# Patient Record
Sex: FEMALE | Race: OTHER | HISPANIC OR LATINO
[De-identification: names, ages, dates, MRNs, and addresses within clinical notes are randomized per-mention and may not be internally consistent; named-entity substitution may affect disease eponyms.]

---

## 2017-01-13 ENCOUNTER — RX RENEWAL (OUTPATIENT)
Age: 77
End: 2017-01-13

## 2017-02-02 ENCOUNTER — APPOINTMENT (OUTPATIENT)
Dept: INTERNAL MEDICINE | Facility: CLINIC | Age: 77
End: 2017-02-02

## 2017-02-02 VITALS
HEART RATE: 77 BPM | SYSTOLIC BLOOD PRESSURE: 132 MMHG | HEIGHT: 59 IN | BODY MASS INDEX: 28.83 KG/M2 | WEIGHT: 143 LBS | DIASTOLIC BLOOD PRESSURE: 72 MMHG

## 2017-02-07 ENCOUNTER — FORM ENCOUNTER (OUTPATIENT)
Age: 77
End: 2017-02-07

## 2017-02-08 ENCOUNTER — RESULT REVIEW (OUTPATIENT)
Age: 77
End: 2017-02-08

## 2017-02-08 ENCOUNTER — RX RENEWAL (OUTPATIENT)
Age: 77
End: 2017-02-08

## 2017-02-10 LAB
ALBUMIN SERPL-MCNC: 3.8 G/DL
ALBUMIN/GLOB SERPL: 1.19
ALP SERPL-CCNC: 56 IU/L
ALT SERPL-CCNC: 14 IU/L
ANION GAP SERPL CALC-SCNC: 10 MEQ/L
APPEARANCE UR: CLEAR
AST SERPL-CCNC: 15 IU/L
BACTERIA URNS QL MICRO: ABNORMAL
BASOPHILS # BLD: 0.07 TH/MM3
BASOPHILS NFR BLD: 0.7 %
BILIRUB SERPL-MCNC: 0.4 MG/DL
BILIRUB UR QL STRIP: NEGATIVE
BUN SERPL-MCNC: 14 MG/DL
BUN/CREAT SERPL: 19.7 %
CALCIUM SERPL-MCNC: 9.7 MG/DL
CHLORIDE SERPL-SCNC: 107 MEQ/L
CHOLEST SERPL-MCNC: 221 MG/DL
CO2 SERPL-SCNC: 25 MEQ/L
COLOR UR: YELLOW
CREAT SERPL-MCNC: 0.71 MG/DL
CREAT UR-MCNC: 137 MG/DL
EOSINOPHIL # BLD: 0.41 TH/MM3
EOSINOPHIL NFR BLD: 4.2 %
ERYTHROCYTE [DISTWIDTH] IN BLOOD BY AUTOMATED COUNT: 14.8 %
ESTIMATED AVERGAGE GLUCOSE (NORTH): 229 MG/DL
GFR SERPL CREATININE-BSD FRML MDRD: 80
GLUCOSE SERPL-MCNC: 166 MG/DL
GLUCOSE UR STRIP-MCNC: NEGATIVE MG/DL
GRANULOCYTES # BLD: 6.19 TH/MM3
GRANULOCYTES NFR BLD: 63.3 %
HBA1C MFR BLD: 9.6 %
HCT VFR BLD AUTO: 39 %
HDLC SERPL-MCNC: 69 MG/DL
HDLC SERPL: 3.2
HGB BLD-MCNC: 12.1 G/DL
HGB UR QL STRIP: NEGATIVE
IMM GRANULOCYTES # BLD: 0.03 TH/MM3
IMM GRANULOCYTES NFR BLD: 0.3 %
KETONES UR STRIP-MCNC: NEGATIVE MG/DL
LDLC SERPL DIRECT ASSAY-MCNC: 109 MG/DL
LYMPHOCYTES # BLD: 2.5 TH/MM3
LYMPHOCYTES NFR BLD: 25.5 %
MCH RBC QN AUTO: 27.8 PG
MCHC RBC AUTO-ENTMCNC: 31 G/DL
MCV RBC AUTO: 89.4 FL
MICROALBUMIN UR-MCNC: 1.1 MG/DL
MICROALBUMIN/CREAT UR-RTO: 8 UG/MG
MONOCYTES # BLD: 0.59 TH/MM3
MONOCYTES NFR BLD: 6 %
NITRITE UR QL STRIP: NEGATIVE
PH UR STRIP: 5.5
PLATELET # BLD: 469 TH/MM3
PMV BLD AUTO: 10.4 FL
POTASSIUM SERPL-SCNC: 5.2 MMOL/L
PROT SERPL-MCNC: 7 G/DL
PROT UR STRIP-MCNC: ABNORMAL MG/DL
RBC # BLD AUTO: 4.36 MIL/MM3
SODIUM SERPL-SCNC: 142 MEQ/L
SP GR UR STRIP: 1.02
TRIGL SERPL-MCNC: 296 MG/DL
URINE COMP/EPITH (NORTH): ABNORMAL
UROBILINOGEN UR STRIP-MCNC: 0.2 MG/DL
VLDLC SERPL-MCNC: 59 MG/DL
WBC # BLD: 9.79 TH/MM3
WBC URNS QL MICRO: ABNORMAL P/HPF
WBC URNS QL MICRO: NEGATIVE

## 2017-05-04 ENCOUNTER — APPOINTMENT (OUTPATIENT)
Dept: INTERNAL MEDICINE | Facility: CLINIC | Age: 77
End: 2017-05-04

## 2017-05-04 VITALS
HEART RATE: 81 BPM | SYSTOLIC BLOOD PRESSURE: 127 MMHG | DIASTOLIC BLOOD PRESSURE: 78 MMHG | BODY MASS INDEX: 28.67 KG/M2 | HEIGHT: 59 IN | WEIGHT: 142.19 LBS

## 2017-05-08 ENCOUNTER — FORM ENCOUNTER (OUTPATIENT)
Age: 77
End: 2017-05-08

## 2017-05-10 ENCOUNTER — APPOINTMENT (OUTPATIENT)
Dept: ORTHOPEDIC SURGERY | Facility: CLINIC | Age: 77
End: 2017-05-10

## 2017-05-19 ENCOUNTER — EMERGENCY (EMERGENCY)
Facility: HOSPITAL | Age: 77
LOS: 0 days | Discharge: HOME | End: 2017-05-19
Admitting: INTERNAL MEDICINE

## 2017-06-15 ENCOUNTER — APPOINTMENT (OUTPATIENT)
Dept: PODIATRY | Facility: CLINIC | Age: 77
End: 2017-06-15

## 2017-06-15 ENCOUNTER — OUTPATIENT (OUTPATIENT)
Dept: OUTPATIENT SERVICES | Facility: HOSPITAL | Age: 77
LOS: 1 days | Discharge: HOME | End: 2017-06-15

## 2017-06-15 DIAGNOSIS — L85.3 XEROSIS CUTIS: ICD-10-CM

## 2017-06-15 DIAGNOSIS — M79.671 PAIN IN RIGHT FOOT: ICD-10-CM

## 2017-06-15 DIAGNOSIS — E11.9 TYPE 2 DIABETES MELLITUS WITHOUT COMPLICATIONS: ICD-10-CM

## 2017-06-15 DIAGNOSIS — I10 ESSENTIAL (PRIMARY) HYPERTENSION: ICD-10-CM

## 2017-06-15 DIAGNOSIS — E78.5 HYPERLIPIDEMIA, UNSPECIFIED: ICD-10-CM

## 2017-06-15 DIAGNOSIS — M79.672 PAIN IN LEFT FOOT: ICD-10-CM

## 2017-06-15 DIAGNOSIS — B35.1 TINEA UNGUIUM: ICD-10-CM

## 2017-06-21 ENCOUNTER — APPOINTMENT (OUTPATIENT)
Dept: ENDOCRINOLOGY | Facility: CLINIC | Age: 77
End: 2017-06-21

## 2017-06-21 VITALS
DIASTOLIC BLOOD PRESSURE: 82 MMHG | HEIGHT: 59 IN | BODY MASS INDEX: 29.23 KG/M2 | SYSTOLIC BLOOD PRESSURE: 128 MMHG | WEIGHT: 145 LBS | HEART RATE: 69 BPM

## 2017-06-22 ENCOUNTER — APPOINTMENT (OUTPATIENT)
Dept: INTERNAL MEDICINE | Facility: CLINIC | Age: 77
End: 2017-06-22

## 2017-06-22 ENCOUNTER — OUTPATIENT (OUTPATIENT)
Dept: OUTPATIENT SERVICES | Facility: HOSPITAL | Age: 77
LOS: 1 days | Discharge: HOME | End: 2017-06-22

## 2017-06-22 VITALS
HEIGHT: 59 IN | DIASTOLIC BLOOD PRESSURE: 80 MMHG | SYSTOLIC BLOOD PRESSURE: 150 MMHG | WEIGHT: 146 LBS | HEART RATE: 86 BPM | BODY MASS INDEX: 29.43 KG/M2

## 2017-06-22 DIAGNOSIS — E78.5 HYPERLIPIDEMIA, UNSPECIFIED: ICD-10-CM

## 2017-06-22 DIAGNOSIS — Z85.038 PERSONAL HISTORY OF OTHER MALIGNANT NEOPLASM OF LARGE INTESTINE: ICD-10-CM

## 2017-06-22 DIAGNOSIS — I10 ESSENTIAL (PRIMARY) HYPERTENSION: ICD-10-CM

## 2017-06-22 DIAGNOSIS — E11.9 TYPE 2 DIABETES MELLITUS WITHOUT COMPLICATIONS: ICD-10-CM

## 2017-06-28 DIAGNOSIS — R10.32 LEFT LOWER QUADRANT PAIN: ICD-10-CM
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
## 2017-06-28 DIAGNOSIS — E11.9 TYPE 2 DIABETES MELLITUS WITHOUT COMPLICATIONS: ICD-10-CM

## 2017-06-28 DIAGNOSIS — R10.9 UNSPECIFIED ABDOMINAL PAIN: ICD-10-CM

## 2017-06-28 DIAGNOSIS — I10 ESSENTIAL (PRIMARY) HYPERTENSION: ICD-10-CM

## 2017-06-28 DIAGNOSIS — S61.201A UNSPECIFIED OPEN WOUND OF LEFT INDEX FINGER WITHOUT DAMAGE TO NAIL, INITIAL ENCOUNTER: ICD-10-CM
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
## 2017-06-28 DIAGNOSIS — Z79.84 LONG TERM (CURRENT) USE OF ORAL HYPOGLYCEMIC DRUGS: ICD-10-CM

## 2017-06-28 DIAGNOSIS — Z90.49 ACQUIRED ABSENCE OF OTHER SPECIFIED PARTS OF DIGESTIVE TRACT: ICD-10-CM

## 2017-06-28 DIAGNOSIS — Z79.899 OTHER LONG TERM (CURRENT) DRUG THERAPY: ICD-10-CM
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
## 2017-08-01 ENCOUNTER — APPOINTMENT (OUTPATIENT)
Dept: PULMONOLOGY | Facility: CLINIC | Age: 77
End: 2017-08-01

## 2017-08-01 ENCOUNTER — OUTPATIENT (OUTPATIENT)
Dept: OUTPATIENT SERVICES | Facility: HOSPITAL | Age: 77
LOS: 1 days | Discharge: HOME | End: 2017-08-01

## 2017-08-01 VITALS
HEIGHT: 59 IN | WEIGHT: 146 LBS | OXYGEN SATURATION: 99 % | HEART RATE: 71 BPM | BODY MASS INDEX: 29.43 KG/M2 | DIASTOLIC BLOOD PRESSURE: 66 MMHG | SYSTOLIC BLOOD PRESSURE: 104 MMHG

## 2017-08-01 DIAGNOSIS — E11.9 TYPE 2 DIABETES MELLITUS WITHOUT COMPLICATIONS: ICD-10-CM

## 2017-08-01 DIAGNOSIS — I10 ESSENTIAL (PRIMARY) HYPERTENSION: ICD-10-CM

## 2017-08-01 DIAGNOSIS — E78.5 HYPERLIPIDEMIA, UNSPECIFIED: ICD-10-CM

## 2017-08-02 ENCOUNTER — CLINICAL ADVICE (OUTPATIENT)
Age: 77
End: 2017-08-02

## 2017-08-02 DIAGNOSIS — Z82.49 FAMILY HISTORY OF ISCHEMIC HEART DISEASE AND OTHER DISEASES OF THE CIRCULATORY SYSTEM: ICD-10-CM

## 2017-08-02 DIAGNOSIS — Z83.3 FAMILY HISTORY OF DIABETES MELLITUS: ICD-10-CM

## 2017-08-02 DIAGNOSIS — Z63.4 DISAPPEARANCE AND DEATH OF FAMILY MEMBER: ICD-10-CM

## 2017-08-02 DIAGNOSIS — Z81.8 FAMILY HISTORY OF OTHER MENTAL AND BEHAVIORAL DISORDERS: ICD-10-CM

## 2017-08-02 DIAGNOSIS — Z80.1 FAMILY HISTORY OF MALIGNANT NEOPLASM OF TRACHEA, BRONCHUS AND LUNG: ICD-10-CM

## 2017-08-02 DIAGNOSIS — Z56.0 UNEMPLOYMENT, UNSPECIFIED: ICD-10-CM

## 2017-08-02 SDOH — SOCIAL STABILITY - SOCIAL INSECURITY: DISSAPEARANCE AND DEATH OF FAMILY MEMBER: Z63.4

## 2017-08-02 SDOH — ECONOMIC STABILITY - INCOME SECURITY: UNEMPLOYMENT, UNSPECIFIED: Z56.0

## 2017-08-03 ENCOUNTER — CLINICAL ADVICE (OUTPATIENT)
Age: 77
End: 2017-08-03

## 2017-08-03 PROBLEM — Z82.49 FAMILY HISTORY OF HYPERTENSION: Status: ACTIVE | Noted: 2017-08-03

## 2017-08-03 PROBLEM — Z81.8 FAMILY HISTORY OF DEPRESSION: Status: ACTIVE | Noted: 2017-08-03

## 2017-08-03 PROBLEM — Z80.1 FAMILY HISTORY OF LUNG CANCER: Status: ACTIVE | Noted: 2017-08-03

## 2017-08-04 PROBLEM — Z56.0 UNEMPLOYED: Status: ACTIVE | Noted: 2017-08-04

## 2017-08-10 ENCOUNTER — APPOINTMENT (OUTPATIENT)
Dept: INTERNAL MEDICINE | Facility: CLINIC | Age: 77
End: 2017-08-10

## 2017-08-11 ENCOUNTER — OUTPATIENT (OUTPATIENT)
Dept: OUTPATIENT SERVICES | Facility: HOSPITAL | Age: 77
LOS: 1 days | Discharge: HOME | End: 2017-08-11

## 2017-08-11 ENCOUNTER — APPOINTMENT (OUTPATIENT)
Dept: GASTROENTEROLOGY | Facility: CLINIC | Age: 77
End: 2017-08-11

## 2017-08-11 DIAGNOSIS — I10 ESSENTIAL (PRIMARY) HYPERTENSION: ICD-10-CM

## 2017-08-11 DIAGNOSIS — E78.5 HYPERLIPIDEMIA, UNSPECIFIED: ICD-10-CM

## 2017-08-11 DIAGNOSIS — R19.7 DIARRHEA, UNSPECIFIED: ICD-10-CM

## 2017-08-11 DIAGNOSIS — E11.9 TYPE 2 DIABETES MELLITUS WITHOUT COMPLICATIONS: ICD-10-CM

## 2017-08-11 RX ORDER — SITAGLIPTIN AND METFORMIN HYDROCHLORIDE 50; 1000 MG/1; MG/1
50-1000 TABLET, FILM COATED ORAL
Qty: 180 | Refills: 2 | Status: COMPLETED | COMMUNITY
Start: 2017-06-22 | End: 2017-08-02

## 2017-09-12 ENCOUNTER — OUTPATIENT (OUTPATIENT)
Dept: OUTPATIENT SERVICES | Facility: HOSPITAL | Age: 77
LOS: 1 days | Discharge: HOME | End: 2017-09-12

## 2017-09-12 ENCOUNTER — OTHER (OUTPATIENT)
Age: 77
End: 2017-09-12

## 2017-09-12 DIAGNOSIS — E11.9 TYPE 2 DIABETES MELLITUS WITHOUT COMPLICATIONS: ICD-10-CM

## 2017-09-12 DIAGNOSIS — E78.5 HYPERLIPIDEMIA, UNSPECIFIED: ICD-10-CM

## 2017-09-12 DIAGNOSIS — I10 ESSENTIAL (PRIMARY) HYPERTENSION: ICD-10-CM

## 2017-09-15 DIAGNOSIS — H40.029 OPEN ANGLE WITH BORDERLINE FINDINGS, HIGH RISK, UNSPECIFIED EYE: ICD-10-CM

## 2017-09-15 DIAGNOSIS — H35.09 OTHER INTRARETINAL MICROVASCULAR ABNORMALITIES: ICD-10-CM

## 2017-09-15 DIAGNOSIS — E11.9 TYPE 2 DIABETES MELLITUS WITHOUT COMPLICATIONS: ICD-10-CM

## 2017-09-15 DIAGNOSIS — H25.041 POSTERIOR SUBCAPSULAR POLAR AGE-RELATED CATARACT, RIGHT EYE: ICD-10-CM

## 2017-09-15 DIAGNOSIS — H25.812 COMBINED FORMS OF AGE-RELATED CATARACT, LEFT EYE: ICD-10-CM

## 2017-09-21 ENCOUNTER — APPOINTMENT (OUTPATIENT)
Dept: INTERNAL MEDICINE | Facility: CLINIC | Age: 77
End: 2017-09-21

## 2017-09-22 ENCOUNTER — OTHER (OUTPATIENT)
Age: 77
End: 2017-09-22

## 2017-10-12 ENCOUNTER — MESSAGE (OUTPATIENT)
Age: 77
End: 2017-10-12

## 2017-10-18 ENCOUNTER — OUTPATIENT (OUTPATIENT)
Dept: OUTPATIENT SERVICES | Facility: HOSPITAL | Age: 77
LOS: 1 days | Discharge: HOME | End: 2017-10-18

## 2017-10-18 DIAGNOSIS — E11.9 TYPE 2 DIABETES MELLITUS WITHOUT COMPLICATIONS: ICD-10-CM

## 2017-10-18 DIAGNOSIS — E78.5 HYPERLIPIDEMIA, UNSPECIFIED: ICD-10-CM

## 2017-10-18 DIAGNOSIS — I10 ESSENTIAL (PRIMARY) HYPERTENSION: ICD-10-CM

## 2017-10-20 DIAGNOSIS — E11.9 TYPE 2 DIABETES MELLITUS WITHOUT COMPLICATIONS: ICD-10-CM

## 2017-10-20 DIAGNOSIS — I10 ESSENTIAL (PRIMARY) HYPERTENSION: ICD-10-CM

## 2017-10-20 DIAGNOSIS — Z86.010 PERSONAL HISTORY OF COLONIC POLYPS: ICD-10-CM

## 2017-10-20 DIAGNOSIS — Z85.038 PERSONAL HISTORY OF OTHER MALIGNANT NEOPLASM OF LARGE INTESTINE: ICD-10-CM

## 2017-10-20 DIAGNOSIS — Z12.11 ENCOUNTER FOR SCREENING FOR MALIGNANT NEOPLASM OF COLON: ICD-10-CM

## 2017-10-20 DIAGNOSIS — K64.8 OTHER HEMORRHOIDS: ICD-10-CM

## 2017-11-20 ENCOUNTER — OTHER (OUTPATIENT)
Age: 77
End: 2017-11-20

## 2017-12-05 ENCOUNTER — EMERGENCY (EMERGENCY)
Facility: HOSPITAL | Age: 77
LOS: 0 days | Discharge: HOME | End: 2017-12-05
Admitting: INTERNAL MEDICINE

## 2017-12-05 DIAGNOSIS — J45.909 UNSPECIFIED ASTHMA, UNCOMPLICATED: ICD-10-CM

## 2017-12-05 DIAGNOSIS — I10 ESSENTIAL (PRIMARY) HYPERTENSION: ICD-10-CM

## 2017-12-05 DIAGNOSIS — E11.9 TYPE 2 DIABETES MELLITUS WITHOUT COMPLICATIONS: ICD-10-CM

## 2017-12-05 DIAGNOSIS — Z79.84 LONG TERM (CURRENT) USE OF ORAL HYPOGLYCEMIC DRUGS: ICD-10-CM

## 2017-12-05 DIAGNOSIS — E78.5 HYPERLIPIDEMIA, UNSPECIFIED: ICD-10-CM

## 2017-12-05 DIAGNOSIS — Z90.49 ACQUIRED ABSENCE OF OTHER SPECIFIED PARTS OF DIGESTIVE TRACT: ICD-10-CM

## 2017-12-05 DIAGNOSIS — R05 COUGH: ICD-10-CM

## 2017-12-05 DIAGNOSIS — Z79.899 OTHER LONG TERM (CURRENT) DRUG THERAPY: ICD-10-CM

## 2018-02-15 ENCOUNTER — APPOINTMENT (OUTPATIENT)
Dept: INTERNAL MEDICINE | Facility: CLINIC | Age: 78
End: 2018-02-15

## 2018-02-15 ENCOUNTER — OUTPATIENT (OUTPATIENT)
Dept: OUTPATIENT SERVICES | Facility: HOSPITAL | Age: 78
LOS: 1 days | Discharge: HOME | End: 2018-02-15

## 2018-02-15 VITALS
SYSTOLIC BLOOD PRESSURE: 147 MMHG | DIASTOLIC BLOOD PRESSURE: 66 MMHG | WEIGHT: 146 LBS | BODY MASS INDEX: 29.43 KG/M2 | HEART RATE: 82 BPM | HEIGHT: 59 IN

## 2018-02-15 DIAGNOSIS — R10.9 UNSPECIFIED ABDOMINAL PAIN: ICD-10-CM

## 2018-02-15 DIAGNOSIS — J34.1 CYST AND MUCOCELE OF NOSE AND NASAL SINUS: ICD-10-CM

## 2018-02-15 DIAGNOSIS — E78.5 HYPERLIPIDEMIA, UNSPECIFIED: ICD-10-CM

## 2018-02-15 DIAGNOSIS — E11.9 TYPE 2 DIABETES MELLITUS WITHOUT COMPLICATIONS: ICD-10-CM

## 2018-02-15 DIAGNOSIS — I10 ESSENTIAL (PRIMARY) HYPERTENSION: ICD-10-CM

## 2018-02-15 RX ORDER — PIOGLITAZONE HYDROCHLORIDE 30 MG/1
30 TABLET ORAL DAILY
Qty: 90 | Refills: 3 | Status: DISCONTINUED | COMMUNITY
Start: 2017-06-22 | End: 2018-02-15

## 2018-02-20 ENCOUNTER — OUTPATIENT (OUTPATIENT)
Dept: OUTPATIENT SERVICES | Facility: HOSPITAL | Age: 78
LOS: 1 days | Discharge: HOME | End: 2018-02-20

## 2018-02-20 ENCOUNTER — APPOINTMENT (OUTPATIENT)
Dept: PULMONOLOGY | Facility: CLINIC | Age: 78
End: 2018-02-20

## 2018-02-20 VITALS
HEIGHT: 59 IN | OXYGEN SATURATION: 97 % | BODY MASS INDEX: 29.43 KG/M2 | SYSTOLIC BLOOD PRESSURE: 120 MMHG | HEART RATE: 75 BPM | WEIGHT: 146 LBS | DIASTOLIC BLOOD PRESSURE: 70 MMHG

## 2018-03-08 ENCOUNTER — APPOINTMENT (OUTPATIENT)
Dept: PODIATRY | Facility: CLINIC | Age: 78
End: 2018-03-08

## 2018-03-14 ENCOUNTER — OUTPATIENT (OUTPATIENT)
Dept: OUTPATIENT SERVICES | Facility: HOSPITAL | Age: 78
LOS: 1 days | Discharge: HOME | End: 2018-03-14

## 2018-03-20 ENCOUNTER — APPOINTMENT (OUTPATIENT)
Dept: PULMONOLOGY | Facility: CLINIC | Age: 78
End: 2018-03-20

## 2018-03-20 ENCOUNTER — OUTPATIENT (OUTPATIENT)
Dept: OUTPATIENT SERVICES | Facility: HOSPITAL | Age: 78
LOS: 1 days | Discharge: HOME | End: 2018-03-20

## 2018-03-20 VITALS
HEIGHT: 59 IN | DIASTOLIC BLOOD PRESSURE: 70 MMHG | HEART RATE: 74 BPM | SYSTOLIC BLOOD PRESSURE: 120 MMHG | OXYGEN SATURATION: 97 % | WEIGHT: 142.56 LBS | BODY MASS INDEX: 28.74 KG/M2

## 2018-03-20 DIAGNOSIS — R05 COUGH: ICD-10-CM

## 2018-03-27 ENCOUNTER — APPOINTMENT (OUTPATIENT)
Dept: PULMONOLOGY | Facility: CLINIC | Age: 78
End: 2018-03-27

## 2018-03-30 ENCOUNTER — RX RENEWAL (OUTPATIENT)
Age: 78
End: 2018-03-30

## 2018-04-11 ENCOUNTER — OUTPATIENT (OUTPATIENT)
Dept: OUTPATIENT SERVICES | Facility: HOSPITAL | Age: 78
LOS: 1 days | Discharge: HOME | End: 2018-04-11

## 2018-04-11 ENCOUNTER — APPOINTMENT (OUTPATIENT)
Dept: ENDOCRINOLOGY | Facility: CLINIC | Age: 78
End: 2018-04-11

## 2018-04-11 VITALS
WEIGHT: 142 LBS | HEIGHT: 59 IN | SYSTOLIC BLOOD PRESSURE: 122 MMHG | HEART RATE: 75 BPM | DIASTOLIC BLOOD PRESSURE: 76 MMHG | BODY MASS INDEX: 28.63 KG/M2 | RESPIRATION RATE: 18 BRPM

## 2018-04-11 LAB — GLUCOSE BLDC GLUCOMTR-MCNC: 203

## 2018-05-01 ENCOUNTER — LABORATORY RESULT (OUTPATIENT)
Age: 78
End: 2018-05-01

## 2018-05-01 ENCOUNTER — APPOINTMENT (OUTPATIENT)
Dept: INTERNAL MEDICINE | Facility: CLINIC | Age: 78
End: 2018-05-01

## 2018-05-01 ENCOUNTER — OUTPATIENT (OUTPATIENT)
Dept: OUTPATIENT SERVICES | Facility: HOSPITAL | Age: 78
LOS: 1 days | Discharge: HOME | End: 2018-05-01

## 2018-05-01 VITALS
SYSTOLIC BLOOD PRESSURE: 146 MMHG | HEART RATE: 76 BPM | BODY MASS INDEX: 28.22 KG/M2 | DIASTOLIC BLOOD PRESSURE: 84 MMHG | HEIGHT: 59 IN | WEIGHT: 140 LBS

## 2018-05-01 RX ORDER — GUAIFENESIN 600 MG/1
600 TABLET, EXTENDED RELEASE ORAL
Qty: 14 | Refills: 0 | Status: COMPLETED | COMMUNITY
Start: 2018-02-15 | End: 2018-05-01

## 2018-05-01 RX ORDER — PREDNISONE 50 MG/1
50 TABLET ORAL
Qty: 5 | Refills: 0 | Status: COMPLETED | COMMUNITY
Start: 2017-12-05

## 2018-05-01 RX ORDER — POLYETHYLENE GLYCOL 3350 AND ELECTROLYTES WITH LEMON FLAVOR 236; 22.74; 6.74; 5.86; 2.97 G/4L; G/4L; G/4L; G/4L; G/4L
236 POWDER, FOR SOLUTION ORAL
Qty: 1 | Refills: 0 | Status: COMPLETED | COMMUNITY
Start: 2017-08-11 | End: 2018-05-01

## 2018-05-02 LAB — TSH SERPL-ACNC: 1.8 UIU/ML

## 2018-05-10 ENCOUNTER — FORM ENCOUNTER (OUTPATIENT)
Age: 78
End: 2018-05-10

## 2018-05-11 ENCOUNTER — OUTPATIENT (OUTPATIENT)
Dept: OUTPATIENT SERVICES | Facility: HOSPITAL | Age: 78
LOS: 1 days | Discharge: HOME | End: 2018-05-11

## 2018-05-11 DIAGNOSIS — J45.909 UNSPECIFIED ASTHMA, UNCOMPLICATED: ICD-10-CM

## 2018-05-11 LAB
25(OH)D3 SERPL-MCNC: 12.4 NG/ML
ALBUMIN SERPL ELPH-MCNC: 4.3 G/DL
ALP BLD-CCNC: 52 U/L
ALT SERPL-CCNC: 13 U/L
ANION GAP SERPL CALC-SCNC: 18 MMOL/L
APPEARANCE: CLEAR
AST SERPL-CCNC: 15 U/L
BASOPHILS # BLD AUTO: 0.05 K/UL
BASOPHILS NFR BLD AUTO: 0.4 %
BILIRUB SERPL-MCNC: <0.2 MG/DL
BILIRUBIN URINE: NEGATIVE
BLOOD URINE: NEGATIVE
BUN SERPL-MCNC: 23 MG/DL
CALCIUM SERPL-MCNC: 10.1 MG/DL
CHLORIDE SERPL-SCNC: 98 MMOL/L
CHOLEST SERPL-MCNC: 213 MG/DL
CHOLEST/HDLC SERPL: 2.4 RATIO
CO2 SERPL-SCNC: 26 MMOL/L
COLOR: YELLOW
CREAT SERPL-MCNC: 0.8 MG/DL
CREAT SPEC-SCNC: 119 MG/DL
EOSINOPHIL # BLD AUTO: 0.19 K/UL
EOSINOPHIL NFR BLD AUTO: 1.4 %
GLUCOSE QUALITATIVE U: 1000 MG/DL
GLUCOSE SERPL-MCNC: 213 MG/DL
HBA1C MFR BLD HPLC: 11.8 %
HCT VFR BLD CALC: 39.4 %
HDLC SERPL-MCNC: 89 MG/DL
HGB BLD-MCNC: 12.1 G/DL
IMM GRANULOCYTES NFR BLD AUTO: 1.1 %
KETONES URINE: NEGATIVE
LDLC SERPL CALC-MCNC: 96 MG/DL
LEUKOCYTE ESTERASE URINE: NEGATIVE
LYMPHOCYTES # BLD AUTO: 3.11 K/UL
LYMPHOCYTES NFR BLD AUTO: 23.2 %
MAN DIFF?: NORMAL
MCHC RBC-ENTMCNC: 28.3 PG
MCHC RBC-ENTMCNC: 30.7 GM/DL
MCV RBC AUTO: 92.3 FL
MICROALBUMIN 24H UR DL<=1MG/L-MCNC: 2.1 MG/DL
MICROALBUMIN/CREAT 24H UR-RTO: 18 MG/G
MONOCYTES # BLD AUTO: 0.86 K/UL
MONOCYTES NFR BLD AUTO: 6.4 %
NEUTROPHILS # BLD AUTO: 9.04 K/UL
NEUTROPHILS NFR BLD AUTO: 67.5 %
NITRITE URINE: NEGATIVE
PH URINE: 5.5
PLATELET # BLD AUTO: 451 K/UL
POTASSIUM SERPL-SCNC: 5.5 MMOL/L
PROT SERPL-MCNC: 7.3 G/DL
PROTEIN URINE: ABNORMAL MG/DL
RBC # BLD: 4.27 M/UL
RBC # FLD: 14.5 %
SODIUM SERPL-SCNC: 142 MMOL/L
SPECIFIC GRAVITY URINE: 1.02
TRIGL SERPL-MCNC: 287 MG/DL
UROBILINOGEN URINE: NEGATIVE MG/DL
WBC # FLD AUTO: 13.4 K/UL

## 2018-06-05 ENCOUNTER — APPOINTMENT (OUTPATIENT)
Dept: PULMONOLOGY | Facility: CLINIC | Age: 78
End: 2018-06-05

## 2018-06-05 ENCOUNTER — OUTPATIENT (OUTPATIENT)
Dept: OUTPATIENT SERVICES | Facility: HOSPITAL | Age: 78
LOS: 1 days | Discharge: HOME | End: 2018-06-05

## 2018-06-05 VITALS
DIASTOLIC BLOOD PRESSURE: 72 MMHG | WEIGHT: 144 LBS | HEIGHT: 59 IN | RESPIRATION RATE: 18 BRPM | SYSTOLIC BLOOD PRESSURE: 118 MMHG | OXYGEN SATURATION: 99 % | BODY MASS INDEX: 29.03 KG/M2 | HEART RATE: 70 BPM

## 2018-07-11 ENCOUNTER — OUTPATIENT (OUTPATIENT)
Dept: OUTPATIENT SERVICES | Facility: HOSPITAL | Age: 78
LOS: 1 days | Discharge: HOME | End: 2018-07-11

## 2018-07-12 DIAGNOSIS — H35.09 OTHER INTRARETINAL MICROVASCULAR ABNORMALITIES: ICD-10-CM

## 2018-07-12 DIAGNOSIS — H25.812 COMBINED FORMS OF AGE-RELATED CATARACT, LEFT EYE: ICD-10-CM

## 2018-07-12 DIAGNOSIS — E11.9 TYPE 2 DIABETES MELLITUS WITHOUT COMPLICATIONS: ICD-10-CM

## 2018-07-12 DIAGNOSIS — H40.029 OPEN ANGLE WITH BORDERLINE FINDINGS, HIGH RISK, UNSPECIFIED EYE: ICD-10-CM

## 2018-07-27 ENCOUNTER — CHART COPY (OUTPATIENT)
Age: 78
End: 2018-07-27

## 2018-09-11 ENCOUNTER — RX RENEWAL (OUTPATIENT)
Age: 78
End: 2018-09-11

## 2018-10-09 ENCOUNTER — RX RENEWAL (OUTPATIENT)
Age: 78
End: 2018-10-09

## 2018-12-10 ENCOUNTER — RX RENEWAL (OUTPATIENT)
Age: 78
End: 2018-12-10

## 2019-01-28 ENCOUNTER — RX RENEWAL (OUTPATIENT)
Age: 79
End: 2019-01-28

## 2019-03-15 ENCOUNTER — OUTPATIENT (OUTPATIENT)
Dept: OUTPATIENT SERVICES | Facility: HOSPITAL | Age: 79
LOS: 1 days | Discharge: HOME | End: 2019-03-15

## 2019-03-15 ENCOUNTER — APPOINTMENT (OUTPATIENT)
Dept: INTERNAL MEDICINE | Facility: CLINIC | Age: 79
End: 2019-03-15

## 2019-03-15 VITALS
TEMPERATURE: 97.7 F | DIASTOLIC BLOOD PRESSURE: 83 MMHG | BODY MASS INDEX: 28.22 KG/M2 | HEIGHT: 59 IN | SYSTOLIC BLOOD PRESSURE: 141 MMHG | WEIGHT: 140 LBS | HEART RATE: 73 BPM

## 2019-03-15 NOTE — PHYSICAL EXAM

## 2019-03-15 NOTE — HISTORY OF PRESENT ILLNESS
[de-identified] : 77 Year old female with PMH of DM, HTN, and Asthma presents follow up for a chronic productive cough. She says her cough is chronic, intermittent and has whitish sputum production. She denies any blood in the sputum. She denies any fever or chills. She has shortness of breath on exertion, has complaints of paroxysmal nocturnal dyspnea and orthopnea. She uses 2 pillows to sleep at night.

## 2019-03-15 NOTE — PLAN
[FreeTextEntry1] : 78 year old being evaluated for\par \par #D.M;\par last HbA1c was above 11.8, will repeat blood work\par not compliant with medicines\par will give endocrinology referral again\par not taking PIOGLITAZONE, will start taking it\par on metformin\par opthalmology referral given again\par podiatry referral given again\par \par HTN;\par controlled\par cw Toprol,HCTZ and Nifedipine\par will switch benzepril to losartan considering persistent chronic cough\par \par \par #DLD:\par cw Simvastatin \par re check lipid profile\par \par #Asthma;\par will start on maintenance dose laba-steroid\par will give robutusin for cough\par continue albuterol prn\par \par #Depression;\par will incraese the dose of  Sertraline\par \par \par #HMC:\par pt sent to lab for work up\par colonoscopy repeat in 2 years\par follow up in 2 months or PRN

## 2019-03-18 DIAGNOSIS — F33.9 MAJOR DEPRESSIVE DISORDER, RECURRENT, UNSPECIFIED: ICD-10-CM

## 2019-03-18 DIAGNOSIS — E11.9 TYPE 2 DIABETES MELLITUS WITHOUT COMPLICATIONS: ICD-10-CM

## 2019-03-18 DIAGNOSIS — J45.998 OTHER ASTHMA: ICD-10-CM

## 2019-03-18 DIAGNOSIS — Z63.4 DISAPPEARANCE AND DEATH OF FAMILY MEMBER: ICD-10-CM

## 2019-03-18 LAB
ALBUMIN SERPL ELPH-MCNC: 4.5 G/DL
ALP BLD-CCNC: 69 U/L
ALT SERPL-CCNC: 10 U/L
ANION GAP SERPL CALC-SCNC: 16 MMOL/L
AST SERPL-CCNC: 11 U/L
BASOPHILS # BLD AUTO: 0.1 K/UL
BASOPHILS NFR BLD AUTO: 0.8 %
BILIRUB SERPL-MCNC: <0.2 MG/DL
BUN SERPL-MCNC: 24 MG/DL
CALCIUM SERPL-MCNC: 9.6 MG/DL
CHLORIDE SERPL-SCNC: 103 MMOL/L
CHOLEST SERPL-MCNC: 151 MG/DL
CHOLEST/HDLC SERPL: 2.2 RATIO
CO2 SERPL-SCNC: 20 MMOL/L
CREAT SERPL-MCNC: 1 MG/DL
EOSINOPHIL # BLD AUTO: 0.7 K/UL
EOSINOPHIL NFR BLD AUTO: 5.4 %
ESTIMATED AVERAGE GLUCOSE: 197 MG/DL
GLUCOSE SERPL-MCNC: 166 MG/DL
HBA1C MFR BLD HPLC: 8.5 %
HCT VFR BLD CALC: 37.5 %
HDLC SERPL-MCNC: 70 MG/DL
HGB BLD-MCNC: 11.5 G/DL
IMM GRANULOCYTES NFR BLD AUTO: 0.5 %
LDLC SERPL CALC-MCNC: 65 MG/DL
LYMPHOCYTES # BLD AUTO: 2.9 K/UL
LYMPHOCYTES NFR BLD AUTO: 22.5 %
MAGNESIUM SERPL-MCNC: 1.9 MG/DL
MAN DIFF?: NORMAL
MCHC RBC-ENTMCNC: 27.5 PG
MCHC RBC-ENTMCNC: 30.7 G/DL
MCV RBC AUTO: 89.7 FL
MONOCYTES # BLD AUTO: 0.77 K/UL
MONOCYTES NFR BLD AUTO: 6 %
NEUTROPHILS # BLD AUTO: 8.35 K/UL
NEUTROPHILS NFR BLD AUTO: 64.8 %
NT-PROBNP SERPL-MCNC: 373 PG/ML
PLATELET # BLD AUTO: 451 K/UL
POTASSIUM SERPL-SCNC: 5.2 MMOL/L
PROT SERPL-MCNC: 7.7 G/DL
RBC # BLD: 4.18 M/UL
RBC # FLD: 13.4 %
SODIUM SERPL-SCNC: 139 MMOL/L
TRIGL SERPL-MCNC: 209 MG/DL
WBC # FLD AUTO: 12.88 K/UL

## 2019-03-18 SDOH — SOCIAL STABILITY - SOCIAL INSECURITY: DISSAPEARANCE AND DEATH OF FAMILY MEMBER: Z63.4

## 2019-03-22 DIAGNOSIS — F33.1 MAJOR DEPRESSIVE DISORDER, RECURRENT, MODERATE: ICD-10-CM

## 2019-04-26 ENCOUNTER — INPATIENT (INPATIENT)
Facility: HOSPITAL | Age: 79
LOS: 5 days | Discharge: HOME | End: 2019-05-02
Attending: INTERNAL MEDICINE | Admitting: INTERNAL MEDICINE
Payer: MEDICARE

## 2019-04-26 VITALS
TEMPERATURE: 97 F | DIASTOLIC BLOOD PRESSURE: 79 MMHG | HEART RATE: 97 BPM | RESPIRATION RATE: 22 BRPM | SYSTOLIC BLOOD PRESSURE: 169 MMHG | OXYGEN SATURATION: 93 %

## 2019-04-26 DIAGNOSIS — Z96.651 PRESENCE OF RIGHT ARTIFICIAL KNEE JOINT: Chronic | ICD-10-CM

## 2019-04-26 DIAGNOSIS — Z98.891 HISTORY OF UTERINE SCAR FROM PREVIOUS SURGERY: Chronic | ICD-10-CM

## 2019-04-26 DIAGNOSIS — Z96.0 PRESENCE OF UROGENITAL IMPLANTS: Chronic | ICD-10-CM

## 2019-04-26 DIAGNOSIS — Z90.49 ACQUIRED ABSENCE OF OTHER SPECIFIED PARTS OF DIGESTIVE TRACT: Chronic | ICD-10-CM

## 2019-04-26 LAB
ALBUMIN SERPL ELPH-MCNC: 4.8 G/DL — SIGNIFICANT CHANGE UP (ref 3.5–5.2)
ALP SERPL-CCNC: 78 U/L — SIGNIFICANT CHANGE UP (ref 30–115)
ALT FLD-CCNC: 12 U/L — SIGNIFICANT CHANGE UP (ref 0–41)
ANION GAP SERPL CALC-SCNC: 17 MMOL/L — HIGH (ref 7–14)
AST SERPL-CCNC: 16 U/L — SIGNIFICANT CHANGE UP (ref 0–41)
BASE EXCESS BLDV CALC-SCNC: -5.4 MMOL/L — LOW (ref -2–2)
BILIRUB SERPL-MCNC: <0.2 MG/DL — SIGNIFICANT CHANGE UP (ref 0.2–1.2)
BUN SERPL-MCNC: 21 MG/DL — HIGH (ref 10–20)
CA-I SERPL-SCNC: 1.2 MMOL/L — SIGNIFICANT CHANGE UP (ref 1.12–1.3)
CALCIUM SERPL-MCNC: 9.4 MG/DL — SIGNIFICANT CHANGE UP (ref 8.5–10.1)
CHLORIDE SERPL-SCNC: 103 MMOL/L — SIGNIFICANT CHANGE UP (ref 98–110)
CO2 SERPL-SCNC: 19 MMOL/L — SIGNIFICANT CHANGE UP (ref 17–32)
CREAT SERPL-MCNC: 0.9 MG/DL — SIGNIFICANT CHANGE UP (ref 0.7–1.5)
GAS PNL BLDA: SIGNIFICANT CHANGE UP
GAS PNL BLDV: 144 MMOL/L — SIGNIFICANT CHANGE UP (ref 136–145)
GAS PNL BLDV: SIGNIFICANT CHANGE UP
GLUCOSE BLDC GLUCOMTR-MCNC: 292 MG/DL — HIGH (ref 70–99)
GLUCOSE SERPL-MCNC: 256 MG/DL — HIGH (ref 70–99)
HCO3 BLDV-SCNC: 21 MMOL/L — LOW (ref 22–29)
HCT VFR BLD CALC: 39 % — SIGNIFICANT CHANGE UP (ref 37–47)
HCT VFR BLDA CALC: 39.6 % — SIGNIFICANT CHANGE UP (ref 34–44)
HGB BLD CALC-MCNC: 12.9 G/DL — LOW (ref 14–18)
HGB BLD-MCNC: 12.4 G/DL — SIGNIFICANT CHANGE UP (ref 12–16)
LACTATE BLDV-MCNC: 2.8 MMOL/L — HIGH (ref 0.5–1.6)
LACTATE SERPL-SCNC: 3.4 MMOL/L — HIGH (ref 0.5–2.2)
MCHC RBC-ENTMCNC: 28.1 PG — SIGNIFICANT CHANGE UP (ref 27–31)
MCHC RBC-ENTMCNC: 31.8 G/DL — LOW (ref 32–37)
MCV RBC AUTO: 88.4 FL — SIGNIFICANT CHANGE UP (ref 81–99)
NRBC # BLD: 0 /100 WBCS — SIGNIFICANT CHANGE UP (ref 0–0)
NT-PROBNP SERPL-SCNC: 733 PG/ML — HIGH (ref 0–300)
PCO2 BLDV: 45 MMHG — SIGNIFICANT CHANGE UP (ref 41–51)
PH BLDV: 7.28 — SIGNIFICANT CHANGE UP (ref 7.26–7.43)
PLATELET # BLD AUTO: 491 K/UL — HIGH (ref 130–400)
PO2 BLDV: 35 MMHG — SIGNIFICANT CHANGE UP (ref 20–40)
POTASSIUM BLDV-SCNC: 4.1 MMOL/L — SIGNIFICANT CHANGE UP (ref 3.3–5.6)
POTASSIUM SERPL-MCNC: 4.5 MMOL/L — SIGNIFICANT CHANGE UP (ref 3.5–5)
POTASSIUM SERPL-SCNC: 4.5 MMOL/L — SIGNIFICANT CHANGE UP (ref 3.5–5)
PROT SERPL-MCNC: 8.2 G/DL — HIGH (ref 6–8)
RBC # BLD: 4.41 M/UL — SIGNIFICANT CHANGE UP (ref 4.2–5.4)
RBC # FLD: 13.2 % — SIGNIFICANT CHANGE UP (ref 11.5–14.5)
SAO2 % BLDV: 64 % — SIGNIFICANT CHANGE UP
SODIUM SERPL-SCNC: 139 MMOL/L — SIGNIFICANT CHANGE UP (ref 135–146)
TROPONIN T SERPL-MCNC: <0.01 NG/ML — SIGNIFICANT CHANGE UP
TROPONIN T SERPL-MCNC: <0.01 NG/ML — SIGNIFICANT CHANGE UP
WBC # BLD: 19.78 K/UL — HIGH (ref 4.8–10.8)
WBC # FLD AUTO: 19.78 K/UL — HIGH (ref 4.8–10.8)

## 2019-04-26 PROCEDURE — 99291 CRITICAL CARE FIRST HOUR: CPT

## 2019-04-26 PROCEDURE — 93010 ELECTROCARDIOGRAM REPORT: CPT | Mod: 76

## 2019-04-26 PROCEDURE — 71045 X-RAY EXAM CHEST 1 VIEW: CPT | Mod: 26

## 2019-04-26 RX ORDER — IPRATROPIUM/ALBUTEROL SULFATE 18-103MCG
3 AEROSOL WITH ADAPTER (GRAM) INHALATION EVERY 4 HOURS
Qty: 0 | Refills: 0 | Status: DISCONTINUED | OUTPATIENT
Start: 2019-04-26 | End: 2019-05-02

## 2019-04-26 RX ORDER — IPRATROPIUM/ALBUTEROL SULFATE 18-103MCG
3 AEROSOL WITH ADAPTER (GRAM) INHALATION
Qty: 0 | Refills: 0 | Status: COMPLETED | OUTPATIENT
Start: 2019-04-26 | End: 2019-04-26

## 2019-04-26 RX ORDER — LOSARTAN POTASSIUM 100 MG/1
0 TABLET, FILM COATED ORAL
Qty: 0 | Refills: 0 | COMMUNITY

## 2019-04-26 RX ORDER — METOPROLOL TARTRATE 50 MG
0 TABLET ORAL
Qty: 0 | Refills: 0 | COMMUNITY

## 2019-04-26 RX ORDER — LOSARTAN POTASSIUM 100 MG/1
25 TABLET, FILM COATED ORAL DAILY
Qty: 0 | Refills: 0 | Status: DISCONTINUED | OUTPATIENT
Start: 2019-04-26 | End: 2019-05-02

## 2019-04-26 RX ORDER — HYDROCHLOROTHIAZIDE 25 MG
25 TABLET ORAL DAILY
Qty: 0 | Refills: 0 | Status: DISCONTINUED | OUTPATIENT
Start: 2019-04-26 | End: 2019-05-02

## 2019-04-26 RX ORDER — METFORMIN HYDROCHLORIDE 850 MG/1
0 TABLET ORAL
Qty: 0 | Refills: 0 | COMMUNITY

## 2019-04-26 RX ORDER — NIFEDIPINE 30 MG
0 TABLET, EXTENDED RELEASE 24 HR ORAL
Qty: 0 | Refills: 0 | COMMUNITY

## 2019-04-26 RX ORDER — SODIUM CHLORIDE 9 MG/ML
1000 INJECTION, SOLUTION INTRAVENOUS ONCE
Qty: 0 | Refills: 0 | Status: COMPLETED | OUTPATIENT
Start: 2019-04-26 | End: 2019-04-26

## 2019-04-26 RX ORDER — HYDROCHLOROTHIAZIDE 25 MG
0 TABLET ORAL
Qty: 0 | Refills: 0 | COMMUNITY

## 2019-04-26 RX ORDER — NIFEDIPINE 30 MG
60 TABLET, EXTENDED RELEASE 24 HR ORAL DAILY
Qty: 0 | Refills: 0 | Status: DISCONTINUED | OUTPATIENT
Start: 2019-04-26 | End: 2019-05-02

## 2019-04-26 RX ORDER — PANTOPRAZOLE SODIUM 20 MG/1
40 TABLET, DELAYED RELEASE ORAL
Qty: 0 | Refills: 0 | Status: DISCONTINUED | OUTPATIENT
Start: 2019-04-26 | End: 2019-05-02

## 2019-04-26 RX ORDER — MAGNESIUM SULFATE 500 MG/ML
2 VIAL (ML) INJECTION ONCE
Qty: 0 | Refills: 0 | Status: COMPLETED | OUTPATIENT
Start: 2019-04-26 | End: 2019-04-26

## 2019-04-26 RX ORDER — SIMVASTATIN 20 MG/1
20 TABLET, FILM COATED ORAL AT BEDTIME
Qty: 0 | Refills: 0 | Status: DISCONTINUED | OUTPATIENT
Start: 2019-04-26 | End: 2019-05-02

## 2019-04-26 RX ORDER — SIMVASTATIN 20 MG/1
0 TABLET, FILM COATED ORAL
Qty: 0 | Refills: 0 | COMMUNITY

## 2019-04-26 RX ORDER — LABETALOL HCL 100 MG
10 TABLET ORAL ONCE
Qty: 0 | Refills: 0 | Status: COMPLETED | OUTPATIENT
Start: 2019-04-26 | End: 2019-04-26

## 2019-04-26 RX ORDER — CHLORHEXIDINE GLUCONATE 213 G/1000ML
1 SOLUTION TOPICAL
Qty: 0 | Refills: 0 | Status: DISCONTINUED | OUTPATIENT
Start: 2019-04-26 | End: 2019-05-02

## 2019-04-26 RX ORDER — TIOTROPIUM BROMIDE 18 UG/1
1 CAPSULE ORAL; RESPIRATORY (INHALATION) DAILY
Qty: 0 | Refills: 0 | Status: DISCONTINUED | OUTPATIENT
Start: 2019-04-26 | End: 2019-05-02

## 2019-04-26 RX ORDER — ALBUTEROL 90 UG/1
1 AEROSOL, METERED ORAL EVERY 4 HOURS
Qty: 0 | Refills: 0 | Status: DISCONTINUED | OUTPATIENT
Start: 2019-04-26 | End: 2019-05-02

## 2019-04-26 RX ORDER — SERTRALINE 25 MG/1
0 TABLET, FILM COATED ORAL
Qty: 0 | Refills: 0 | COMMUNITY

## 2019-04-26 RX ORDER — SERTRALINE 25 MG/1
50 TABLET, FILM COATED ORAL DAILY
Qty: 0 | Refills: 0 | Status: DISCONTINUED | OUTPATIENT
Start: 2019-04-26 | End: 2019-05-02

## 2019-04-26 RX ORDER — IPRATROPIUM/ALBUTEROL SULFATE 18-103MCG
3 AEROSOL WITH ADAPTER (GRAM) INHALATION EVERY 6 HOURS
Qty: 0 | Refills: 0 | Status: DISCONTINUED | OUTPATIENT
Start: 2019-04-26 | End: 2019-04-26

## 2019-04-26 RX ORDER — ENOXAPARIN SODIUM 100 MG/ML
40 INJECTION SUBCUTANEOUS DAILY
Qty: 0 | Refills: 0 | Status: DISCONTINUED | OUTPATIENT
Start: 2019-04-26 | End: 2019-05-02

## 2019-04-26 RX ADMIN — Medication 40 MILLIGRAM(S): at 18:32

## 2019-04-26 RX ADMIN — SIMVASTATIN 20 MILLIGRAM(S): 20 TABLET, FILM COATED ORAL at 21:43

## 2019-04-26 RX ADMIN — Medication 60 MILLIGRAM(S): at 21:29

## 2019-04-26 RX ADMIN — Medication 3 MILLILITER(S): at 13:00

## 2019-04-26 RX ADMIN — Medication 3 MILLILITER(S): at 20:54

## 2019-04-26 RX ADMIN — SERTRALINE 50 MILLIGRAM(S): 25 TABLET, FILM COATED ORAL at 21:43

## 2019-04-26 RX ADMIN — Medication 3 MILLILITER(S): at 14:01

## 2019-04-26 RX ADMIN — Medication 25 MILLIGRAM(S): at 21:29

## 2019-04-26 RX ADMIN — Medication 50 GRAM(S): at 13:10

## 2019-04-26 RX ADMIN — SODIUM CHLORIDE 1000 MILLILITER(S): 9 INJECTION, SOLUTION INTRAVENOUS at 14:15

## 2019-04-26 RX ADMIN — SODIUM CHLORIDE 2000 MILLILITER(S): 9 INJECTION, SOLUTION INTRAVENOUS at 16:22

## 2019-04-26 RX ADMIN — Medication 3 MILLILITER(S): at 14:40

## 2019-04-26 RX ADMIN — ENOXAPARIN SODIUM 40 MILLIGRAM(S): 100 INJECTION SUBCUTANEOUS at 20:55

## 2019-04-26 RX ADMIN — Medication 80 MILLIGRAM(S): at 21:45

## 2019-04-26 RX ADMIN — LOSARTAN POTASSIUM 25 MILLIGRAM(S): 100 TABLET, FILM COATED ORAL at 21:29

## 2019-04-26 RX ADMIN — Medication 125 MILLIGRAM(S): at 13:00

## 2019-04-26 NOTE — H&P ADULT - NSICDXPASTMEDICALHX_GEN_ALL_CORE_FT
PAST MEDICAL HISTORY:  Asthma     DM (diabetes mellitus)     History of colorectal cancer     HLD (hyperlipidemia)     HTN (hypertension)     Left nephrolithiasis

## 2019-04-26 NOTE — H&P ADULT - NSHPPHYSICALEXAM_GEN_ALL_CORE
PHYSICAL EXAM:  GEN: No acute distress  HEENT:   LUNGS: Clear to auscultation bilaterally   HEART: S1/S2 present. RRR.   ABD: Soft, non-tender, non-distended. Bowel sounds present  EXT:  NEURO: AAOX3 PHYSICAL EXAM:  GEN: No acute distress, BiPAP on face  HEENT: NCAT  LUNGS: Bilateral wheezing and rhonchi  HEART: S1/S2 present. RRR.   ABD: Soft, non-tender, non-distended. Bowel sounds present  EXT: No pitting edema  NEURO: AAOX3

## 2019-04-26 NOTE — H&P ADULT - NSICDXPASTSURGICALHX_GEN_ALL_CORE_FT
PAST SURGICAL HISTORY:  H/O colectomy     S/P  section     S/P revision of total knee, right     S/P total knee replacement, right     S/P ureteral stent placement

## 2019-04-26 NOTE — H&P ADULT - ASSESSMENT
79 yo F with PMHx of essential HTN, DM2, DLD, moderate persistent asthma, hx of colorectal carcinoma s/p colectomy, hx of recurrent L nephrolithiasis (2006, 2008, with admission in 2014 for urosepsis s/p L ureteral stent for pyonephrosis) presents to the ED For progressive SOB x few days.     #) Acute respiratory distress likely 2/2 asthma exacerbation  - On contact isolation for rule-out RVP  - Duonebs Q6H + PRN, Solumedrol 40 mg Q6H   - BiPAP Q4H On/Off + QHS  - Peak flow measurement    #) Essential hypertension    #) DM2  - FS AC+HS  - Maintain FS at 140-180    #) DLD      #) Hx of recurrent L nephrolithiasis     #) History of CRC  - Last Colonoscopy in 2017, next in 2020 due to fair prep.     #) GI ppx: PO Protonix 40 mg QD  #) DVT ppx: Lovenox 40 mg QD    #) Diet: Carbohydrate consistent, low sodium, aspiration precautions  #) Activity: OOBTC    #) Dispo: MICU monitoring  #) Full Code 77 yo F with PMHx of essential HTN, DM2, DLD, moderate persistent asthma, hx of colorectal carcinoma s/p colectomy, hx of recurrent L nephrolithiasis (2006, 2008, with admission in 2014 for urosepsis s/p L ureteral stent for pyonephrosis) presents to the ED For progressive SOB x 2 days.    #) Acute respiratory distress likely 2/2 asthma exacerbation, rule out viral   - CXR (04/26): Negative  - On contact isolation for rule-out RVP, no antibiotics for now   - Duonebs Q4H + PRN, Solumedrol 60 mg Q6H   - BiPAP Q4H (12/6) On/Off + QHS  - Peak flow measurement  - Follow-up [facility] Duplex LE     #) Essential hypertension  - Holding home dose Toprol  mg QD   - Continue with Losartan 25 mg QD, Nifedipine ER 60 mg QD, HCTZ 25 mg QD  - Follow-up 2D Echo    #) DM2  - FS AC+HS  - Maintain FS at 140-180    #) DLD  - Continue with Simvastatin 20 mg QD    #) Hx of recurrent L nephrolithiasis   - Follow-up as outpatient     #) Anxiety  - Continue with Sertraline 50 mg QD    #) History of CRC  - Last Colonoscopy in 2017, next in 2020 due to fair prep.     #) GI ppx: PO Protonix 40 mg QD  #) DVT ppx: Lovenox 40 mg QD    #) Diet: Carbohydrate consistent, low sodium, aspiration precautions, HOB 45  #) Activity: OOBTC    #) Dispo: MICU monitoring  #) Full Code

## 2019-04-26 NOTE — ED ADULT NURSE NOTE - BREATHING INTERVENTIONS
PATIENT NAME: Alon Muller [facility] #: [id number]    Lab Results   Component Value Date    CREATININE 2.3 (H) 06/22/2018     (L) 06/22/2018    BILITOT 2.2 (H) 06/22/2018    ALBUMIN 2.4 (L) 06/22/2018    INR 1.8 (H) 06/22/2018       Encephalopathy: 1 - 2  Ascites: moderate  Dialysis: no     MELD 26     Recertification requestor: Cecilia Beard                                                  BIPAP/Oxygen

## 2019-04-26 NOTE — ED PROVIDER NOTE - OBJECTIVE STATEMENT
79 yo female, pmh of htn, hld, dm, asthma, presents to ed for sob. Pt with granddaughter, states pt has had cold last several days, today became more short of breath. 77 yo female, pmh of htn, hld, dm, asthma, presents to ed for sob. Pt with granddaughter, states pt has had cold last several days, today became more short of breath. Pt denies prior hispital stays or intubations for asthma. Worse at rest, not made better with rxed inhalers. Denies fever, chills, cp, abd pain, nvd, ha, cough, numbness, tingling, dizziness, rash, or dysuria.

## 2019-04-26 NOTE — CONSULT NOTE ADULT - ASSESSMENT
IMPRESSION:    Acute respiratory failure  Asthma exacerbation triggered by viral URI ?compliance with inhalers    PLAN:    CNS: avoid sedatives    HEENT:  Oral care    PULMONARY:  HOB @ 45 degrees, BPAP 12/6 4h on 4h off, solumedrol 80 q8h, duoneb q4h and prn    CARDIOVASCULAR: repeat lactate, echo, serial cardiac enzymes    GI: PO diet while off NIV    RENAL:  F/u  lytes.  Correct as needed. accurate I/O    INFECTIOUS DISEASE: no abx for now, RVP    HEMATOLOGICAL:  DVT prophylaxis. LE duplex    ENDOCRINE:  Follow up FS.  Insulin protocol if needed.    MUSCULOSKELETAL: bedrest    LINES/SOLIS: peripherals    CODE STATUS: FULL CODE    DISPOSITION: Pt requires monitoring in the MICU

## 2019-04-26 NOTE — ED PROVIDER NOTE - PROGRESS NOTE DETAILS
trial off bipap, pt still tachypneic and uncomfortable, will go back on bipap spoke to case with icu fellow dr. chavez, will admit to icu under dr nuñez. icu resident aware of pt and admission.

## 2019-04-26 NOTE — ED PROVIDER NOTE - NS ED ROS FT
Constitutional: (-) fever, (-) chills  Eyes: (-) visual changes  Cardiovascular: (-) chest pain,  Respiratory: (-) cough, (+) shortness of breath, (-) dyspnea,   Gastrointestinal: (-) vomiting, (-) diarrhea, (-)nausea,   Musculoskeletal: (-) neck pain, (-) back pain,  Integumentary: (-) rash  Neurological: (-) headache,  (-) dizziness, (-) tingling, (-)numbness,   Peripheral Vascular: (-) leg swelling,   : (-)dysuria,   Allergic/Immunologic: (-) pruritus

## 2019-04-26 NOTE — ED PROVIDER NOTE - ATTENDING CONTRIBUTION TO CARE
cough and congestion for 1 week with worsening breathing and wheezing, no fever, productive cough. on exam she is tachpynic with acecsssoyr muscle use diffuse wheezing bilaterally, no leg swelilng, heart is nml. IMP: severe asthma/copd exacberation require bipap, nebs, steroids, mg.

## 2019-04-26 NOTE — H&P ADULT - NSHPLABSRESULTS_GEN_ALL_CORE
LABS:                        12.4   19.78 )-----------( 491      ( 26 Apr 2019 13:44 )             39.0     04-26    139  |  103  |  21<H>  ----------------------------<  256<H>  4.5   |  19  |  0.9    Ca    9.4      26 Apr 2019 13:44    TPro  8.2<H>  /  Alb  4.8  /  TBili  <0.2  /  DBili  x   /  AST  16  /  ALT  12  /  AlkPhos  78  04-26        ABG - ( 26 Apr 2019 15:50 )  pH, Arterial: 7.28  pH, Blood: x     /  pCO2: 40    /  pO2: 98    / HCO3: 19    / Base Excess: -7.7  /  SaO2: 97                Troponin T, Serum: <0.01 ng/mL (04-26-19 @ 13:44)      CARDIAC MARKERS ( 26 Apr 2019 13:44 )  x     / <0.01 ng/mL / x     / x     / x          < from: Xray Chest 1 View-PORTABLE IMMEDIATE (04.26.19 @ 15:43) >    No radiographic evidence of acute cardiopulmonary disease.    < end of copied text >

## 2019-04-26 NOTE — CONSULT NOTE ADULT - SUBJECTIVE AND OBJECTIVE BOX
Patient is a 78y old  Female who presents with a chief complaint of shortness of breath    HPI:    79 yo female, pmh of htn, hld, dm, asthma, presents to ed for sob. Pt with granddaughter, states pt has had cold last several days, today became more short of breath. Pt denies prior hispital stays or intubations for asthma. Worse at rest, not made better with rxed inhalers. Denies fever, chills, cp, abd pain, nvd, ha, cough, numbness, tingling, dizziness, rash, or dysuria. (26 Apr 2019 16:34)    Presented with dysppnea at restand chest tightness, questionable compliance with her inhalers, subjective fever and chills past few days.    In ED, respiratory distress, placed on BPAP, improved initially with nebs and steroid but didnt tolerate being off BPAP.    PAST MEDICAL & SURGICAL HISTORY:  DM (diabetes mellitus)  HLD (hyperlipidemia)  HTN (hypertension)  Asthma      SOCIAL HX:   Smoking  no      ETOH/Illicit drugs  no          FAMILY HISTORY:  :    No known cardiovascular or pulmonary family history     ROS:  See HPI     Allergies    No Known Drug Allergies  Peaches (Rash)    Intolerances    PHYSICAL EXAM    ICU Vital Signs Last 24 Hrs  T(C): 35.9 (26 Apr 2019 13:23), Max: 35.9 (26 Apr 2019 13:23)  T(F): 96.6 (26 Apr 2019 13:23), Max: 96.6 (26 Apr 2019 13:23)  HR: 92 (26 Apr 2019 13:48) (90 - 97)  BP: 154/67 (26 Apr 2019 13:48) (154/67 - 169/79)  RR: 22 (26 Apr 2019 13:48) (22 - 22)  SpO2: 100% (26 Apr 2019 13:48) (93% - 100%)      General: Not in distress  HEENT:  MARBELLA              Lymphatic system: No LN  Lungs: Bilateral BS b/l wheeze and rhonchi, poor air entry  Cardiovascular: Regular  Gastrointestinal: Soft, Positive BS  Musculoskeletal: No clubbing.  Moves all extremities.    Skin: Warm.  Intact  Neurological: No motor or sensory deficit nonfocal        LABS:                          12.4   19.78 )-----------( 491      ( 26 Apr 2019 13:44 )             39.0                                               04-26    139  |  103  |  21<H>  ----------------------------<  256<H>  4.5   |  19  |  0.9    Ca    9.4      26 Apr 2019 13:44    TPro  8.2<H>  /  Alb  4.8  /  TBili  <0.2  /  DBili  x   /  AST  16  /  ALT  12  /  AlkPhos  78  04-26                                                 CARDIAC MARKERS ( 26 Apr 2019 13:44 )  x     / <0.01 ng/mL / x     / x     / x                                                LIVER FUNCTIONS - ( 26 Apr 2019 13:44 )  Alb: 4.8 g/dL / Pro: 8.2 g/dL / ALK PHOS: 78 U/L / ALT: 12 U/L / AST: 16 U/L / GGT: x                                                                                                                                   ABG - ( 26 Apr 2019 15:50 )  pH, Arterial: 7.28  pH, Blood: x     /  pCO2: 40    /  pO2: 98    / HCO3: 19    / Base Excess: -7.7  /  SaO2: 97          CXR: unremarkable    MEDICATIONS  (STANDING):    MEDICATIONS  (PRN):

## 2019-04-26 NOTE — ED PROVIDER NOTE - CLINICAL SUMMARY MEDICAL DECISION MAKING FREE TEXT BOX
evaluated for asthma exacerbation, severe, requireing multiple nebs, steroids and bipap, attempted to wean off bipap but she was not able to tolerate being off bipap so it was replaced. patient was admitted to ICU.

## 2019-04-26 NOTE — H&P ADULT - HISTORY OF PRESENT ILLNESS
79 yo female, pmh of htn, hld, dm, asthma, presents to ed for sob. Pt with granddaughter, states pt has had cold last several days, today became more short of breath. Pt denies prior hispital stays or intubations for asthma. Worse at rest, not made better with rxed inhalers. Denies fever, chills, cp, abd pain, nvd, ha, cough, numbness, tingling, dizziness, rash, or dysuria. 79 yo F with PMHx of essential HTN, DM2, DLD, moderate persistent asthma, hx of colorectal carcinoma s/p colectomy, hx of recurrent L nephrolithiasis (2006, 2008, with admission in 2014 for urosepsis s/p L ureteral stent for pyonephrosis) presents to the ED For progressive SOB x few days. As per patient's granddaughter, she had been dealing with a cold for several pasts, but became more short of breath. Patient denies prior hospitalization or intubation. Her shortness of breath is worse at rest, not improved with prescribed inhalers. She denies fever, chills, chest pain, abdominal pain, N/V/D, cough, numbness, dizziness, rash, dysuria.     Upon admission, she received 2 L LR, 2 g Mg, Solumedrol 125 x 1, nebs x 3. She was unable to be weaned off BiPAP so she is to be admitted to ICU for close observation. 79 yo F with PMHx of essential HTN, DM2, DLD, moderate persistent asthma, hx of colorectal carcinoma s/p colectomy, hx of recurrent L nephrolithiasis (2006, 2008, with admission in 2014 for urosepsis s/p L ureteral stent for pyonephrosis) presents to the ED For progressive SOB x 2 days. As per patient's granddaughter, she had been dealing with a cold for several pasts, but became more short of breath. Patient denies prior hospitalization or intubation. Her shortness of breath was not improved with prescribed inhaler (Advair 250/50); +productive cough of clear sputum and subjective fever last night. She denies chills, chest pain, abdominal pain, N/V/D, numbness, rash, dysuria. Normally, she uses her inhaler 1-2 times per day with 2 nighttime awakenings in a month. She denies any sick contacts as she only lives with her granddaughter. No recent travel. She has 1 dog who she had for 11 years, small Jose-Fred-Rat Terrier. She ambulates with a walker and cane.     Upon admission, she received 2 L LR, 2 g Mg, Solumedrol 125 x 1, nebs x 3. She was unable to be weaned off BiPAP so she is to be admitted to ICU for close observation.

## 2019-04-26 NOTE — ED ADULT NURSE NOTE - OBJECTIVE STATEMENT
patient presented with shortness of breath, difficulty breathing since last night. Neb treatments given at home with no relief. Worse this am. Continues on oxygen and BIPAP.

## 2019-04-27 LAB
ALBUMIN SERPL ELPH-MCNC: 4.1 G/DL — SIGNIFICANT CHANGE UP (ref 3.5–5.2)
ALP SERPL-CCNC: 69 U/L — SIGNIFICANT CHANGE UP (ref 30–115)
ALT FLD-CCNC: 10 U/L — SIGNIFICANT CHANGE UP (ref 0–41)
ANION GAP SERPL CALC-SCNC: 17 MMOL/L — HIGH (ref 7–14)
AST SERPL-CCNC: 12 U/L — SIGNIFICANT CHANGE UP (ref 0–41)
BASOPHILS # BLD AUTO: 0.03 K/UL — SIGNIFICANT CHANGE UP (ref 0–0.2)
BASOPHILS NFR BLD AUTO: 0.2 % — SIGNIFICANT CHANGE UP (ref 0–1)
BILIRUB SERPL-MCNC: <0.2 MG/DL — SIGNIFICANT CHANGE UP (ref 0.2–1.2)
BUN SERPL-MCNC: 18 MG/DL — SIGNIFICANT CHANGE UP (ref 10–20)
CALCIUM SERPL-MCNC: 9.1 MG/DL — SIGNIFICANT CHANGE UP (ref 8.5–10.1)
CHLORIDE SERPL-SCNC: 103 MMOL/L — SIGNIFICANT CHANGE UP (ref 98–110)
CO2 SERPL-SCNC: 19 MMOL/L — SIGNIFICANT CHANGE UP (ref 17–32)
CREAT SERPL-MCNC: 0.9 MG/DL — SIGNIFICANT CHANGE UP (ref 0.7–1.5)
EOSINOPHIL # BLD AUTO: 0.01 K/UL — SIGNIFICANT CHANGE UP (ref 0–0.7)
EOSINOPHIL NFR BLD AUTO: 0.1 % — SIGNIFICANT CHANGE UP (ref 0–8)
ESTIMATED AVERAGE GLUCOSE: 189 MG/DL — HIGH (ref 68–114)
GLUCOSE BLDC GLUCOMTR-MCNC: 193 MG/DL — HIGH (ref 70–99)
GLUCOSE BLDC GLUCOMTR-MCNC: 282 MG/DL — HIGH (ref 70–99)
GLUCOSE BLDC GLUCOMTR-MCNC: 311 MG/DL — HIGH (ref 70–99)
GLUCOSE BLDC GLUCOMTR-MCNC: 368 MG/DL — HIGH (ref 70–99)
GLUCOSE BLDC GLUCOMTR-MCNC: 436 MG/DL — HIGH (ref 70–99)
GLUCOSE SERPL-MCNC: 321 MG/DL — HIGH (ref 70–99)
HBA1C BLD-MCNC: 8.2 % — HIGH (ref 4–5.6)
HCT VFR BLD CALC: 34.1 % — LOW (ref 37–47)
HGB BLD-MCNC: 10.8 G/DL — LOW (ref 12–16)
IMM GRANULOCYTES NFR BLD AUTO: 1.4 % — HIGH (ref 0.1–0.3)
LACTATE SERPL-SCNC: 2.5 MMOL/L — HIGH (ref 0.5–2.2)
LYMPHOCYTES # BLD AUTO: 14 % — LOW (ref 20.5–51.1)
LYMPHOCYTES # BLD AUTO: 2.17 K/UL — SIGNIFICANT CHANGE UP (ref 1.2–3.4)
MAGNESIUM SERPL-MCNC: 2.4 MG/DL — SIGNIFICANT CHANGE UP (ref 1.8–2.4)
MCHC RBC-ENTMCNC: 27.8 PG — SIGNIFICANT CHANGE UP (ref 27–31)
MCHC RBC-ENTMCNC: 31.7 G/DL — LOW (ref 32–37)
MCV RBC AUTO: 87.7 FL — SIGNIFICANT CHANGE UP (ref 81–99)
MONOCYTES # BLD AUTO: 0.47 K/UL — SIGNIFICANT CHANGE UP (ref 0.1–0.6)
MONOCYTES NFR BLD AUTO: 3 % — SIGNIFICANT CHANGE UP (ref 1.7–9.3)
NEUTROPHILS # BLD AUTO: 12.61 K/UL — HIGH (ref 1.4–6.5)
NEUTROPHILS NFR BLD AUTO: 81.3 % — HIGH (ref 42.2–75.2)
NRBC # BLD: 0 /100 WBCS — SIGNIFICANT CHANGE UP (ref 0–0)
PLATELET # BLD AUTO: 398 K/UL — SIGNIFICANT CHANGE UP (ref 130–400)
POTASSIUM SERPL-MCNC: 4 MMOL/L — SIGNIFICANT CHANGE UP (ref 3.5–5)
POTASSIUM SERPL-SCNC: 4 MMOL/L — SIGNIFICANT CHANGE UP (ref 3.5–5)
PROT SERPL-MCNC: 7 G/DL — SIGNIFICANT CHANGE UP (ref 6–8)
RAPID RVP RESULT: DETECTED
RBC # BLD: 3.89 M/UL — LOW (ref 4.2–5.4)
RBC # FLD: 13.4 % — SIGNIFICANT CHANGE UP (ref 11.5–14.5)
RV+EV RNA SPEC QL NAA+PROBE: DETECTED
SODIUM SERPL-SCNC: 139 MMOL/L — SIGNIFICANT CHANGE UP (ref 135–146)
TROPONIN T SERPL-MCNC: <0.01 NG/ML — SIGNIFICANT CHANGE UP
WBC # BLD: 15.5 K/UL — HIGH (ref 4.8–10.8)
WBC # FLD AUTO: 15.5 K/UL — HIGH (ref 4.8–10.8)

## 2019-04-27 PROCEDURE — 71045 X-RAY EXAM CHEST 1 VIEW: CPT | Mod: 26

## 2019-04-27 RX ORDER — SODIUM CHLORIDE 9 MG/ML
1000 INJECTION, SOLUTION INTRAVENOUS
Qty: 0 | Refills: 0 | Status: DISCONTINUED | OUTPATIENT
Start: 2019-04-27 | End: 2019-04-28

## 2019-04-27 RX ORDER — DEXTROSE 50 % IN WATER 50 %
25 SYRINGE (ML) INTRAVENOUS ONCE
Qty: 0 | Refills: 0 | Status: DISCONTINUED | OUTPATIENT
Start: 2019-04-27 | End: 2019-04-28

## 2019-04-27 RX ORDER — INSULIN GLARGINE 100 [IU]/ML
10 INJECTION, SOLUTION SUBCUTANEOUS EVERY MORNING
Qty: 0 | Refills: 0 | Status: DISCONTINUED | OUTPATIENT
Start: 2019-04-27 | End: 2019-04-28

## 2019-04-27 RX ORDER — GLUCAGON INJECTION, SOLUTION 0.5 MG/.1ML
1 INJECTION, SOLUTION SUBCUTANEOUS ONCE
Qty: 0 | Refills: 0 | Status: DISCONTINUED | OUTPATIENT
Start: 2019-04-27 | End: 2019-04-28

## 2019-04-27 RX ORDER — BUDESONIDE AND FORMOTEROL FUMARATE DIHYDRATE 160; 4.5 UG/1; UG/1
2 AEROSOL RESPIRATORY (INHALATION)
Qty: 0 | Refills: 0 | Status: DISCONTINUED | OUTPATIENT
Start: 2019-04-27 | End: 2019-05-02

## 2019-04-27 RX ORDER — INSULIN LISPRO 100/ML
10 VIAL (ML) SUBCUTANEOUS ONCE
Qty: 0 | Refills: 0 | Status: COMPLETED | OUTPATIENT
Start: 2019-04-27 | End: 2019-04-27

## 2019-04-27 RX ORDER — DEXTROSE 50 % IN WATER 50 %
15 SYRINGE (ML) INTRAVENOUS ONCE
Qty: 0 | Refills: 0 | Status: DISCONTINUED | OUTPATIENT
Start: 2019-04-27 | End: 2019-04-28

## 2019-04-27 RX ORDER — DEXTROSE 50 % IN WATER 50 %
12.5 SYRINGE (ML) INTRAVENOUS ONCE
Qty: 0 | Refills: 0 | Status: DISCONTINUED | OUTPATIENT
Start: 2019-04-27 | End: 2019-04-28

## 2019-04-27 RX ORDER — INSULIN LISPRO 100/ML
3 VIAL (ML) SUBCUTANEOUS
Qty: 0 | Refills: 0 | Status: DISCONTINUED | OUTPATIENT
Start: 2019-04-27 | End: 2019-04-28

## 2019-04-27 RX ORDER — INSULIN LISPRO 100/ML
VIAL (ML) SUBCUTANEOUS
Qty: 0 | Refills: 0 | Status: DISCONTINUED | OUTPATIENT
Start: 2019-04-27 | End: 2019-04-28

## 2019-04-27 RX ADMIN — Medication 3 UNIT(S): at 16:13

## 2019-04-27 RX ADMIN — Medication 3 MILLILITER(S): at 01:33

## 2019-04-27 RX ADMIN — Medication 3 MILLILITER(S): at 08:24

## 2019-04-27 RX ADMIN — Medication 3 MILLILITER(S): at 21:41

## 2019-04-27 RX ADMIN — Medication 3 MILLILITER(S): at 05:13

## 2019-04-27 RX ADMIN — Medication 60 MILLIGRAM(S): at 06:18

## 2019-04-27 RX ADMIN — Medication 3 MILLILITER(S): at 16:55

## 2019-04-27 RX ADMIN — Medication 10: at 10:31

## 2019-04-27 RX ADMIN — SERTRALINE 50 MILLIGRAM(S): 25 TABLET, FILM COATED ORAL at 12:13

## 2019-04-27 RX ADMIN — LOSARTAN POTASSIUM 25 MILLIGRAM(S): 100 TABLET, FILM COATED ORAL at 06:18

## 2019-04-27 RX ADMIN — Medication 25 MILLIGRAM(S): at 06:18

## 2019-04-27 RX ADMIN — Medication 2: at 16:13

## 2019-04-27 RX ADMIN — Medication 60 MILLIGRAM(S): at 17:59

## 2019-04-27 RX ADMIN — Medication 10 UNIT(S): at 12:14

## 2019-04-27 RX ADMIN — Medication 3 UNIT(S): at 10:30

## 2019-04-27 RX ADMIN — ENOXAPARIN SODIUM 40 MILLIGRAM(S): 100 INJECTION SUBCUTANEOUS at 12:13

## 2019-04-27 RX ADMIN — SIMVASTATIN 20 MILLIGRAM(S): 20 TABLET, FILM COATED ORAL at 21:58

## 2019-04-27 RX ADMIN — Medication 80 MILLIGRAM(S): at 06:18

## 2019-04-27 RX ADMIN — PANTOPRAZOLE SODIUM 40 MILLIGRAM(S): 20 TABLET, DELAYED RELEASE ORAL at 06:19

## 2019-04-27 RX ADMIN — INSULIN GLARGINE 10 UNIT(S): 100 INJECTION, SOLUTION SUBCUTANEOUS at 12:13

## 2019-04-27 RX ADMIN — CHLORHEXIDINE GLUCONATE 1 APPLICATION(S): 213 SOLUTION TOPICAL at 06:17

## 2019-04-27 RX ADMIN — BUDESONIDE AND FORMOTEROL FUMARATE DIHYDRATE 2 PUFF(S): 160; 4.5 AEROSOL RESPIRATORY (INHALATION) at 21:58

## 2019-04-27 NOTE — PROGRESS NOTE ADULT - SUBJECTIVE AND OBJECTIVE BOX
SUBJECTIVE:    Patient is a 78y old Female who presents with a chief complaint of Shortness of breath (2019 16:34)    Currently admitted to medicine with the primary diagnosis of Asthma exacerbation    -sleeping comfortably on nasal cannula 2L     Besides the pertinent positives and negatives described above, the ROS was within normal limits.    PAST MEDICAL & SURGICAL HISTORY  Left nephrolithiasis  History of colorectal cancer  DM (diabetes mellitus)  HLD (hyperlipidemia)  HTN (hypertension)  Asthma  S/P  section  S/P ureteral stent placement  S/P revision of total knee, right  S/P total knee replacement, right  H/O colectomy    SOCIAL HISTORY:    ALLERGIES:  No Known Drug Allergies  Peaches (Rash)    MEDICATIONS:  STANDING MEDICATIONS  ALBUTerol    90 MICROgram(s) HFA Inhaler 1 Puff(s) Inhalation every 4 hours  ALBUTerol/ipratropium for Nebulization 3 milliLiter(s) Nebulizer every 4 hours  chlorhexidine 4% Liquid 1 Application(s) Topical <User Schedule>  enoxaparin Injectable 40 milliGRAM(s) SubCutaneous daily  hydrochlorothiazide 25 milliGRAM(s) Oral daily  losartan 25 milliGRAM(s) Oral daily  methylPREDNISolone sodium succinate Injectable 80 milliGRAM(s) IV Push every 8 hours  NIFEdipine XL 60 milliGRAM(s) Oral daily  pantoprazole    Tablet 40 milliGRAM(s) Oral before breakfast  sertraline 50 milliGRAM(s) Oral daily  simvastatin 20 milliGRAM(s) Oral at bedtime  tiotropium 18 MICROgram(s) Capsule 1 Capsule(s) Inhalation daily    PRN MEDICATIONS  ALBUTerol/ipratropium for Nebulization 3 milliLiter(s) Nebulizer every 4 hours PRN    VITALS:   T(F): 98.4  HR: 92  BP: 112/58  RR: 27  SpO2: 100%    LABS:                        12.4   19.78 )-----------( 491      ( 2019 13:44 )             39.0         139  |  103  |  21<H>  ----------------------------<  256<H>  4.5   |  19  |  0.9    Ca    9.4      2019 13:44    TPro  8.2<H>  /  Alb  4.8  /  TBili  <0.2  /  DBili  x   /  AST  16  /  ALT  12  /  AlkPhos  78          ABG - ( 2019 15:50 )  pH, Arterial: 7.28  pH, Blood: x     /  pCO2: 40    /  pO2: 98    / HCO3: 19    / Base Excess: -7.7  /  SaO2: 97                Troponin T, Serum: <0.01 ng/mL (19 @ 20:21)  Lactate, Blood: 3.4 mmol/L <H> (19 @ 20:21)  Troponin T, Serum: <0.01 ng/mL (19 @ 13:44)      CARDIAC MARKERS ( 2019 20:21 )  x     / <0.01 ng/mL / x     / x     / x      CARDIAC MARKERS ( 2019 13:44 )  x     / <0.01 ng/mL / x     / x     / x          RADIOLOGY:    PHYSICAL EXAM:  GEN: No acute distress  LUNGS: Clear to auscultation bilaterally   HEART: Regular  ABD: Soft, non-tender, non-distended.  EXT: NC/NE/2+PP/CAMPOS/Skin Intact.   NEURO: AAOX3    Intravenous access: yes  NG tube: no  Schneider Catheter: no

## 2019-04-27 NOTE — PROGRESS NOTE ADULT - SUBJECTIVE AND OBJECTIVE BOX
Patient is a 78y old  Female who presents with a chief complaint of Shortness of breath (27 Apr 2019 03:34)      Over Night Events:  Patient seen and examined feel much better still has mild wheeze lying flat       ROS:  See HPI    PHYSICAL EXAM    ICU Vital Signs Last 24 Hrs  T(C): 36.3 (27 Apr 2019 08:01), Max: 37.2 (26 Apr 2019 19:00)  T(F): 97.3 (27 Apr 2019 08:01), Max: 99 (26 Apr 2019 19:00)  HR: 98 (27 Apr 2019 08:01) (84 - 114)  BP: 107/62 (27 Apr 2019 08:01) (102/55 - 183/82)  BP(mean): 79 (27 Apr 2019 08:01) (72 - 106)  ABP: --  ABP(mean): --  RR: 21 (27 Apr 2019 08:01) (21 - 31)  SpO2: 100% (27 Apr 2019 08:01) (93% - 100%)      General: Aox3  HEENT:       jennifer         Lymph Nodes: NO cervical LN   Lungs: Bilateral wheezing   Cardiovascular: Regular   Abdomen: Soft, Positive BS  Extremities: No clubbing   Skin: warm   Neurological: no focal   Musculoskeletal: move all ext     I&O's Detail    26 Apr 2019 07:01  -  27 Apr 2019 07:00  --------------------------------------------------------  IN:  Total IN: 0 mL    OUT:    Voided: 300 mL  Total OUT: 300 mL    Total NET: -300 mL          LABS:                          10.8   15.50 )-----------( 398      ( 27 Apr 2019 06:36 )             34.1         27 Apr 2019 06:36    139    |  103    |  18     ----------------------------<  321    4.0     |  19     |  0.9      Ca    9.1        27 Apr 2019 06:36  Mg     2.4       27 Apr 2019 06:36    TPro  7.0    /  Alb  4.1    /  TBili  <0.2   /  DBili  x      /  AST  12     /  ALT  10     /  AlkPhos  69     27 Apr 2019 06:36  Amylase x     lipase x                                                                                            Lactate, Blood: 2.5 mmol/L (04-27-19 @ 06:36)  Lactate, Blood: 3.4 mmol/L (04-26-19 @ 20:21)    CARDIAC MARKERS ( 27 Apr 2019 06:36 )  x     / <0.01 ng/mL / x     / x     / x      CARDIAC MARKERS ( 26 Apr 2019 20:21 )  x     / <0.01 ng/mL / x     / x     / x      CARDIAC MARKERS ( 26 Apr 2019 13:44 )  x     / <0.01 ng/mL / x     / x     / x                                                                                                                                             ABG - ( 26 Apr 2019 15:50 )  pH, Arterial: 7.28  pH, Blood: x     /  pCO2: 40    /  pO2: 98    / HCO3: 19    / Base Excess: -7.7  /  SaO2: 97                  MEDICATIONS  (STANDING):  ALBUTerol    90 MICROgram(s) HFA Inhaler 1 Puff(s) Inhalation every 4 hours  ALBUTerol/ipratropium for Nebulization 3 milliLiter(s) Nebulizer every 4 hours  chlorhexidine 4% Liquid 1 Application(s) Topical <User Schedule>  enoxaparin Injectable 40 milliGRAM(s) SubCutaneous daily  hydrochlorothiazide 25 milliGRAM(s) Oral daily  losartan 25 milliGRAM(s) Oral daily  methylPREDNISolone sodium succinate Injectable 80 milliGRAM(s) IV Push every 8 hours  NIFEdipine XL 60 milliGRAM(s) Oral daily  pantoprazole    Tablet 40 milliGRAM(s) Oral before breakfast  sertraline 50 milliGRAM(s) Oral daily  simvastatin 20 milliGRAM(s) Oral at bedtime  tiotropium 18 MICROgram(s) Capsule 1 Capsule(s) Inhalation daily    MEDICATIONS  (PRN):  ALBUTerol/ipratropium for Nebulization 3 milliLiter(s) Nebulizer every 4 hours PRN Shortness of Breath and/or Wheezing          Xrays:  TLC:  OG:  ET tube:                                                                                    no infiltarte    ECHO:  [facility] ICU:

## 2019-04-27 NOTE — PROGRESS NOTE ADULT - ASSESSMENT
IMPRESSION:    Acute respiratory failure  Asthma exacerbation triggered by viral URI ?compliance with inhalers    PLAN:    CNS: avoid sedatives    HEENT:  Oral care    PULMONARY:  HOB @ 45 degrees, nebulizer Q4 hrs and prn start symbicort 160 mg Q 12 hrs   lower solumedrol to 60 mg QA 12 hrs   bipap prn if needed     CARDIOVASCULAR:  echo, serial cardiac enzymes    GI: PO diet     RENAL:  F/u  lytes.  Correct as needed. accurate I/O    INFECTIOUS DISEASE: no abx for now, RVP    HEMATOLOGICAL:  DVT prophylaxis. LE duplex    ENDOCRINE:  Follow up FS.  Insulin protocol if needed. start lantus for now follow FS     MUSCULOSKELETAL: bedrest    LINES/SOLIS: peripherals    CODE STATUS: FULL CODE    DISPOSITION: Pt requires monitoring in the MICU  OOb to chair

## 2019-04-27 NOTE — PROGRESS NOTE ADULT - ASSESSMENT
77 yo F with PMHx of essential HTN, DM2, DLD, moderate persistent asthma, hx of colorectal carcinoma s/p colectomy, hx of recurrent L nephrolithiasis (2006, 2008, with admission in 2014 for urosepsis s/p L ureteral stent for pyonephrosis) presents to the ED For progressive SOB x 2 days likely secondary to asthma exacerbation    #) Acute respiratory distress likely 2/2 asthma exacerbation, rule out viral   - CXR (04/26): Negative  - On contact isolation for rule-out RVP (pending), no antibiotics for now   - Duonebs Q4H + PRN, Solumedrol 60 mg Q6H   - BiPAP Q4H (12/6) On/Off + QHS, however has been sleeping with nasal cannula 2L tonight  - Peak flow measurement  - Follow-up [facility] Duplex LE     # Lactic acidosis likely type B  -ABG does not show hypoxemia, likely type B lactic acidosis from albuterol  -f/u AM lactate    #) Essential hypertension  - Holding home dose Toprol  mg QD   - Continue with Losartan 25 mg QD, Nifedipine ER 60 mg QD, HCTZ 25 mg QD  - Follow-up 2D Echo  BP x 24 hours: BP:  (112/58 - 183/82)  -stable    #) DM2  - FS AC+HS  - Maintain FS at 140-180    #) DLD  - Continue with Simvastatin 20 mg QD    #) Hx of recurrent L nephrolithiasis   - Follow-up as outpatient     #) Anxiety  - Continue with Sertraline 50 mg QD    #) History of CRC  - Last Colonoscopy in 2017, next in 2020 due to fair prep.     #) GI ppx: PO Protonix 40 mg QD  #) DVT ppx: Lovenox 40 mg QD    #) Diet: Carbohydrate consistent, low sodium, aspiration precautions, HOB 45  #) Activity: OOBTC    #) Dispo: MICU monitoring  #) Full Code    PLAN: f/u respiratory status tomorrow, possible downgrade not needing bipap right now, f/u AM lactate/RVP/LE doppler, will need pulm outpatient follow up

## 2019-04-28 LAB
ALBUMIN SERPL ELPH-MCNC: 3.7 G/DL — SIGNIFICANT CHANGE UP (ref 3.5–5.2)
ALP SERPL-CCNC: 59 U/L — SIGNIFICANT CHANGE UP (ref 30–115)
ALT FLD-CCNC: 8 U/L — SIGNIFICANT CHANGE UP (ref 0–41)
ANION GAP SERPL CALC-SCNC: 14 MMOL/L — SIGNIFICANT CHANGE UP (ref 7–14)
AST SERPL-CCNC: 10 U/L — SIGNIFICANT CHANGE UP (ref 0–41)
BILIRUB SERPL-MCNC: <0.2 MG/DL — SIGNIFICANT CHANGE UP (ref 0.2–1.2)
BUN SERPL-MCNC: 39 MG/DL — HIGH (ref 10–20)
CALCIUM SERPL-MCNC: 8.8 MG/DL — SIGNIFICANT CHANGE UP (ref 8.5–10.1)
CHLORIDE SERPL-SCNC: 103 MMOL/L — SIGNIFICANT CHANGE UP (ref 98–110)
CO2 SERPL-SCNC: 20 MMOL/L — SIGNIFICANT CHANGE UP (ref 17–32)
CREAT SERPL-MCNC: 1.1 MG/DL — SIGNIFICANT CHANGE UP (ref 0.7–1.5)
GLUCOSE BLDC GLUCOMTR-MCNC: 228 MG/DL — HIGH (ref 70–99)
GLUCOSE BLDC GLUCOMTR-MCNC: 248 MG/DL — HIGH (ref 70–99)
GLUCOSE BLDC GLUCOMTR-MCNC: 329 MG/DL — HIGH (ref 70–99)
GLUCOSE BLDC GLUCOMTR-MCNC: 392 MG/DL — HIGH (ref 70–99)
GLUCOSE SERPL-MCNC: 360 MG/DL — HIGH (ref 70–99)
HCT VFR BLD CALC: 30.6 % — LOW (ref 37–47)
HGB BLD-MCNC: 9.8 G/DL — LOW (ref 12–16)
MAGNESIUM SERPL-MCNC: 2.2 MG/DL — SIGNIFICANT CHANGE UP (ref 1.8–2.4)
MCHC RBC-ENTMCNC: 28.3 PG — SIGNIFICANT CHANGE UP (ref 27–31)
MCHC RBC-ENTMCNC: 32 G/DL — SIGNIFICANT CHANGE UP (ref 32–37)
MCV RBC AUTO: 88.4 FL — SIGNIFICANT CHANGE UP (ref 81–99)
NRBC # BLD: 0 /100 WBCS — SIGNIFICANT CHANGE UP (ref 0–0)
PLATELET # BLD AUTO: 360 K/UL — SIGNIFICANT CHANGE UP (ref 130–400)
POTASSIUM SERPL-MCNC: 4.5 MMOL/L — SIGNIFICANT CHANGE UP (ref 3.5–5)
POTASSIUM SERPL-SCNC: 4.5 MMOL/L — SIGNIFICANT CHANGE UP (ref 3.5–5)
PROT SERPL-MCNC: 6.2 G/DL — SIGNIFICANT CHANGE UP (ref 6–8)
RBC # BLD: 3.46 M/UL — LOW (ref 4.2–5.4)
RBC # FLD: 13.5 % — SIGNIFICANT CHANGE UP (ref 11.5–14.5)
SODIUM SERPL-SCNC: 137 MMOL/L — SIGNIFICANT CHANGE UP (ref 135–146)
WBC # BLD: 21.45 K/UL — HIGH (ref 4.8–10.8)
WBC # FLD AUTO: 21.45 K/UL — HIGH (ref 4.8–10.8)

## 2019-04-28 PROCEDURE — 71045 X-RAY EXAM CHEST 1 VIEW: CPT | Mod: 26

## 2019-04-28 PROCEDURE — 93970 EXTREMITY STUDY: CPT | Mod: 26

## 2019-04-28 RX ORDER — DEXTROSE 50 % IN WATER 50 %
15 SYRINGE (ML) INTRAVENOUS ONCE
Qty: 0 | Refills: 0 | Status: DISCONTINUED | OUTPATIENT
Start: 2019-04-28 | End: 2019-05-02

## 2019-04-28 RX ORDER — INSULIN LISPRO 100/ML
8 VIAL (ML) SUBCUTANEOUS
Qty: 0 | Refills: 0 | Status: DISCONTINUED | OUTPATIENT
Start: 2019-04-28 | End: 2019-05-02

## 2019-04-28 RX ORDER — SODIUM CHLORIDE 9 MG/ML
1000 INJECTION, SOLUTION INTRAVENOUS
Qty: 0 | Refills: 0 | Status: DISCONTINUED | OUTPATIENT
Start: 2019-04-28 | End: 2019-05-02

## 2019-04-28 RX ORDER — DEXTROSE 50 % IN WATER 50 %
25 SYRINGE (ML) INTRAVENOUS ONCE
Qty: 0 | Refills: 0 | Status: DISCONTINUED | OUTPATIENT
Start: 2019-04-28 | End: 2019-05-02

## 2019-04-28 RX ORDER — DEXTROSE 50 % IN WATER 50 %
12.5 SYRINGE (ML) INTRAVENOUS ONCE
Qty: 0 | Refills: 0 | Status: DISCONTINUED | OUTPATIENT
Start: 2019-04-28 | End: 2019-05-02

## 2019-04-28 RX ORDER — INSULIN GLARGINE 100 [IU]/ML
24 INJECTION, SOLUTION SUBCUTANEOUS AT BEDTIME
Qty: 0 | Refills: 0 | Status: DISCONTINUED | OUTPATIENT
Start: 2019-04-28 | End: 2019-05-02

## 2019-04-28 RX ORDER — INSULIN LISPRO 100/ML
8 VIAL (ML) SUBCUTANEOUS ONCE
Qty: 0 | Refills: 0 | Status: COMPLETED | OUTPATIENT
Start: 2019-04-28 | End: 2019-04-28

## 2019-04-28 RX ORDER — INSULIN LISPRO 100/ML
5 VIAL (ML) SUBCUTANEOUS
Qty: 0 | Refills: 0 | Status: DISCONTINUED | OUTPATIENT
Start: 2019-04-28 | End: 2019-04-28

## 2019-04-28 RX ORDER — INSULIN GLARGINE 100 [IU]/ML
15 INJECTION, SOLUTION SUBCUTANEOUS AT BEDTIME
Qty: 0 | Refills: 0 | Status: DISCONTINUED | OUTPATIENT
Start: 2019-04-28 | End: 2019-04-28

## 2019-04-28 RX ORDER — GLUCAGON INJECTION, SOLUTION 0.5 MG/.1ML
1 INJECTION, SOLUTION SUBCUTANEOUS ONCE
Qty: 0 | Refills: 0 | Status: DISCONTINUED | OUTPATIENT
Start: 2019-04-28 | End: 2019-05-02

## 2019-04-28 RX ORDER — INSULIN LISPRO 100/ML
VIAL (ML) SUBCUTANEOUS
Qty: 0 | Refills: 0 | Status: DISCONTINUED | OUTPATIENT
Start: 2019-04-28 | End: 2019-05-02

## 2019-04-28 RX ADMIN — ENOXAPARIN SODIUM 40 MILLIGRAM(S): 100 INJECTION SUBCUTANEOUS at 12:01

## 2019-04-28 RX ADMIN — Medication 3 MILLILITER(S): at 05:37

## 2019-04-28 RX ADMIN — Medication 8 UNIT(S): at 17:28

## 2019-04-28 RX ADMIN — Medication 60 MILLIGRAM(S): at 06:23

## 2019-04-28 RX ADMIN — Medication 8 UNIT(S): at 12:55

## 2019-04-28 RX ADMIN — Medication 3 MILLILITER(S): at 22:11

## 2019-04-28 RX ADMIN — SIMVASTATIN 20 MILLIGRAM(S): 20 TABLET, FILM COATED ORAL at 22:12

## 2019-04-28 RX ADMIN — Medication 3 MILLILITER(S): at 15:57

## 2019-04-28 RX ADMIN — Medication 3 MILLILITER(S): at 12:33

## 2019-04-28 RX ADMIN — SERTRALINE 50 MILLIGRAM(S): 25 TABLET, FILM COATED ORAL at 12:02

## 2019-04-28 RX ADMIN — LOSARTAN POTASSIUM 25 MILLIGRAM(S): 100 TABLET, FILM COATED ORAL at 06:23

## 2019-04-28 RX ADMIN — Medication 60 MILLIGRAM(S): at 05:31

## 2019-04-28 RX ADMIN — Medication 4: at 17:28

## 2019-04-28 RX ADMIN — PANTOPRAZOLE SODIUM 40 MILLIGRAM(S): 20 TABLET, DELAYED RELEASE ORAL at 06:23

## 2019-04-28 RX ADMIN — INSULIN GLARGINE 24 UNIT(S): 100 INJECTION, SOLUTION SUBCUTANEOUS at 22:12

## 2019-04-28 RX ADMIN — Medication 5 UNIT(S): at 08:25

## 2019-04-28 RX ADMIN — BUDESONIDE AND FORMOTEROL FUMARATE DIHYDRATE 2 PUFF(S): 160; 4.5 AEROSOL RESPIRATORY (INHALATION) at 08:28

## 2019-04-28 RX ADMIN — Medication 40 MILLIGRAM(S): at 17:28

## 2019-04-28 RX ADMIN — Medication 25 MILLIGRAM(S): at 06:23

## 2019-04-28 NOTE — PROGRESS NOTE ADULT - ASSESSMENT
IMPRESSION:    Acute respiratory failure  Asthma exacerbation triggered by viral URI enetro    PLAN:    CNS: avoid sedatives    HEENT:  Oral care    PULMONARY:  HOB @ 45 degrees, nebulizer Q4 hrs and prn  symbicort 160 mg Q 12 hrs   lower solumedrol to 40 mg QA 12 hrs   bipap prn if needed     CARDIOVASCULAR:  echo, serial cardiac enzymes    GI: PO diet     RENAL:  F/u  lytes.  Correct as needed. accurate I/O    INFECTIOUS DISEASE: no abx for now,    HEMATOLOGICAL:  DVT prophylaxis. LE duplex    ENDOCRINE:  Follow up FS.  Insulin protocol if needed. start lantus for now follow FS     MUSCULOSKELETAL: bedrest    LINES/SOLIS: peripherals    CODE STATUS: FULL CODE    DISPOSITION: Pt requires monitoring in the MICU  OOb to chair   downgrade floor

## 2019-04-28 NOTE — CHART NOTE - NSCHARTNOTEFT_GEN_A_CORE
79 yo F with PMHx of essential HTN, DM2, DLD, moderate persistent asthma, hx of colorectal carcinoma s/p colectomy, hx of recurrent L nephrolithiasis (2006, 2008, with admission in 2014 for urosepsis s/p L ureteral stent for pyonephrosis) presents to the ED For progressive SOB x 2 days. As per patient's granddaughter, she had been dealing with a cold for several pasts, but became more short of breath. Patient denies prior hospitalization or intubation. Her shortness of breath was not improved with prescribed inhaler (Advair 250/50); +productive cough of clear sputum and subjective fever last night. She denies chills, chest pain, abdominal pain, N/V/D, numbness, rash, dysuria. Normally, she uses her inhaler 1-2 times per day with 2 nighttime awakenings in a month. She denies any sick contacts as she only lives with her granddaughter. No recent travel. She has 1 dog who she had for 11 years, small Jose-Fred-Rat Terrier. She ambulates with a walker and cane.     Upon admission, she received 2 L LR, 2 g Mg, Solumedrol 125 x 1, nebs x 3. She was unable to be weaned off BiPAP so she is to be admitted to ICU for close observation.  In ICU patient was found to be positive for Entero/Rhinovirus. Pt was treated with solumedrol and nebs. Pt has been weaned off of supplemental oxygen and has been saturating well on room air. Pt has improved expiratory wheezing. Pt medically stable for floors.    GEN: No acute distress  LUNGS: expiratory wheezes b/l improved  HEART: Regular  ABD: Soft, non-tender, non-distended.  EXT: skin intact  NEURO: AAOX3      #) Asthma exacerbation 2/2 Rhinovirus   - CXR (04/26): Negative  - Duonebs Q4H + PRN, Solumedrol 40mg BID  - on room air, supplemental o2 prn  - c/w Peak flow measurement  - Follow-up [facility] Duplex LE     # Lactic acidosis likely type B  -ABG does not show hypoxemia, likely type B lactic acidosis from albuterol  - repeat lactate improved.     #) Essential hypertension  - Holding home dose Toprol  mg QD because BP on lower side.  - Continue with Losartan 25 mg QD, Nifedipine ER 60 mg QD, HCTZ 25 mg QD  - Echo EF 70%  -stable    #) DM2  - FS AC+HS  - Maintain FS at 140-180    #) DLD  - Continue with Simvastatin 20 mg QD    #) Hx of recurrent L nephrolithiasis   - Follow-up as outpatient     #) Anxiety  - Continue with Sertraline 50 mg QD    #) History of CRC  - Last Colonoscopy in 2017, next in 2020 due to fair prep.     #) GI ppx: PO Protonix 40 mg QD  #) DVT ppx: Lovenox 40 mg QD    #) Diet: Carbohydrate consistent, low sodium, aspiration precautions, HOB 45  #) Activity: OOBTC 79 yo F with PMHx of essential HTN, DM2, DLD, moderate persistent asthma, hx of colorectal carcinoma s/p colectomy, hx of recurrent L nephrolithiasis (2006, 2008, with admission in 2014 for urosepsis s/p L ureteral stent for pyonephrosis) presents to the ED For progressive SOB x 2 days. As per patient's granddaughter, she had been dealing with a cold for several pasts, but became more short of breath. Patient denies prior hospitalization or intubation. Her shortness of breath was not improved with prescribed inhaler (Advair 250/50); +productive cough of clear sputum and subjective fever last night. She denies chills, chest pain, abdominal pain, N/V/D, numbness, rash, dysuria. Normally, she uses her inhaler 1-2 times per day with 2 nighttime awakenings in a month. She denies any sick contacts as she only lives with her granddaughter. No recent travel. She has 1 dog who she had for 11 years, small Jose-Fred-Rat Terrier. She ambulates with a walker and cane.     Upon admission, she received 2 L LR, 2 g Mg, Solumedrol 125 x 1, nebs x 3. She was unable to be weaned off BiPAP so she is to be admitted to ICU for close observation.  In ICU patient was found to be positive for Entero/Rhinovirus. Pt was treated with solumedrol and nebs. Pt has been weaned off of supplemental oxygen and has been saturating well on room air. Pt has improved expiratory wheezing. Pt medically stable for floors.    GEN: No acute distress  LUNGS: expiratory wheezes b/l improved  HEART: Regular  ABD: Soft, non-tender, non-distended.  EXT: skin intact  NEURO: AAOX3      #) Asthma exacerbation 2/2 Rhinovirus   - CXR (04/26): Negative  - Duonebs Q4H + PRN, Solumedrol 40mg BID  - on room air, supplemental o2 prn  - c/w Peak flow measurement  - Follow-up [facility] Duplex LE     # Lactic acidosis likely type B  -ABG does not show hypoxemia, likely type B lactic acidosis from albuterol  - repeat lactate improved.     #) Essential hypertension  - Holding home dose Toprol  mg QD because BP on lower side.  - Continue with Losartan 25 mg QD, Nifedipine ER 60 mg QD, HCTZ 25 mg QD  - Echo EF 70%  -stable    #) DM2  - FS AC+HS  - Maintain FS at 140-180    #) DLD  - Continue with Simvastatin 20 mg QD    #) Hx of recurrent L nephrolithiasis   - Follow-up as outpatient     #) Anxiety  - Continue with Sertraline 50 mg QD    #) History of CRC  - Last Colonoscopy in 2017, next in 2020 due to fair prep.     #) GI ppx: PO Protonix 40 mg QD  #) DVT ppx: Lovenox 40 mg QD    #) Diet: Carbohydrate consistent, low sodium, aspiration precautions, HOB 45  #) Activity: OOBTC    SIGN OUT GIVEN TO DR Ruiz AT 4/28/19 6:15PM

## 2019-04-28 NOTE — PROGRESS NOTE ADULT - SUBJECTIVE AND OBJECTIVE BOX
Patient is a 78y old  Female who presents with a chief complaint of Shortness of breath (27 Apr 2019 08:21)      Over Night Events:  Patient seen and examined feel good still has high FS on lantus   on Ra pox=10     ROS:  See HPI    PHYSICAL EXAM    ICU Vital Signs Last 24 Hrs  T(C): 36.7 (28 Apr 2019 06:00), Max: 37.1 (27 Apr 2019 20:00)  T(F): 98 (28 Apr 2019 06:00), Max: 98.8 (27 Apr 2019 20:00)  HR: 92 (28 Apr 2019 06:00) (86 - 114)  BP: 130/67 (28 Apr 2019 06:00) (100/51 - 133/63)  BP(mean): 88 (28 Apr 2019 06:00) (65 - 88)  ABP: --  ABP(mean): --  RR: 27 (28 Apr 2019 06:00) (20 - 28)  SpO2: 99% (28 Apr 2019 06:00) (96% - 100%)      General: Aox3  HEENT:         jennifer       Lymph Nodes: NO cervical LN   Lungs: Bilateral BS  Cardiovascular: Regular   Abdomen: Soft, Positive BS  Extremities: No clubbing   Skin: warm   Neurological: no focal   Musculoskeletal: move all ext     I&O's Detail    27 Apr 2019 07:01  -  28 Apr 2019 07:00  --------------------------------------------------------  IN:    Oral Fluid: 960 mL  Total IN: 960 mL    OUT:    Voided: 1500 mL  Total OUT: 1500 mL    Total NET: -540 mL          LABS:                          9.8    21.45 )-----------( 360      ( 28 Apr 2019 05:13 )             30.6         28 Apr 2019 05:13    137    |  103    |  39     ----------------------------<  360    4.5     |  20     |  1.1      Ca    8.8        28 Apr 2019 05:13  Mg     2.2       28 Apr 2019 05:13    TPro  6.2    /  Alb  3.7    /  TBili  <0.2   /  DBili  x      /  AST  10     /  ALT  8      /  AlkPhos  59     28 Apr 2019 05:13  Amylase x     lipase x                                                                                            Lactate, Blood: 2.5 mmol/L (04-27-19 @ 06:36)  Lactate, Blood: 3.4 mmol/L (04-26-19 @ 20:21)    CARDIAC MARKERS ( 27 Apr 2019 06:36 )  x     / <0.01 ng/mL / x     / x     / x      CARDIAC MARKERS ( 26 Apr 2019 20:21 )  x     / <0.01 ng/mL / x     / x     / x      CARDIAC MARKERS ( 26 Apr 2019 13:44 )  x     / <0.01 ng/mL / x     / x     / x                                                                                                                                             ABG - ( 26 Apr 2019 15:50 )  pH, Arterial: 7.28  pH, Blood: x     /  pCO2: 40    /  pO2: 98    / HCO3: 19    / Base Excess: -7.7  /  SaO2: 97                  MEDICATIONS  (STANDING):  ALBUTerol    90 MICROgram(s) HFA Inhaler 1 Puff(s) Inhalation every 4 hours  ALBUTerol/ipratropium for Nebulization 3 milliLiter(s) Nebulizer every 4 hours  buDESOnide 160 MICROgram(s)/formoterol 4.5 MICROgram(s) Inhaler 2 Puff(s) Inhalation two times a day  chlorhexidine 4% Liquid 1 Application(s) Topical <User Schedule>  dextrose 5%. 1000 milliLiter(s) (50 mL/Hr) IV Continuous <Continuous>  dextrose 50% Injectable 12.5 Gram(s) IV Push once  dextrose 50% Injectable 25 Gram(s) IV Push once  dextrose 50% Injectable 25 Gram(s) IV Push once  enoxaparin Injectable 40 milliGRAM(s) SubCutaneous daily  hydrochlorothiazide 25 milliGRAM(s) Oral daily  insulin glargine Injectable (LANTUS) 15 Unit(s) SubCutaneous at bedtime  insulin lispro Injectable (HumaLOG) 5 Unit(s) SubCutaneous three times a day before meals  losartan 25 milliGRAM(s) Oral daily  methylPREDNISolone sodium succinate Injectable 60 milliGRAM(s) IV Push every 12 hours  NIFEdipine XL 60 milliGRAM(s) Oral daily  pantoprazole    Tablet 40 milliGRAM(s) Oral before breakfast  sertraline 50 milliGRAM(s) Oral daily  simvastatin 20 milliGRAM(s) Oral at bedtime  tiotropium 18 MICROgram(s) Capsule 1 Capsule(s) Inhalation daily    MEDICATIONS  (PRN):  ALBUTerol/ipratropium for Nebulization 3 milliLiter(s) Nebulizer every 4 hours PRN Shortness of Breath and/or Wheezing  dextrose 40% Gel 15 Gram(s) Oral once PRN Blood Glucose LESS THAN 70 milliGRAM(s)/deciliter  glucagon  Injectable 1 milliGRAM(s) IntraMuscular once PRN Glucose LESS THAN 70 milligrams/deciliter          Xrays:  TLC:  OG:  ET tube:                                                                                       ECHO:  [facility] ICU:

## 2019-04-29 ENCOUNTER — TRANSCRIPTION ENCOUNTER (OUTPATIENT)
Age: 79
End: 2019-04-29

## 2019-04-29 ENCOUNTER — RX RENEWAL (OUTPATIENT)
Age: 79
End: 2019-04-29

## 2019-04-29 LAB
ALBUMIN SERPL ELPH-MCNC: 3.9 G/DL — SIGNIFICANT CHANGE UP (ref 3.5–5.2)
ALP SERPL-CCNC: 67 U/L — SIGNIFICANT CHANGE UP (ref 30–115)
ALT FLD-CCNC: 10 U/L — SIGNIFICANT CHANGE UP (ref 0–41)
ANION GAP SERPL CALC-SCNC: 15 MMOL/L — HIGH (ref 7–14)
AST SERPL-CCNC: 12 U/L — SIGNIFICANT CHANGE UP (ref 0–41)
BASOPHILS # BLD AUTO: 0.03 K/UL — SIGNIFICANT CHANGE UP (ref 0–0.2)
BASOPHILS NFR BLD AUTO: 0.2 % — SIGNIFICANT CHANGE UP (ref 0–1)
BILIRUB SERPL-MCNC: <0.2 MG/DL — SIGNIFICANT CHANGE UP (ref 0.2–1.2)
BUN SERPL-MCNC: 39 MG/DL — HIGH (ref 10–20)
CALCIUM SERPL-MCNC: 9.5 MG/DL — SIGNIFICANT CHANGE UP (ref 8.5–10.1)
CHLORIDE SERPL-SCNC: 102 MMOL/L — SIGNIFICANT CHANGE UP (ref 98–110)
CO2 SERPL-SCNC: 24 MMOL/L — SIGNIFICANT CHANGE UP (ref 17–32)
CREAT SERPL-MCNC: 1.1 MG/DL — SIGNIFICANT CHANGE UP (ref 0.7–1.5)
EOSINOPHIL # BLD AUTO: 0.02 K/UL — SIGNIFICANT CHANGE UP (ref 0–0.7)
EOSINOPHIL NFR BLD AUTO: 0.1 % — SIGNIFICANT CHANGE UP (ref 0–8)
ESTIMATED AVERAGE GLUCOSE: 200 MG/DL — HIGH (ref 68–114)
GLUCOSE BLDC GLUCOMTR-MCNC: 134 MG/DL — HIGH (ref 70–99)
GLUCOSE BLDC GLUCOMTR-MCNC: 174 MG/DL — HIGH (ref 70–99)
GLUCOSE BLDC GLUCOMTR-MCNC: 217 MG/DL — HIGH (ref 70–99)
GLUCOSE BLDC GLUCOMTR-MCNC: 229 MG/DL — HIGH (ref 70–99)
GLUCOSE SERPL-MCNC: 180 MG/DL — HIGH (ref 70–99)
HBA1C BLD-MCNC: 8.6 % — HIGH (ref 4–5.6)
HCT VFR BLD CALC: 35.2 % — LOW (ref 37–47)
HGB BLD-MCNC: 11.2 G/DL — LOW (ref 12–16)
IMM GRANULOCYTES NFR BLD AUTO: 1.5 % — HIGH (ref 0.1–0.3)
LYMPHOCYTES # BLD AUTO: 11.8 % — LOW (ref 20.5–51.1)
LYMPHOCYTES # BLD AUTO: 2.26 K/UL — SIGNIFICANT CHANGE UP (ref 1.2–3.4)
MAGNESIUM SERPL-MCNC: 2.2 MG/DL — SIGNIFICANT CHANGE UP (ref 1.8–2.4)
MCHC RBC-ENTMCNC: 27.7 PG — SIGNIFICANT CHANGE UP (ref 27–31)
MCHC RBC-ENTMCNC: 31.8 G/DL — LOW (ref 32–37)
MCV RBC AUTO: 86.9 FL — SIGNIFICANT CHANGE UP (ref 81–99)
MONOCYTES # BLD AUTO: 0.94 K/UL — HIGH (ref 0.1–0.6)
MONOCYTES NFR BLD AUTO: 4.9 % — SIGNIFICANT CHANGE UP (ref 1.7–9.3)
NEUTROPHILS # BLD AUTO: 15.65 K/UL — HIGH (ref 1.4–6.5)
NEUTROPHILS NFR BLD AUTO: 81.5 % — HIGH (ref 42.2–75.2)
NRBC # BLD: 0 /100 WBCS — SIGNIFICANT CHANGE UP (ref 0–0)
PLATELET # BLD AUTO: 437 K/UL — HIGH (ref 130–400)
POTASSIUM SERPL-MCNC: 4.2 MMOL/L — SIGNIFICANT CHANGE UP (ref 3.5–5)
POTASSIUM SERPL-SCNC: 4.2 MMOL/L — SIGNIFICANT CHANGE UP (ref 3.5–5)
PROT SERPL-MCNC: 7 G/DL — SIGNIFICANT CHANGE UP (ref 6–8)
RBC # BLD: 4.05 M/UL — LOW (ref 4.2–5.4)
RBC # FLD: 13.4 % — SIGNIFICANT CHANGE UP (ref 11.5–14.5)
SODIUM SERPL-SCNC: 141 MMOL/L — SIGNIFICANT CHANGE UP (ref 135–146)
WBC # BLD: 19.19 K/UL — HIGH (ref 4.8–10.8)
WBC # FLD AUTO: 19.19 K/UL — HIGH (ref 4.8–10.8)

## 2019-04-29 RX ORDER — METOPROLOL TARTRATE 50 MG
50 TABLET ORAL DAILY
Qty: 0 | Refills: 0 | Status: DISCONTINUED | OUTPATIENT
Start: 2019-04-29 | End: 2019-05-01

## 2019-04-29 RX ORDER — BENZOCAINE AND MENTHOL 5; 1 G/100ML; G/100ML
1 LIQUID ORAL
Qty: 0 | Refills: 0 | Status: DISCONTINUED | OUTPATIENT
Start: 2019-04-29 | End: 2019-05-02

## 2019-04-29 RX ADMIN — Medication 2: at 07:33

## 2019-04-29 RX ADMIN — Medication 25 MILLIGRAM(S): at 06:05

## 2019-04-29 RX ADMIN — Medication 3 MILLILITER(S): at 16:08

## 2019-04-29 RX ADMIN — BUDESONIDE AND FORMOTEROL FUMARATE DIHYDRATE 2 PUFF(S): 160; 4.5 AEROSOL RESPIRATORY (INHALATION) at 20:52

## 2019-04-29 RX ADMIN — Medication 8 UNIT(S): at 12:33

## 2019-04-29 RX ADMIN — Medication 8 UNIT(S): at 17:34

## 2019-04-29 RX ADMIN — Medication 3 MILLILITER(S): at 12:24

## 2019-04-29 RX ADMIN — Medication 4: at 17:33

## 2019-04-29 RX ADMIN — Medication 3 MILLILITER(S): at 07:45

## 2019-04-29 RX ADMIN — SIMVASTATIN 20 MILLIGRAM(S): 20 TABLET, FILM COATED ORAL at 20:53

## 2019-04-29 RX ADMIN — SERTRALINE 50 MILLIGRAM(S): 25 TABLET, FILM COATED ORAL at 11:52

## 2019-04-29 RX ADMIN — Medication 4: at 12:32

## 2019-04-29 RX ADMIN — ENOXAPARIN SODIUM 40 MILLIGRAM(S): 100 INJECTION SUBCUTANEOUS at 11:51

## 2019-04-29 RX ADMIN — INSULIN GLARGINE 24 UNIT(S): 100 INJECTION, SOLUTION SUBCUTANEOUS at 21:23

## 2019-04-29 RX ADMIN — Medication 40 MILLIGRAM(S): at 06:05

## 2019-04-29 RX ADMIN — LOSARTAN POTASSIUM 25 MILLIGRAM(S): 100 TABLET, FILM COATED ORAL at 06:05

## 2019-04-29 RX ADMIN — Medication 8 UNIT(S): at 07:33

## 2019-04-29 RX ADMIN — BUDESONIDE AND FORMOTEROL FUMARATE DIHYDRATE 2 PUFF(S): 160; 4.5 AEROSOL RESPIRATORY (INHALATION) at 11:51

## 2019-04-29 RX ADMIN — PANTOPRAZOLE SODIUM 40 MILLIGRAM(S): 20 TABLET, DELAYED RELEASE ORAL at 07:32

## 2019-04-29 RX ADMIN — Medication 50 MILLIGRAM(S): at 12:58

## 2019-04-29 RX ADMIN — Medication 60 MILLIGRAM(S): at 06:05

## 2019-04-29 RX ADMIN — Medication 3 MILLILITER(S): at 19:20

## 2019-04-29 NOTE — PROGRESS NOTE ADULT - ASSESSMENT
79 yo F with PMHx of essential HTN, DM2, DLD, moderate persistent asthma, hx of colorectal carcinoma s/p colectomy, hx of recurrent L nephrolithiasis (2006, 2008, with admission in 2014 for urosepsis s/p L ureteral stent for pyonephrosis) presents to the ED For progressive SOB x 2 days. 77 yo F with PMHx of essential HTN, DM2, DLD, moderate persistent asthma, hx of colorectal carcinoma s/p colectomy, hx of recurrent L nephrolithiasis (2006, 2008, with admission in 2014 for urosepsis s/p L ureteral stent for pyonephrosis) presents to the ED For progressive SOB x 2 days. Pt was admitted to ICU with diagnosis of Acute hypoxic respiratory failure sec to asthma exacerbation sec to rhino virus infection.  Pt was transfered to CEU last night    #Acute hypoxic respiratory failure sec to asthma exacerbation resolved  # Asthma exacerbation 2/2 Rhinovirus   - Xray Chest 1 View- PORTABLE-Routine (04.28.19 @ 06:39) >No radiographic evidence of acute cardiopulmonary disease.  - C/w albuterol, spiriva, budesonide  - Discontinue solumedrol , start Prednisone  - oxygen sat 96 on RA  -  Peak flow measurement    # Lactic acidosis likely type B- resolved    #Hypertension  - c/w losartan, nifedipine, HCTZ   - restart toprol xl 50 mg   - Echo EF 70%    # DM type 2  - monitor FS  -c/w lantus, lispro    # H/o dyslipidemia  - c/w simvastatin     # H/o  Anxiety  - c/w sertraline     # H/o colorectal cancer s/p colectomy  - Last Colonoscopy in 2017, next  due in 2020     #GI/ DVT prophylaxis    # Full code    #Pending: Anticipated for discharge in AM  # Discussed with patient  #Discharge: home

## 2019-04-29 NOTE — DISCHARGE NOTE NURSING/CASE MANAGEMENT/SOCIAL WORK - CAREGIVER PHONE NUMBER
[place]      Last Written Prescription Date: 8/26/16  Last Quantity: 90, # refills: 3  Last Office Visit with [facility], [facility] or [facility] prescribing provider: 9/26/16       Creatinine   Date Value Ref Range Status   10/06/2016 0.96 0.52 - 1.04 mg/dL Final     Lab Results   Component Value Date    AST 17 10/06/2016     Lab Results   Component Value Date    ALT 24 10/06/2016     BP Readings from Last 3 Encounters:   02/06/17 146/82   10/06/16 111/67   09/28/16 138/74      family number in chart

## 2019-04-29 NOTE — PROGRESS NOTE ADULT - ASSESSMENT
77 yo F with PMHx of essential HTN, DM2, DLD, moderate persistent asthma, hx of colorectal carcinoma s/p colectomy, hx of recurrent L nephrolithiasis (2006, 2008, with admission in 2014 for urosepsis s/p L ureteral stent for pyonephrosis) presents to the ED For progressive SOB x 2 days. Pt was admitted to ICU with diagnosis of Acute hypoxic respiratory failure sec to asthma exacerbation sec to rhino virus infection.  Pt was transfered to CEU last night    #Acute hypoxic respiratory failure sec to asthma exacerbation resolved  # Asthma exacerbation 2/2 Rhinovirus   - Xray Chest 1 View- PORTABLE-Routine (04.28.19 @ 06:39) >No radiographic evidence of acute cardiopulmonary disease.  - C/w albuterol, spiriva, budesonide  - Discontinue solumedrol , start Prednisone  - oxygen sat 96 on RA  -  Peak flow measurement    # Lactic acidosis likely type B- resolved    #Hypertension  - c/w losartan, nifedipine, HCTZ   - restart toprol xl 50 mg   - Echo EF 70%    # DM type 2  - monitor FS  -c/w lantus, lispro    # H/o dyslipidemia  - c/w simvastatin     # H/o  Anxiety  - c/w sertraline     # H/o colorectal cancer s/p colectomy  - Last Colonoscopy in 2017, next  due in 2020     #GI/ DVT prophylaxis    # Full code    #Pending: Anticipated for discharge in AM  # Discussed with patient  #Discharge: home

## 2019-04-29 NOTE — PROGRESS NOTE ADULT - SUBJECTIVE AND OBJECTIVE BOX
Patient is a 78y old  Female who presents with a chief complaint of Shortness of breath (2019 08:48)    Patient was seen and examined.  Denies chest pain , sob.  Denies any other complaints.  All systems reviewed positive history as mentioned above.    PAST MEDICAL & SURGICAL HISTORY:  Left nephrolithiasis  History of colorectal cancer  DM (diabetes mellitus)  HLD (hyperlipidemia)  HTN (hypertension)  Asthma  S/P  section  S/P ureteral stent placement  S/P revision of total knee, right  S/P total knee replacement, right  H/O colectomy    Allergies  No Known Drug Allergies  Peaches (Rash)    MEDICATIONS  (STANDING):  ALBUTerol    90 MICROgram(s) HFA Inhaler 1 Puff(s) Inhalation every 4 hours  ALBUTerol/ipratropium for Nebulization 3 milliLiter(s) Nebulizer every 4 hours  buDESOnide 160 MICROgram(s)/formoterol 4.5 MICROgram(s) Inhaler 2 Puff(s) Inhalation two times a day  chlorhexidine 4% Liquid 1 Application(s) Topical <User Schedule>  dextrose 5%. 1000 milliLiter(s) (50 mL/Hr) IV Continuous <Continuous>  dextrose 50% Injectable 12.5 Gram(s) IV Push once  dextrose 50% Injectable 25 Gram(s) IV Push once  dextrose 50% Injectable 25 Gram(s) IV Push once  enoxaparin Injectable 40 milliGRAM(s) SubCutaneous daily  hydrochlorothiazide 25 milliGRAM(s) Oral daily  insulin glargine Injectable (LANTUS) 24 Unit(s) SubCutaneous at bedtime  insulin lispro (HumaLOG) corrective regimen sliding scale   SubCutaneous three times a day before meals  insulin lispro Injectable (HumaLOG) 8 Unit(s) SubCutaneous three times a day before meals  losartan 25 milliGRAM(s) Oral daily  metoprolol succinate ER 50 milliGRAM(s) Oral daily  NIFEdipine XL 60 milliGRAM(s) Oral daily  pantoprazole    Tablet 40 milliGRAM(s) Oral before breakfast  predniSONE   Tablet 40 milliGRAM(s) Oral daily  sertraline 50 milliGRAM(s) Oral daily  simvastatin 20 milliGRAM(s) Oral at bedtime  tiotropium 18 MICROgram(s) Capsule 1 Capsule(s) Inhalation daily    MEDICATIONS  (PRN):  ALBUTerol/ipratropium for Nebulization 3 milliLiter(s) Nebulizer every 4 hours PRN Shortness of Breath and/or Wheezing  benzocaine 15 mG/menthol 3.6 mG (Sugar-Free) Lozenge 1 Lozenge Oral four times a day PRN Sore Throat  dextrose 40% Gel 15 Gram(s) Oral once PRN Blood Glucose LESS THAN 70 milliGRAM(s)/deciliter  glucagon  Injectable 1 milliGRAM(s) IntraMuscular once PRN Glucose LESS THAN 70 milligrams/deciliter    Vital Signs Last 24 Hrs  T(C): 36.5  T(F): 97.7  HR: 82  BP: 144/71  BP(mean): 94  RR: 20  SpO2: 96%  O/E:  Awake, alert, not in distress.  HEENT: atraumatic, EOMI.  Chest: few wheezes b/l  CVS: SIS2 +, no murmur.  P/A: Soft, BS+  CNS: non focal.  Ext: no edema feet.  Skin: no rash, no ulcers.  All systems reviewed positive findings as above.      POCT Blood Glucose.: 229 mg/dL (2019 11:38)  POCT Blood Glucose.: 174 mg/dL (2019 07:26)  POCT Blood Glucose.: 228 mg/dL (2019 21:26)  POCT Blood Glucose.: 248 mg/dL (2019 17:17)                          11.2<L>  19.19<H> )-----------( 437<H>    ( 2019 06:24 )             35.2<L>                        9.8<L>  21.45<H> )-----------( 360      ( 2019 05:13 )             30.6<L>        141  |  102  |  39<H>  ----------------------------<  180<H>  4.2   |  24  |  1.1      137  |  103  |  39<H>  ----------------------------<  360<H>  4.5   |  20  |  1.1    Ca    9.5      2019 06:24  Ca    8.8      2019 05:13  Mg     2.2         TPro  7.0  /  Alb  3.9  /  TBili  <0.2  /  DBili  x   /  AST  12  /  ALT  10  /  AlkPhos  67  04-29  TPro  6.2  /  Alb  3.7  /  TBili  <0.2  /  DBili  x   /  AST  10  /  ALT  8   /  AlkPhos  59  [id number]

## 2019-04-29 NOTE — DISCHARGE NOTE NURSING/CASE MANAGEMENT/SOCIAL WORK - NSDCDPATPORTLINK_GEN_ALL_CORE
You can access the [facility] Patient Portal, offered by [facility], by registering with the following website: [email or web address]

## 2019-04-29 NOTE — PROGRESS NOTE ADULT - SUBJECTIVE AND OBJECTIVE BOX
78y old  Female who presents with a chief complaint of Shortness of breath (2019 08:48)    Patient was seen and examined.  Per staff, pt ambulated on RA.  Denies chest pain , sob, abd pain, dysuria, diarrhea/constipation.  Denies any other complaints.  All systems reviewed positive history as mentioned above.    PAST MEDICAL & SURGICAL HISTORY:  Left nephrolithiasis  History of colorectal cancer  DM (diabetes mellitus)  HLD (hyperlipidemia)  HTN (hypertension)  Asthma  S/P  section  S/P ureteral stent placement  S/P revision of total knee, right  S/P total knee replacement, right  H/O colectomy    Allergies  No Known Drug Allergies  Peaches (Rash)    MEDICATIONS  (STANDING):  ALBUTerol    90 MICROgram(s) HFA Inhaler 1 Puff(s) Inhalation every 4 hours  ALBUTerol/ipratropium for Nebulization 3 milliLiter(s) Nebulizer every 4 hours  buDESOnide 160 MICROgram(s)/formoterol 4.5 MICROgram(s) Inhaler 2 Puff(s) Inhalation two times a day  chlorhexidine 4% Liquid 1 Application(s) Topical <User Schedule>  dextrose 5%. 1000 milliLiter(s) (50 mL/Hr) IV Continuous <Continuous>  dextrose 50% Injectable 12.5 Gram(s) IV Push once  dextrose 50% Injectable 25 Gram(s) IV Push once  dextrose 50% Injectable 25 Gram(s) IV Push once  enoxaparin Injectable 40 milliGRAM(s) SubCutaneous daily  hydrochlorothiazide 25 milliGRAM(s) Oral daily  insulin glargine Injectable (LANTUS) 24 Unit(s) SubCutaneous at bedtime  insulin lispro (HumaLOG) corrective regimen sliding scale   SubCutaneous three times a day before meals  insulin lispro Injectable (HumaLOG) 8 Unit(s) SubCutaneous three times a day before meals  losartan 25 milliGRAM(s) Oral daily  metoprolol succinate ER 50 milliGRAM(s) Oral daily  NIFEdipine XL 60 milliGRAM(s) Oral daily  pantoprazole    Tablet 40 milliGRAM(s) Oral before breakfast  predniSONE   Tablet 40 milliGRAM(s) Oral daily  sertraline 50 milliGRAM(s) Oral daily  simvastatin 20 milliGRAM(s) Oral at bedtime  tiotropium 18 MICROgram(s) Capsule 1 Capsule(s) Inhalation daily    MEDICATIONS  (PRN):  ALBUTerol/ipratropium for Nebulization 3 milliLiter(s) Nebulizer every 4 hours PRN Shortness of Breath and/or Wheezing  benzocaine 15 mG/menthol 3.6 mG (Sugar-Free) Lozenge 1 Lozenge Oral four times a day PRN Sore Throat  dextrose 40% Gel 15 Gram(s) Oral once PRN Blood Glucose LESS THAN 70 milliGRAM(s)/deciliter  glucagon  Injectable 1 milliGRAM(s) IntraMuscular once PRN Glucose LESS THAN 70 milligrams/deciliter    Vital Signs Last 24 Hrs  T(C): 36.5  T(F): 97.7  HR: 82  BP: 144/71  BP(mean): 94  RR: 20  SpO2: 96%    O/E:  Awake, alert, not in distress.  HEENT: atraumatic, EOMI.  Chest: few wheezes b/l  CVS: SIS2 +, no murmur.  P/A: Soft, BS+, non tender  CNS: non focal.  Ext: no edema feet.  Skin: no rash, no ulcers.  All systems reviewed positive findings as above.      POCT Blood Glucose.: 229 mg/dL (2019 11:38)  POCT Blood Glucose.: 174 mg/dL (2019 07:26)  POCT Blood Glucose.: 228 mg/dL (2019 21:26)  POCT Blood Glucose.: 248 mg/dL (2019 17:17)                          11.2<L>  19.19<H> )-----------( 437<H>    ( 2019 06:24 )             35.2<L>                        9.8<L>  21.45<H> )-----------( 360      ( 2019 05:13 )             30.6<L>        141  |  102  |  39<H>  ----------------------------<  180<H>  4.2   |  24  |  1.1      137  |  103  |  39<H>  ----------------------------<  360<H>  4.5   |  20  |  1.1    Ca    9.5      2019 06:24  Ca    8.8      2019 05:13  Mg     2.2         TPro  7.0  /  Alb  3.9  /  TBili  <0.2  /  DBili  x   /  AST  12  /  ALT  10  /  AlkPhos  67    TPro  6.2  /  Alb  3.7  /  TBili  <0.2  /  DBili  x   /  AST  10  /  ALT  8   /  AlkPhos  59

## 2019-04-30 LAB
ANION GAP SERPL CALC-SCNC: 14 MMOL/L — SIGNIFICANT CHANGE UP (ref 7–14)
BUN SERPL-MCNC: 37 MG/DL — HIGH (ref 10–20)
CALCIUM SERPL-MCNC: 9.2 MG/DL — SIGNIFICANT CHANGE UP (ref 8.5–10.1)
CHLORIDE SERPL-SCNC: 102 MMOL/L — SIGNIFICANT CHANGE UP (ref 98–110)
CO2 SERPL-SCNC: 27 MMOL/L — SIGNIFICANT CHANGE UP (ref 17–32)
CREAT SERPL-MCNC: 1.2 MG/DL — SIGNIFICANT CHANGE UP (ref 0.7–1.5)
GLUCOSE BLDC GLUCOMTR-MCNC: 216 MG/DL — HIGH (ref 70–99)
GLUCOSE BLDC GLUCOMTR-MCNC: 354 MG/DL — HIGH (ref 70–99)
GLUCOSE BLDC GLUCOMTR-MCNC: 71 MG/DL — SIGNIFICANT CHANGE UP (ref 70–99)
GLUCOSE BLDC GLUCOMTR-MCNC: 94 MG/DL — SIGNIFICANT CHANGE UP (ref 70–99)
GLUCOSE SERPL-MCNC: 111 MG/DL — HIGH (ref 70–99)
HCT VFR BLD CALC: 35.7 % — LOW (ref 37–47)
HGB BLD-MCNC: 11.3 G/DL — LOW (ref 12–16)
MAGNESIUM SERPL-MCNC: 2 MG/DL — SIGNIFICANT CHANGE UP (ref 1.8–2.4)
MCHC RBC-ENTMCNC: 27.8 PG — SIGNIFICANT CHANGE UP (ref 27–31)
MCHC RBC-ENTMCNC: 31.7 G/DL — LOW (ref 32–37)
MCV RBC AUTO: 87.9 FL — SIGNIFICANT CHANGE UP (ref 81–99)
NRBC # BLD: 0 /100 WBCS — SIGNIFICANT CHANGE UP (ref 0–0)
PLATELET # BLD AUTO: 415 K/UL — HIGH (ref 130–400)
POTASSIUM SERPL-MCNC: 5 MMOL/L — SIGNIFICANT CHANGE UP (ref 3.5–5)
POTASSIUM SERPL-SCNC: 5 MMOL/L — SIGNIFICANT CHANGE UP (ref 3.5–5)
RBC # BLD: 4.06 M/UL — LOW (ref 4.2–5.4)
RBC # FLD: 13.4 % — SIGNIFICANT CHANGE UP (ref 11.5–14.5)
SODIUM SERPL-SCNC: 143 MMOL/L — SIGNIFICANT CHANGE UP (ref 135–146)
WBC # BLD: 15.88 K/UL — HIGH (ref 4.8–10.8)
WBC # FLD AUTO: 15.88 K/UL — HIGH (ref 4.8–10.8)

## 2019-04-30 RX ADMIN — LOSARTAN POTASSIUM 25 MILLIGRAM(S): 100 TABLET, FILM COATED ORAL at 06:10

## 2019-04-30 RX ADMIN — Medication 4: at 16:59

## 2019-04-30 RX ADMIN — Medication 8 UNIT(S): at 11:37

## 2019-04-30 RX ADMIN — ENOXAPARIN SODIUM 40 MILLIGRAM(S): 100 INJECTION SUBCUTANEOUS at 11:12

## 2019-04-30 RX ADMIN — Medication 50 MILLIGRAM(S): at 06:11

## 2019-04-30 RX ADMIN — Medication 3 MILLILITER(S): at 09:13

## 2019-04-30 RX ADMIN — BUDESONIDE AND FORMOTEROL FUMARATE DIHYDRATE 2 PUFF(S): 160; 4.5 AEROSOL RESPIRATORY (INHALATION) at 07:54

## 2019-04-30 RX ADMIN — Medication 10: at 11:37

## 2019-04-30 RX ADMIN — Medication 3 MILLILITER(S): at 21:10

## 2019-04-30 RX ADMIN — Medication 60 MILLIGRAM(S): at 06:10

## 2019-04-30 RX ADMIN — Medication 3 MILLILITER(S): at 15:40

## 2019-04-30 RX ADMIN — PANTOPRAZOLE SODIUM 40 MILLIGRAM(S): 20 TABLET, DELAYED RELEASE ORAL at 06:10

## 2019-04-30 RX ADMIN — BUDESONIDE AND FORMOTEROL FUMARATE DIHYDRATE 2 PUFF(S): 160; 4.5 AEROSOL RESPIRATORY (INHALATION) at 21:23

## 2019-04-30 RX ADMIN — Medication 25 MILLIGRAM(S): at 06:10

## 2019-04-30 RX ADMIN — SERTRALINE 50 MILLIGRAM(S): 25 TABLET, FILM COATED ORAL at 11:12

## 2019-04-30 RX ADMIN — Medication 3 MILLILITER(S): at 11:59

## 2019-04-30 RX ADMIN — Medication 40 MILLIGRAM(S): at 06:11

## 2019-04-30 RX ADMIN — BENZOCAINE AND MENTHOL 1 LOZENGE: 5; 1 LIQUID ORAL at 08:05

## 2019-04-30 RX ADMIN — SIMVASTATIN 20 MILLIGRAM(S): 20 TABLET, FILM COATED ORAL at 21:23

## 2019-04-30 RX ADMIN — Medication 8 UNIT(S): at 17:00

## 2019-04-30 NOTE — PROGRESS NOTE ADULT - ASSESSMENT
79 yo F with PMHx of essential HTN, DM2, DLD, moderate persistent asthma, hx of colorectal carcinoma s/p colectomy, hx of recurrent L nephrolithiasis (2006, 2008, with admission in 2014 for urosepsis s/p L ureteral stent for pyonephrosis) presents to the ED For progressive SOB x 2 days. Pt was admitted to ICU with diagnosis of Acute hypoxic respiratory failure sec to asthma exacerbation sec to rhino virus infection.  Pt was transfered to CEU last night    #Acute hypoxic respiratory failure sec to asthma exacerbation resolved  # Asthma exacerbation 2/2 Rhinovirus   - Xray Chest 1 View- PORTABLE-Routine (04.28.19 @ 06:39) >No radiographic evidence of acute cardiopulmonary disease.  - C/w albuterol, spiriva, budesonide  - c/w  Prednisone  - oxygen sat 98 on RA  -  Peak flow 125  - incentive spirometry    # Lactic acidosis likely type B- resolved    #Hypertension  - c/w losartan, nifedipine, HCTZ   - c/w  toprol xl 50 mg   - Echo EF 70%    # DM type 2  - monitor FS  -c/w lantus, lispro    # H/o dyslipidemia  - c/w simvastatin     # H/o  Anxiety  - c/w sertraline     # H/o colorectal cancer s/p colectomy  - Last Colonoscopy in 2017, next  due in 2020     #GI/ DVT prophylaxis    # Full code    #Pending: awaiting clinical improvement.  # Discussed with patient  #Discharge: home when stable

## 2019-04-30 NOTE — PROGRESS NOTE ADULT - SUBJECTIVE AND OBJECTIVE BOX
78y old  Female who presents with a chief complaint of Shortness of breath (2019 08:48)    Patient was seen and examined.  Pt was Sob overnight with cough, was kept on BIPAP   C/o weakness , did not sleep well last night.  Today denies chest pain , sob on RA.   Denies any other complaints, including abd pain, dysuria, BM issues.  All systems reviewed positive history as mentioned above.    PAST MEDICAL & SURGICAL HISTORY:  Left nephrolithiasis  History of colorectal cancer  DM (diabetes mellitus)  HLD (hyperlipidemia)  HTN (hypertension)  Asthma  S/P  section  S/P ureteral stent placement  S/P revision of total knee, right  S/P total knee replacement, right  H/O colectomy    Allergies  No Known Drug Allergies  Peaches (Rash)    MEDICATIONS  (STANDING):  ALBUTerol    90 MICROgram(s) HFA Inhaler 1 Puff(s) Inhalation every 4 hours  ALBUTerol/ipratropium for Nebulization 3 milliLiter(s) Nebulizer every 4 hours  buDESOnide 160 MICROgram(s)/formoterol 4.5 MICROgram(s) Inhaler 2 Puff(s) Inhalation two times a day  chlorhexidine 4% Liquid 1 Application(s) Topical <User Schedule>  dextrose 5%. 1000 milliLiter(s) (50 mL/Hr) IV Continuous <Continuous>  dextrose 50% Injectable 12.5 Gram(s) IV Push once  dextrose 50% Injectable 25 Gram(s) IV Push once  dextrose 50% Injectable 25 Gram(s) IV Push once  enoxaparin Injectable 40 milliGRAM(s) SubCutaneous daily  hydrochlorothiazide 25 milliGRAM(s) Oral daily  insulin glargine Injectable (LANTUS) 24 Unit(s) SubCutaneous at bedtime  insulin lispro (HumaLOG) corrective regimen sliding scale   SubCutaneous three times a day before meals  insulin lispro Injectable (HumaLOG) 8 Unit(s) SubCutaneous three times a day before meals  losartan 25 milliGRAM(s) Oral daily  metoprolol succinate ER 50 milliGRAM(s) Oral daily  NIFEdipine XL 60 milliGRAM(s) Oral daily  pantoprazole    Tablet 40 milliGRAM(s) Oral before breakfast  predniSONE   Tablet 40 milliGRAM(s) Oral daily  sertraline 50 milliGRAM(s) Oral daily  simvastatin 20 milliGRAM(s) Oral at bedtime  tiotropium 18 MICROgram(s) Capsule 1 Capsule(s) Inhalation daily    MEDICATIONS  (PRN):  ALBUTerol/ipratropium for Nebulization 3 milliLiter(s) Nebulizer every 4 hours PRN Shortness of Breath and/or Wheezing  benzocaine 15 mG/menthol 3.6 mG (Sugar-Free) Lozenge 1 Lozenge Oral four times a day PRN Sore Throat  dextrose 40% Gel 15 Gram(s) Oral once PRN Blood Glucose LESS THAN 70 milliGRAM(s)/deciliter  glucagon  Injectable 1 milliGRAM(s) IntraMuscular once PRN Glucose LESS THAN 70 milligrams/deciliter    ICU Vital Signs Last 24 Hrs  T(C): 36.4 (2019 06:06), Max: 36.7 (2019 21:48)  T(F): 97.5 (2019 06:06), Max: 98 (2019 21:48)  HR: 74 (2019 06:06) (74 - 93)  BP: 134/77 (2019 21:48) (134/77 - 163/74)  RR: 20 (2019 06:06) (18 - 20)  SpO2: 98% (2019 06:06) (98% - 98%)    O/E:  Awake, alert, not in distress.  HEENT: atraumatic, EOMI.  Chest: improved wheezes b/l  CVS: SIS2 +, no murmur.  P/A: Soft, BS+, non tender  CNS: non focal.  Ext: no edema feet.  Skin: no rash, no ulcers.  All systems reviewed positive findings as above.                          11.3<L>  15.88<H> )-----------( 415<H>    ( 2019 06:12 )             35.7<L>                        11.2<L>  19.19<H> )-----------( 437<H>    ( 2019 06:24 )             35.2<L>  04    143  |  102  |  37<H>  ----------------------------<  111<H>  5.0   |  27  |  1.2  04    141  |  102  |  39<H>  ----------------------------<  180<H>  4.2   |  24  |  1.1    Ca    9.2      2019 06:12  Ca    9.5      2019 06:24  Mg     2.0         TPro  7.0  /  Alb  3.9  /  TBili  <0.2  /  DBili  x   /  AST  12  /  ALT  10  /  AlkPhos  67

## 2019-04-30 NOTE — PROGRESS NOTE ADULT - SUBJECTIVE AND OBJECTIVE BOX
Patient is a 78y old  Female who presents with a chief complaint of Shortness of breath (2019 08:48)    Patient was seen and examined.  Pt was Sob overnight, was kept on BIPAP  C/o weakness , did not sleep well last night.  Today Denies chest pain , sob.  Denies any other complaints.  All systems reviewed positive history as mentioned above.    PAST MEDICAL & SURGICAL HISTORY:  Left nephrolithiasis  History of colorectal cancer  DM (diabetes mellitus)  HLD (hyperlipidemia)  HTN (hypertension)  Asthma  S/P  section  S/P ureteral stent placement  S/P revision of total knee, right  S/P total knee replacement, right  H/O colectomy    Allergies  No Known Drug Allergies  Peaches (Rash)    MEDICATIONS  (STANDING):  ALBUTerol    90 MICROgram(s) HFA Inhaler 1 Puff(s) Inhalation every 4 hours  ALBUTerol/ipratropium for Nebulization 3 milliLiter(s) Nebulizer every 4 hours  buDESOnide 160 MICROgram(s)/formoterol 4.5 MICROgram(s) Inhaler 2 Puff(s) Inhalation two times a day  chlorhexidine 4% Liquid 1 Application(s) Topical <User Schedule>  dextrose 5%. 1000 milliLiter(s) (50 mL/Hr) IV Continuous <Continuous>  dextrose 50% Injectable 12.5 Gram(s) IV Push once  dextrose 50% Injectable 25 Gram(s) IV Push once  dextrose 50% Injectable 25 Gram(s) IV Push once  enoxaparin Injectable 40 milliGRAM(s) SubCutaneous daily  hydrochlorothiazide 25 milliGRAM(s) Oral daily  insulin glargine Injectable (LANTUS) 24 Unit(s) SubCutaneous at bedtime  insulin lispro (HumaLOG) corrective regimen sliding scale   SubCutaneous three times a day before meals  insulin lispro Injectable (HumaLOG) 8 Unit(s) SubCutaneous three times a day before meals  losartan 25 milliGRAM(s) Oral daily  metoprolol succinate ER 50 milliGRAM(s) Oral daily  NIFEdipine XL 60 milliGRAM(s) Oral daily  pantoprazole    Tablet 40 milliGRAM(s) Oral before breakfast  predniSONE   Tablet 40 milliGRAM(s) Oral daily  sertraline 50 milliGRAM(s) Oral daily  simvastatin 20 milliGRAM(s) Oral at bedtime  tiotropium 18 MICROgram(s) Capsule 1 Capsule(s) Inhalation daily    MEDICATIONS  (PRN):  ALBUTerol/ipratropium for Nebulization 3 milliLiter(s) Nebulizer every 4 hours PRN Shortness of Breath and/or Wheezing  benzocaine 15 mG/menthol 3.6 mG (Sugar-Free) Lozenge 1 Lozenge Oral four times a day PRN Sore Throat  dextrose 40% Gel 15 Gram(s) Oral once PRN Blood Glucose LESS THAN 70 milliGRAM(s)/deciliter  glucagon  Injectable 1 milliGRAM(s) IntraMuscular once PRN Glucose LESS THAN 70 milligrams/deciliter    ICU Vital Signs Last 24 Hrs  T(C): 36.4 (2019 06:06), Max: 36.7 (2019 21:48)  T(F): 97.5 (2019 06:06), Max: 98 (2019 21:48)  HR: 74 (2019 06:06) (74 - 93)  BP: 134/77 (2019 21:48) (134/77 - 163/74)  RR: 20 (2019 06:06) (18 - 20)  SpO2: 98% (2019 06:06) (98% - 98%)    O/E:  Awake, alert, not in distress.  HEENT: atraumatic, EOMI.  Chest: few wheezes b/l  CVS: SIS2 +, no murmur.  P/A: Soft, BS+  CNS: non focal.  Ext: no edema feet.  Skin: no rash, no ulcers.  All systems reviewed positive findings as above.                          11.3<L>  15.88<H> )-----------( 415<H>    ( 2019 06:12 )             35.7<L>                        11.2<L>  19.19<H> )-----------( 437<H>    ( 2019 06:24 )             35.2<L>  04    143  |  102  |  37<H>  ----------------------------<  111<H>  5.0   |  27  |  1.2  04    141  |  102  |  39<H>  ----------------------------<  180<H>  4.2   |  24  |  1.1    Ca    9.2      2019 06:12  Ca    9.5      2019 06:24  Mg     2.0         TPro  7.0  /  Alb  3.9  /  TBili  <0.2  /  DBili  x   /  AST  12  /  ALT  10  /  AlkPhos  67

## 2019-04-30 NOTE — PROGRESS NOTE ADULT - ASSESSMENT
77 yo F PMHx essential HTN, DM2, DLD, moderate persistent asthma, colorectal carcinoma s/p colectomy, recurrent L nephrolithiasis (2006, 2008, with admission in 2014 for urosepsis s/p L ureteral stent for pyonephrosis) presents to the ED For progressive SOB x 2 days. Pt was admitted to ICU with diagnosis of Acute hypoxic respiratory failure sec to asthma exacerbation sec to rhino virus infection.  Pt was transferred to CEU.    #Acute hypoxic respiratory failure sec to asthma exacerbation resolved  # Asthma exacerbation 2/2 Rhinovirus   - Xray Chest 1 View- PORTABLE-Routine (04.28.19 @ 06:39) >No radiographic evidence of acute cardiopulmonary disease.  - C/w albuterol, spiriva, budesonide  - c/w  Prednisone  - oxygen sat 98 on RA  -  Peak flow 125  - incentive spirometry  - Per pt, has nebulizer at home    # Lactic acidosis likely type B- resolved    #Hypertension  - c/w losartan, nifedipine, HCTZ   - c/w  toprol xl 50 mg   - Echo EF 70%    # DM type 2  - monitor FS  -c/w lantus, lispro    # H/o dyslipidemia  - c/w simvastatin     # H/o  Anxiety  - c/w sertraline     # H/o colorectal cancer s/p colectomy  - Last Colonoscopy in 2017, next  due in 2020     #GI/ DVT prophylaxis    # Full code    #Pending: awaiting clinical improvement. Peak flow low  # Discussed with patient  #Discharge: home when stable

## 2019-05-01 ENCOUNTER — TRANSCRIPTION ENCOUNTER (OUTPATIENT)
Age: 79
End: 2019-05-01

## 2019-05-01 LAB
GLUCOSE BLDC GLUCOMTR-MCNC: 158 MG/DL — HIGH (ref 70–99)
GLUCOSE BLDC GLUCOMTR-MCNC: 158 MG/DL — HIGH (ref 70–99)
GLUCOSE BLDC GLUCOMTR-MCNC: 165 MG/DL — HIGH (ref 70–99)
GLUCOSE BLDC GLUCOMTR-MCNC: 183 MG/DL — HIGH (ref 70–99)
GLUCOSE BLDC GLUCOMTR-MCNC: 236 MG/DL — HIGH (ref 70–99)
GLUCOSE BLDC GLUCOMTR-MCNC: 249 MG/DL — HIGH (ref 70–99)

## 2019-05-01 RX ORDER — METOPROLOL TARTRATE 50 MG
50 TABLET ORAL ONCE
Qty: 0 | Refills: 0 | Status: COMPLETED | OUTPATIENT
Start: 2019-05-01 | End: 2019-05-01

## 2019-05-01 RX ORDER — METOPROLOL TARTRATE 50 MG
100 TABLET ORAL DAILY
Qty: 0 | Refills: 0 | Status: DISCONTINUED | OUTPATIENT
Start: 2019-05-02 | End: 2019-05-02

## 2019-05-01 RX ORDER — ALBUTEROL 90 UG/1
1 AEROSOL, METERED ORAL
Qty: 0 | Refills: 0 | COMMUNITY
Start: 2019-05-01

## 2019-05-01 RX ORDER — PANTOPRAZOLE SODIUM 20 MG/1
1 TABLET, DELAYED RELEASE ORAL
Qty: 30 | Refills: 0
Start: 2019-05-01 | End: 2019-05-30

## 2019-05-01 RX ADMIN — Medication 50 MILLIGRAM(S): at 12:00

## 2019-05-01 RX ADMIN — Medication 3 MILLILITER(S): at 12:21

## 2019-05-01 RX ADMIN — Medication 8 UNIT(S): at 12:09

## 2019-05-01 RX ADMIN — Medication 50 MILLIGRAM(S): at 06:15

## 2019-05-01 RX ADMIN — Medication 4: at 08:46

## 2019-05-01 RX ADMIN — LOSARTAN POTASSIUM 25 MILLIGRAM(S): 100 TABLET, FILM COATED ORAL at 06:15

## 2019-05-01 RX ADMIN — Medication 3 MILLILITER(S): at 21:04

## 2019-05-01 RX ADMIN — ENOXAPARIN SODIUM 40 MILLIGRAM(S): 100 INJECTION SUBCUTANEOUS at 12:01

## 2019-05-01 RX ADMIN — Medication 4: at 12:08

## 2019-05-01 RX ADMIN — BUDESONIDE AND FORMOTEROL FUMARATE DIHYDRATE 2 PUFF(S): 160; 4.5 AEROSOL RESPIRATORY (INHALATION) at 11:59

## 2019-05-01 RX ADMIN — Medication 8 UNIT(S): at 08:47

## 2019-05-01 RX ADMIN — SERTRALINE 50 MILLIGRAM(S): 25 TABLET, FILM COATED ORAL at 12:00

## 2019-05-01 RX ADMIN — Medication 40 MILLIGRAM(S): at 17:50

## 2019-05-01 RX ADMIN — Medication 40 MILLIGRAM(S): at 06:15

## 2019-05-01 RX ADMIN — PANTOPRAZOLE SODIUM 40 MILLIGRAM(S): 20 TABLET, DELAYED RELEASE ORAL at 06:15

## 2019-05-01 RX ADMIN — Medication 60 MILLIGRAM(S): at 06:15

## 2019-05-01 RX ADMIN — Medication 3 MILLILITER(S): at 09:29

## 2019-05-01 RX ADMIN — INSULIN GLARGINE 24 UNIT(S): 100 INJECTION, SOLUTION SUBCUTANEOUS at 21:52

## 2019-05-01 RX ADMIN — Medication 2: at 17:40

## 2019-05-01 RX ADMIN — SIMVASTATIN 20 MILLIGRAM(S): 20 TABLET, FILM COATED ORAL at 21:52

## 2019-05-01 RX ADMIN — Medication 25 MILLIGRAM(S): at 06:15

## 2019-05-01 RX ADMIN — CHLORHEXIDINE GLUCONATE 1 APPLICATION(S): 213 SOLUTION TOPICAL at 06:14

## 2019-05-01 RX ADMIN — Medication 8 UNIT(S): at 17:41

## 2019-05-01 RX ADMIN — BUDESONIDE AND FORMOTEROL FUMARATE DIHYDRATE 2 PUFF(S): 160; 4.5 AEROSOL RESPIRATORY (INHALATION) at 20:10

## 2019-05-01 NOTE — DISCHARGE NOTE PROVIDER - CARE PROVIDERS DIRECT ADDRESSES
[email or web address] ([email or web address]

## 2019-05-01 NOTE — DISCHARGE NOTE PROVIDER - CARE PROVIDER_API CALL
Romulo Montero)  Critical Care Medicine; Geriatric Medicine; Internal Medicine; Pulmonary Disease  [address] [facility] [address] [facility], [place] [address]  Phone: [phone]  Fax: [phone]  Follow Up Time:     Diaz Pulliam)  Internal Medicine  [address] [place] [address]  Phone: [phone]  Fax: [phone]  Follow Up Time:

## 2019-05-01 NOTE — PROGRESS NOTE ADULT - ASSESSMENT
79 yo F PMHx essential HTN, DM2, DLD, moderate persistent asthma, colorectal carcinoma s/p colectomy, recurrent L nephrolithiasis (2006, 2008, with admission in 2014 for urosepsis s/p L ureteral stent for pyonephrosis) presents to the ED For progressive SOB x 2 days. Pt was admitted to ICU with diagnosis of Acute hypoxic respiratory failure sec to asthma exacerbation sec to rhino virus infection.  Pt was transferred to CEU.    #Acute hypoxic respiratory failure sec to asthma exacerbation resolved  # Asthma exacerbation 2/2 Rhinovirus -active  - Xray Chest 1 View- PORTABLE-Routine (04.28.19 @ 06:39) >No radiographic evidence of acute cardiopulmonary disease.  - C/w albuterol, spiriva, budesonide  - inc Prednisone to 40 q12h  - oxygen sat 98 on RA  -  Peak flow 125  - incentive spirometry  - Per pt, has nebulizer at home    # Lactic acidosis likely type B- resolved    #Hypertension  - c/w losartan, nifedipine, HCTZ   - resume  toprol xl home dose 100 mg (was getting 50 before)  - Echo EF 70%    # DM type 2  - monitor FS  -c/w lantus, lispro    # H/o dyslipidemia  - c/w simvastatin     # H/o  Anxiety  - c/w sertraline     # H/o colorectal cancer s/p colectomy  - Last Colonoscopy in 2017, next  due in 2020     #GI/ DVT prophylaxis    # Full code    #Pending: awaiting clinical improvement. Peak flow low  # Discussed with patient  #Discharge: home when stable

## 2019-05-01 NOTE — DISCHARGE NOTE PROVIDER - NSDCCPCAREPLAN_GEN_ALL_CORE_FT
PRINCIPAL DISCHARGE DIAGNOSIS  Diagnosis: Asthma exacerbation  Assessment and Plan of Treatment: You had asthma exacerbation due to rhinovirus.   Take medications as prescribed, including prednisone taper, advair, albuterol as needed.  Use incentive spirometer at least 10x per hour to exercise your lungs.   Measure peak flow daily.  Follow up with pulmonologist Dr. Montero and primary care Dr. Pulliam within 1 week.      SECONDARY DISCHARGE DIAGNOSES  Diagnosis: History of colorectal cancer  Assessment and Plan of Treatment: Follow up with gastroenterologist as scheduled for colonoscopy. PRINCIPAL DISCHARGE DIAGNOSIS  Diagnosis: Asthma exacerbation  Assessment and Plan of Treatment: You had asthma exacerbation due to rhinovirus.   Take medications as prescribed, including prednisone taper, advair, and albuterol as needed.  Use incentive spirometer at least 10x per hour to exercise your lungs.   Measure peak flow daily.  Follow up with pulmonologist Dr. Montero and primary care Dr. Pulliam within 1 week.      SECONDARY DISCHARGE DIAGNOSES  Diagnosis: History of colorectal cancer  Assessment and Plan of Treatment: Follow up with gastroenterologist as scheduled for colonoscopy. PRINCIPAL DISCHARGE DIAGNOSIS  Diagnosis: Asthma exacerbation  Assessment and Plan of Treatment: You had asthma exacerbation due to rhinovirus.   Take medications as prescribed, including prednisone taper, advair, tiotropium, and albuterol as needed.  Use incentive spirometer at least 10x per hour to exercise your lungs.   Measure peak flow daily.  Follow up with pulmonologist Dr. Montero and primary care Dr. Pulliam within 1 week.      SECONDARY DISCHARGE DIAGNOSES  Diagnosis: History of colorectal cancer  Assessment and Plan of Treatment: Follow up with gastroenterologist as scheduled for colonoscopy.

## 2019-05-01 NOTE — PROGRESS NOTE ADULT - SUBJECTIVE AND OBJECTIVE BOX
SUBJECTIVE:    Patient is a 78y old Female who presents with a chief complaint of Shortness of breath (2019 13:37)    Currently admitted to medicine with the primary diagnosis of Asthma exacerbation     Today is hospital day 5d. Overnight no event. Breathing stable on room air. Peak flow 125. Still coughing. Denied CP, abd pain, dysuria, diarrhea/constipation.    PAST MEDICAL & SURGICAL HISTORY  Left nephrolithiasis  History of colorectal cancer  DM (diabetes mellitus)  HLD (hyperlipidemia)  HTN (hypertension)  Asthma  S/P  section  S/P ureteral stent placement  S/P revision of total knee, right  S/P total knee replacement, right  H/O colectomy        ALLERGIES:  No Known Drug Allergies  Peaches (Rash)    MEDICATIONS:  STANDING MEDICATIONS  ALBUTerol    90 MICROgram(s) HFA Inhaler 1 Puff(s) Inhalation every 4 hours  ALBUTerol/ipratropium for Nebulization 3 milliLiter(s) Nebulizer every 4 hours  buDESOnide 160 MICROgram(s)/formoterol 4.5 MICROgram(s) Inhaler 2 Puff(s) Inhalation two times a day  chlorhexidine 4% Liquid 1 Application(s) Topical <User Schedule>  dextrose 5%. 1000 milliLiter(s) IV Continuous <Continuous>  dextrose 50% Injectable 12.5 Gram(s) IV Push once  dextrose 50% Injectable 25 Gram(s) IV Push once  dextrose 50% Injectable 25 Gram(s) IV Push once  enoxaparin Injectable 40 milliGRAM(s) SubCutaneous daily  hydrochlorothiazide 25 milliGRAM(s) Oral daily  insulin glargine Injectable (LANTUS) 24 Unit(s) SubCutaneous at bedtime  insulin lispro (HumaLOG) corrective regimen sliding scale   SubCutaneous three times a day before meals  insulin lispro Injectable (HumaLOG) 8 Unit(s) SubCutaneous three times a day before meals  losartan 25 milliGRAM(s) Oral daily  metoprolol succinate ER 50 milliGRAM(s) Oral once  NIFEdipine XL 60 milliGRAM(s) Oral daily  pantoprazole    Tablet 40 milliGRAM(s) Oral before breakfast  predniSONE   Tablet 40 milliGRAM(s) Oral every 12 hours  sertraline 50 milliGRAM(s) Oral daily  simvastatin 20 milliGRAM(s) Oral at bedtime  tiotropium 18 MICROgram(s) Capsule 1 Capsule(s) Inhalation daily    PRN MEDICATIONS  ALBUTerol/ipratropium for Nebulization 3 milliLiter(s) Nebulizer every 4 hours PRN  benzocaine 15 mG/menthol 3.6 mG (Sugar-Free) Lozenge 1 Lozenge Oral four times a day PRN  dextrose 40% Gel 15 Gram(s) Oral once PRN  glucagon  Injectable 1 milliGRAM(s) IntraMuscular once PRN      Asthma exacerbation      VITALS:   T(F): 96.1  HR: 88  BP: 140/79  RR: 18  SpO2: 98%    LABS:                        11.3   15.88 )-----------( 415      ( 2019 06:12 )             35.7         143  |  102  |  37<H>  ----------------------------<  111<H>  5.0   |  27  |  1.2    Ca    9.2      2019 06:12  Mg     2.0                         RADIOLOGY:    PHYSICAL EXAM:  O/E:  Awake, alert, NAD, coughin  HEENT: atraumatic, EOMI.  Chest: improved wheezes b/l  CVS: SIS2 +, no murmur.  P/A: Soft, BS+, non tender  CNS: non focal.  Ext: no edema feet.  Skin: no rash, no ulcers.

## 2019-05-01 NOTE — PROGRESS NOTE ADULT - SUBJECTIVE AND OBJECTIVE BOX
Patient is a 78y old  Female who presents with a chief complaint of Shortness of breath (2019 08:48)    Patient was seen and examined.  C/o cough.   Sob improving  Denies chest pain .  Denies any other complaints.  All systems reviewed positive history as mentioned above.    PAST MEDICAL & SURGICAL HISTORY:  Left nephrolithiasis  History of colorectal cancer  DM (diabetes mellitus)  HLD (hyperlipidemia)  HTN (hypertension)  Asthma  S/P  section  S/P ureteral stent placement  S/P revision of total knee, right  S/P total knee replacement, right  H/O colectomy    Allergies  No Known Drug Allergies  Peaches (Rash)    MEDICATIONS  (STANDING):  ALBUTerol    90 MICROgram(s) HFA Inhaler 1 Puff(s) Inhalation every 4 hours  ALBUTerol/ipratropium for Nebulization 3 milliLiter(s) Nebulizer every 4 hours  buDESOnide 160 MICROgram(s)/formoterol 4.5 MICROgram(s) Inhaler 2 Puff(s) Inhalation two times a day  chlorhexidine 4% Liquid 1 Application(s) Topical <User Schedule>  dextrose 5%. 1000 milliLiter(s) (50 mL/Hr) IV Continuous <Continuous>  dextrose 50% Injectable 12.5 Gram(s) IV Push once  dextrose 50% Injectable 25 Gram(s) IV Push once  dextrose 50% Injectable 25 Gram(s) IV Push once  enoxaparin Injectable 40 milliGRAM(s) SubCutaneous daily  hydrochlorothiazide 25 milliGRAM(s) Oral daily  insulin glargine Injectable (LANTUS) 24 Unit(s) SubCutaneous at bedtime  insulin lispro (HumaLOG) corrective regimen sliding scale   SubCutaneous three times a day before meals  insulin lispro Injectable (HumaLOG) 8 Unit(s) SubCutaneous three times a day before meals  losartan 25 milliGRAM(s) Oral daily  metoprolol succinate ER 50 milliGRAM(s) Oral once  NIFEdipine XL 60 milliGRAM(s) Oral daily  pantoprazole    Tablet 40 milliGRAM(s) Oral before breakfast  predniSONE   Tablet 40 milliGRAM(s) Oral every 12 hours  sertraline 50 milliGRAM(s) Oral daily  simvastatin 20 milliGRAM(s) Oral at bedtime  tiotropium 18 MICROgram(s) Capsule 1 Capsule(s) Inhalation daily    MEDICATIONS  (PRN):  ALBUTerol/ipratropium for Nebulization 3 milliLiter(s) Nebulizer every 4 hours PRN Shortness of Breath and/or Wheezing  benzocaine 15 mG/menthol 3.6 mG (Sugar-Free) Lozenge 1 Lozenge Oral four times a day PRN Sore Throat  dextrose 40% Gel 15 Gram(s) Oral once PRN Blood Glucose LESS THAN 70 milliGRAM(s)/deciliter  glucagon  Injectable 1 milliGRAM(s) IntraMuscular once PRN Glucose LESS THAN 70 milligrams/deciliter    ICU Vital Signs Last 24 Hrs  T(C): 35.6 (01 May 2019 05:38), Max: 36.2 (2019 14:01)  T(F): 96.1 (01 May 2019 05:38), Max: 97.2 (2019 14:01)  HR: 88 (01 May 2019 05:38) (88 - 95)  BP: 140/79 (01 May 2019 05:38) (113/61 - 140/79)  RR: 18 (01 May 2019 05:38) (18 - 20)  SpO2: 98% (01 May 2019 05:38) (96% - 98%)      O/E:  Awake, alert, not in distress.  HEENT: atraumatic, EOMI.  Chest: few wheezes b/l  CVS: SIS2 +, no murmur.  P/A: Soft, BS+  CNS: non focal.  Ext: no edema feet.  Skin: no rash, no ulcers.  All systems reviewed positive findings as above.                                   11.3<L>  15.88<H> )-----------( 415<H>    ( 2019 06:12 )             35.7<L>      143  |  102  |  37<H>  ----------------------------<  111<H>  5.0   |  27  |  1.2    Ca    9.2      2019 06:12  Mg     2.0

## 2019-05-01 NOTE — PROGRESS NOTE ADULT - ASSESSMENT
77 yo F with PMHx of essential HTN, DM2, DLD, moderate persistent asthma, hx of colorectal carcinoma s/p colectomy, hx of recurrent L nephrolithiasis (2006, 2008, with admission in 2014 for urosepsis s/p L ureteral stent for pyonephrosis) presents to the ED For progressive SOB x 2 days. Pt was admitted to ICU with diagnosis of Acute hypoxic respiratory failure sec to asthma exacerbation sec to rhino virus infection.  Pt was transfered to CEU last night    #Acute hypoxic respiratory failure sec to asthma exacerbation resolved  # Asthma exacerbation 2/2 Rhinovirus   - Xray Chest 1 View- PORTABLE-Routine (04.28.19 @ 06:39) >No radiographic evidence of acute cardiopulmonary disease.  - C/w albuterol, spiriva, budesonide  - increase Prednisone to 40 mg q12  - oxygen sat 98 on RA  -  Peak flow 150  - incentive spirometry    # Lactic acidosis likely type B- resolved    #Hypertension  - c/w losartan, nifedipine, HCTZ   - increase toprol xl 100 mg   - Echo EF 70%    # DM type 2  - monitor FS  -c/w lantus, lispro    # H/o dyslipidemia  - c/w simvastatin     # H/o  Anxiety  - c/w sertraline     # H/o colorectal cancer s/p colectomy  - Last Colonoscopy in 2017, next  due in 2020     #GI/ DVT prophylaxis    # Full code    #Pending: awaiting clinical improvement.  # Discussed with patient  #Discharge: home when stable

## 2019-05-01 NOTE — DISCHARGE NOTE PROVIDER - HOSPITAL COURSE
79 yo F with PMHx of essential HTN, DM2, DLD, moderate persistent asthma, hx of colorectal carcinoma s/p colectomy, hx of recurrent L nephrolithiasis (2006, 2008, with admission in 2014 for urosepsis s/p L ureteral stent for pyonephrosis) presents to the ED For progressive SOB x 2 days.     Pt was admitted to ICU with diagnosis of Acute hypoxic respiratory failure sec to asthma exacerbation sec to rhino virus infection.    Pt was transferred to CEU after improvement. 77 yo F with PMHx of essential HTN, DM2, DLD, moderate persistent asthma, hx of colorectal carcinoma s/p colectomy, hx of recurrent L nephrolithiasis (2006, 2008, with admission in 2014 for urosepsis s/p L ureteral stent for pyonephrosis) presents to the ED For progressive SOB x 2 days.     Pt was admitted to ICU with diagnosis of Acute hypoxic respiratory failure sec to asthma exacerbation sec to rhino virus infection.    Pt was transferred to CEU after improvement.        #Acute hypoxic respiratory failure sec to asthma exacerbation resolved    # Asthma exacerbation 2/2 Rhinovirus     - Xray Chest 1 View- PORTABLE-Routine (04.28.19 @ 06:39) >No radiographic evidence of acute cardiopulmonary disease.    - C/w albuterol, advair    - prednisone taper, 60 qd for 2 days, dec by 10 every 2 days    - oxygen sat 98 on RA    -  Peak flow 250    - incentive spirometry        # Lactic acidosis likely type B- resolved        #Hypertension    - c/w losartan, nifedipine, HCTZ, toprol xl 100 mg     - Echo EF 70%        # DM type 2    -resume home meds        # H/o dyslipidemia    - c/w simvastatin         # H/o  Anxiety    - c/w sertraline         # H/o colorectal cancer s/p colectomy    - Last Colonoscopy in 2017, next  due in 2020         Pt will be d/c home 79 yo F with PMHx of essential HTN, DM2, DLD, moderate persistent asthma, hx of colorectal carcinoma s/p colectomy, hx of recurrent L nephrolithiasis (2006, 2008, with admission in 2014 for urosepsis s/p L ureteral stent for pyonephrosis) presents to the ED For progressive SOB x 2 days.     Pt was admitted to ICU with diagnosis of Acute hypoxic respiratory failure sec to asthma exacerbation sec to rhino virus infection.    Pt was then  transferred to CEU .        #Acute hypoxic respiratory failure sec to asthma exacerbation resolved    # Asthma exacerbation 2/2 Rhinovirus     - Xray Chest 1 View- PORTABLE-Routine (04.28.19 @ 06:39) >No radiographic evidence of acute cardiopulmonary disease.    - C/w albuterol, advair    - prednisone taper, 60 qd for 2 days, dec by 10 every 2 days    - oxygen sat 98 on RA    -  Peak flow 250    - incentive spirometry        # Lactic acidosis likely type B- resolved        #Hypertension    - c/w losartan, nifedipine, HCTZ, toprol xl 100 mg     - Echo EF 70%        # DM type 2    -resume home meds        # H/o dyslipidemia    - c/w simvastatin         # H/o  Anxiety    - c/w sertraline         # H/o colorectal cancer s/p colectomy    - Last Colonoscopy in 2017, next  due in 2020         Pt will be d/c home

## 2019-05-02 VITALS
RESPIRATION RATE: 20 BRPM | SYSTOLIC BLOOD PRESSURE: 136 MMHG | OXYGEN SATURATION: 99 % | DIASTOLIC BLOOD PRESSURE: 85 MMHG | TEMPERATURE: 98 F | HEART RATE: 91 BPM

## 2019-05-02 PROBLEM — E11.9 TYPE 2 DIABETES MELLITUS WITHOUT COMPLICATIONS: Chronic | Status: ACTIVE | Noted: 2019-04-26

## 2019-05-02 PROBLEM — Z85.038 PERSONAL HISTORY OF OTHER MALIGNANT NEOPLASM OF LARGE INTESTINE: Chronic | Status: ACTIVE | Noted: 2019-04-26

## 2019-05-02 PROBLEM — E78.5 HYPERLIPIDEMIA, UNSPECIFIED: Chronic | Status: ACTIVE | Noted: 2019-04-26

## 2019-05-02 PROBLEM — J45.909 UNSPECIFIED ASTHMA, UNCOMPLICATED: Chronic | Status: ACTIVE | Noted: 2019-04-26

## 2019-05-02 PROBLEM — N20.0 CALCULUS OF KIDNEY: Chronic | Status: ACTIVE | Noted: 2019-04-26

## 2019-05-02 PROBLEM — I10 ESSENTIAL (PRIMARY) HYPERTENSION: Chronic | Status: ACTIVE | Noted: 2019-04-26

## 2019-05-02 LAB
GLUCOSE BLDC GLUCOMTR-MCNC: 207 MG/DL — HIGH (ref 70–99)
GLUCOSE BLDC GLUCOMTR-MCNC: 279 MG/DL — HIGH (ref 70–99)

## 2019-05-02 RX ORDER — TIOTROPIUM BROMIDE 18 UG/1
1 CAPSULE ORAL; RESPIRATORY (INHALATION)
Qty: 1 | Refills: 0
Start: 2019-05-02 | End: 2019-05-31

## 2019-05-02 RX ORDER — ALBUTEROL 90 UG/1
1 AEROSOL, METERED ORAL
Qty: 1 | Refills: 0
Start: 2019-05-02 | End: 2019-05-31

## 2019-05-02 RX ORDER — FLUTICASONE PROPIONATE AND SALMETEROL 50; 250 UG/1; UG/1
1 POWDER ORAL; RESPIRATORY (INHALATION)
Qty: 0 | Refills: 0 | COMMUNITY

## 2019-05-02 RX ORDER — FLUTICASONE PROPIONATE AND SALMETEROL 50; 250 UG/1; UG/1
1 POWDER ORAL; RESPIRATORY (INHALATION)
Qty: 1 | Refills: 0
Start: 2019-05-02 | End: 2019-05-31

## 2019-05-02 RX ORDER — INFLUENZA VIRUS VACCINE 15; 15; 15; 15 UG/.5ML; UG/.5ML; UG/.5ML; UG/.5ML
0.5 SUSPENSION INTRAMUSCULAR ONCE
Qty: 0 | Refills: 0 | Status: COMPLETED | OUTPATIENT
Start: 2019-05-02 | End: 2019-05-02

## 2019-05-02 RX ORDER — INFLUENZA VIRUS VACCINE 15; 15; 15; 15 UG/.5ML; UG/.5ML; UG/.5ML; UG/.5ML
0.5 SUSPENSION INTRAMUSCULAR ONCE
Qty: 0 | Refills: 0 | Status: DISCONTINUED | OUTPATIENT
Start: 2019-05-02 | End: 2019-05-02

## 2019-05-02 RX ADMIN — INFLUENZA VIRUS VACCINE 0.5 MILLILITER(S): 15; 15; 15; 15 SUSPENSION INTRAMUSCULAR at 11:45

## 2019-05-02 RX ADMIN — PANTOPRAZOLE SODIUM 40 MILLIGRAM(S): 20 TABLET, DELAYED RELEASE ORAL at 08:09

## 2019-05-02 RX ADMIN — BUDESONIDE AND FORMOTEROL FUMARATE DIHYDRATE 2 PUFF(S): 160; 4.5 AEROSOL RESPIRATORY (INHALATION) at 08:08

## 2019-05-02 RX ADMIN — SERTRALINE 50 MILLIGRAM(S): 25 TABLET, FILM COATED ORAL at 11:03

## 2019-05-02 RX ADMIN — Medication 3 MILLILITER(S): at 08:55

## 2019-05-02 RX ADMIN — Medication 100 MILLIGRAM(S): at 05:57

## 2019-05-02 RX ADMIN — Medication 8 UNIT(S): at 11:46

## 2019-05-02 RX ADMIN — Medication 6: at 11:46

## 2019-05-02 RX ADMIN — Medication 8 UNIT(S): at 08:09

## 2019-05-02 RX ADMIN — Medication 25 MILLIGRAM(S): at 05:57

## 2019-05-02 RX ADMIN — LOSARTAN POTASSIUM 25 MILLIGRAM(S): 100 TABLET, FILM COATED ORAL at 05:57

## 2019-05-02 RX ADMIN — ENOXAPARIN SODIUM 40 MILLIGRAM(S): 100 INJECTION SUBCUTANEOUS at 11:03

## 2019-05-02 RX ADMIN — Medication 4: at 08:09

## 2019-05-02 RX ADMIN — Medication 60 MILLIGRAM(S): at 05:57

## 2019-05-02 RX ADMIN — Medication 40 MILLIGRAM(S): at 05:58

## 2019-05-02 RX ADMIN — CHLORHEXIDINE GLUCONATE 1 APPLICATION(S): 213 SOLUTION TOPICAL at 11:01

## 2019-05-03 ENCOUNTER — CHART COPY (OUTPATIENT)
Age: 79
End: 2019-05-03

## 2019-05-03 ENCOUNTER — INBOUND DOCUMENT (OUTPATIENT)
Age: 79
End: 2019-05-03

## 2019-05-07 ENCOUNTER — OUTPATIENT (OUTPATIENT)
Dept: OUTPATIENT SERVICES | Facility: HOSPITAL | Age: 79
LOS: 1 days | Discharge: HOME | End: 2019-05-07

## 2019-05-07 ENCOUNTER — APPOINTMENT (OUTPATIENT)
Dept: PULMONOLOGY | Facility: CLINIC | Age: 79
End: 2019-05-07

## 2019-05-07 VITALS
BODY MASS INDEX: 28.02 KG/M2 | DIASTOLIC BLOOD PRESSURE: 72 MMHG | HEIGHT: 59 IN | HEART RATE: 77 BPM | SYSTOLIC BLOOD PRESSURE: 124 MMHG | OXYGEN SATURATION: 96 % | WEIGHT: 139 LBS | TEMPERATURE: 97.4 F

## 2019-05-07 DIAGNOSIS — I10 ESSENTIAL (PRIMARY) HYPERTENSION: ICD-10-CM

## 2019-05-07 DIAGNOSIS — B97.10 UNSPECIFIED ENTEROVIRUS AS THE CAUSE OF DISEASES CLASSIFIED ELSEWHERE: ICD-10-CM

## 2019-05-07 DIAGNOSIS — B97.89 OTHER VIRAL AGENTS AS THE CAUSE OF DISEASES CLASSIFIED ELSEWHERE: ICD-10-CM

## 2019-05-07 DIAGNOSIS — Z90.49 ACQUIRED ABSENCE OF OTHER SPECIFIED PARTS OF DIGESTIVE TRACT: Chronic | ICD-10-CM

## 2019-05-07 DIAGNOSIS — Z96.0 PRESENCE OF UROGENITAL IMPLANTS: Chronic | ICD-10-CM

## 2019-05-07 DIAGNOSIS — Z96.651 PRESENCE OF RIGHT ARTIFICIAL KNEE JOINT: Chronic | ICD-10-CM

## 2019-05-07 DIAGNOSIS — E87.2 ACIDOSIS: ICD-10-CM

## 2019-05-07 DIAGNOSIS — J96.01 ACUTE RESPIRATORY FAILURE WITH HYPOXIA: ICD-10-CM

## 2019-05-07 DIAGNOSIS — E11.9 TYPE 2 DIABETES MELLITUS WITHOUT COMPLICATIONS: ICD-10-CM

## 2019-05-07 DIAGNOSIS — F41.9 ANXIETY DISORDER, UNSPECIFIED: ICD-10-CM

## 2019-05-07 DIAGNOSIS — J45.901 UNSPECIFIED ASTHMA WITH (ACUTE) EXACERBATION: ICD-10-CM

## 2019-05-07 DIAGNOSIS — Z98.891 HISTORY OF UTERINE SCAR FROM PREVIOUS SURGERY: Chronic | ICD-10-CM

## 2019-05-07 DIAGNOSIS — E78.5 HYPERLIPIDEMIA, UNSPECIFIED: ICD-10-CM

## 2019-05-07 NOTE — PHYSICAL EXAM
[General Appearance - Well Developed] : well developed [Normal Appearance] : normal appearance [Well Groomed] : well groomed [General Appearance - Well Nourished] : well nourished [General Appearance - In No Acute Distress] : no acute distress [No Deformities] : no deformities [Normal Conjunctiva] : the conjunctiva exhibited no abnormalities [Normal Oropharynx] : normal oropharynx [Neck Appearance] : the appearance of the neck was normal [Heart Rate And Rhythm] : heart rate and rhythm were normal [Murmurs] : no murmurs present [Heart Sounds] : normal S1 and S2 [Exaggerated Use Of Accessory Muscles For Inspiration] : no accessory muscle use [Respiration, Rhythm And Depth] : normal respiratory rhythm and effort [Abdomen Soft] : soft [Auscultation Breath Sounds / Voice Sounds] : lungs were clear to auscultation bilaterally [Abdomen Tenderness] : non-tender [Abdomen Mass (___ Cm)] : no abdominal mass palpated [Nail Clubbing] : no clubbing of the fingernails [Cyanosis, Localized] : no localized cyanosis [Skin Color & Pigmentation] : normal skin color and pigmentation [No Venous Stasis] : no venous stasis [] : no rash [No Skin Ulcers] : no skin ulcer [Affect] : the affect was normal

## 2019-05-07 NOTE — HISTORY OF PRESENT ILLNESS
[Stable] : are stable [Difficulty Breathing During Exertion] : denies dyspnea on exertion [Feelings Of Weakness On Exertion] : denies exercise intolerance [Wheezing] : denies wheezing [Regional Soft Tissue Swelling Both Lower Extremities] : denies lower extremity edema [Chest Pain Or Discomfort] : denies chest pain [Fever] : denies fever [Cough] : coughing [Oxygen] : the patient uses no supplemental oxygen [de-identified] : recent hospitalization secondary to rhino virus triggered asthma exacerbation.  [FreeTextEntry1] : 78 F with PMH of HTN, DM and asthma, was diagnosed with asthma as a child when she was 16 but did not use inhalers or meds since then, went to ED in nov 2018 with wheezing and sob and was believed to be asthma and started on alb mdi and advair.\par she went to ED last week for sob, and was positive for rhinovirus, was admitted to ICU for acute hypoxemic resp failure. treated with iv steroids and nebs and mdi. was d/gretchen on prednisone taper and tiotropium and alb nebs and adviar was d/gretchen before discharge.\par she had pfts last year but due to cough she could not complete the test. \par never-smoker

## 2019-05-07 NOTE — ASSESSMENT
[FreeTextEntry1] : 78 F with PMH of HTN, DM and asthma, was diagnosed with asthma as a child when she was 16 but did not use inhalers or meds since then, went to ED in nov 2018 with wheezing and sob and was believed to be asthma and started on alb mdi and advair.\par she went to ED last week for sob, and was positive for rhinovirus, was admitted to ICU for acute hypoxemic resp failure. treated with iv steroids and nebs and mdi. was d/gretchen on prednisone taper and tiotropium and alb nebs and adviar was d/gretchen before discharge.\par she had pfts last year but due to cough she could not complete the test. \par never-smoker\par \par 1- H/o asthma , no confirmatory diagnosis with PFTs.\par currently denies any sob/wheezing/fever/lungs- clear to auscultation. No history of nocturnal cough and wheezing makes uncontrolled asthma very unlikely. \par for now use alb nebs only as needed and not necessarily 5 times a day(she is taking it 5 times as the discharge paper suggests to take it every 4 hrs as needed)\par for now- hold off on any other inhalers (spiriva or advair )\par PFTs full with Bronchodilator challenge test- \par patient advised NOT to take alb on the night before and on the day of PFTs\par if PFTs normal,. will need methacholine challenge test.\par f/u in 1 week after PFTs\par  none

## 2019-05-10 ENCOUNTER — OUTPATIENT (OUTPATIENT)
Dept: OUTPATIENT SERVICES | Facility: HOSPITAL | Age: 79
LOS: 1 days | Discharge: HOME | End: 2019-05-10

## 2019-05-10 ENCOUNTER — APPOINTMENT (OUTPATIENT)
Dept: INTERNAL MEDICINE | Facility: CLINIC | Age: 79
End: 2019-05-10

## 2019-05-10 VITALS
HEIGHT: 59 IN | HEART RATE: 69 BPM | SYSTOLIC BLOOD PRESSURE: 135 MMHG | WEIGHT: 139 LBS | BODY MASS INDEX: 28.02 KG/M2 | DIASTOLIC BLOOD PRESSURE: 81 MMHG

## 2019-05-10 DIAGNOSIS — Z90.49 ACQUIRED ABSENCE OF OTHER SPECIFIED PARTS OF DIGESTIVE TRACT: Chronic | ICD-10-CM

## 2019-05-10 DIAGNOSIS — E11.9 TYPE 2 DIABETES MELLITUS WITHOUT COMPLICATIONS: ICD-10-CM

## 2019-05-10 DIAGNOSIS — J45.998 OTHER ASTHMA: ICD-10-CM

## 2019-05-10 DIAGNOSIS — Z96.651 PRESENCE OF RIGHT ARTIFICIAL KNEE JOINT: Chronic | ICD-10-CM

## 2019-05-10 DIAGNOSIS — C18.9 MALIGNANT NEOPLASM OF COLON, UNSPECIFIED: ICD-10-CM

## 2019-05-10 DIAGNOSIS — Z96.0 PRESENCE OF UROGENITAL IMPLANTS: Chronic | ICD-10-CM

## 2019-05-10 DIAGNOSIS — Z98.891 HISTORY OF UTERINE SCAR FROM PREVIOUS SURGERY: Chronic | ICD-10-CM

## 2019-05-10 RX ORDER — FLUTICASONE PROPIONATE AND SALMETEROL 50; 250 UG/1; UG/1
250-50 POWDER RESPIRATORY (INHALATION)
Qty: 1 | Refills: 5 | Status: DISCONTINUED | COMMUNITY
Start: 2019-03-15 | End: 2019-05-10

## 2019-05-10 RX ORDER — GUAIFENESIN/DEXTROMETHORPHAN 100-10MG/5
100-10 SYRUP ORAL EVERY 4 HOURS
Qty: 1 | Refills: 0 | Status: DISCONTINUED | COMMUNITY
Start: 2019-03-15 | End: 2019-05-10

## 2019-05-10 NOTE — PHYSICAL EXAM
[No Acute Distress] : no acute distress [Well Nourished] : well nourished [Well Developed] : well developed [Well-Appearing] : well-appearing [Normal Sclera/Conjunctiva] : normal sclera/conjunctiva [PERRL] : pupils equal round and reactive to light [EOMI] : extraocular movements intact [Normal Outer Ear/Nose] : the outer ears and nose were normal in appearance [Normal Oropharynx] : the oropharynx was normal [Supple] : supple [No Respiratory Distress] : no respiratory distress  [Clear to Auscultation] : lungs were clear to auscultation bilaterally [No Accessory Muscle Use] : no accessory muscle use [Normal Rate] : normal rate  [Regular Rhythm] : with a regular rhythm [Normal S1, S2] : normal S1 and S2 [No Murmur] : no murmur heard [No Edema] : there was no peripheral edema [No Extremity Clubbing/Cyanosis] : no extremity clubbing/cyanosis [Soft] : abdomen soft [Non Tender] : non-tender [Non-distended] : non-distended [No HSM] : no HSM [Normal Bowel Sounds] : normal bowel sounds [No Spinal Tenderness] : no spinal tenderness [Grossly Normal Strength/Tone] : grossly normal strength/tone [Coordination Grossly Intact] : coordination grossly intact [No Focal Deficits] : no focal deficits [Normal Affect] : the affect was normal [Normal Insight/Judgement] : insight and judgment were intact [de-identified] : Stiffness at right knee joint [de-identified] : Corneal arcus BL [de-identified] : Abdominal scar

## 2019-05-10 NOTE — ASSESSMENT
[FreeTextEntry1] : This is a 78 year old female with a PMHx of DLD, T2DM, HTN, and suspected asthma presenting to the clinic for follow up. She was recently admitted with the ICU for acute hypoxic respiratory failure 2/2 viral infection. She followed up at the Pulmonology clinic and is to have PFT completed for further investigation of suspected chronic lung disease. She had lab work completed in March 2018 which revealed improved cholesterol and A1c. Her HTN is also controlled. \par \par TYPE II DIABETES MELLITUS\par - A1c 8.5 (03/2019); repeat again before next visit\par - Continue follow up with Dr. Modi (Last seen 04/2018)\par - Continue with Pioglitazone and Metformin\par - Follow up with Podiatry and Opthalmology (referral given this visit)\par - Advised low carbohydrate and simple sugar diet\par \par HYPERTENSION; CONTROLLED\par - Continue Toprol, HCTZ, and Nifedipine\par \par DYSLIPIDEMIA; CONTROLLED\par - Continue Simvastatin \par - Can repeat lipid profile in 03/2020\par \par SUSPECTED ASTHMA\par - Follow up with Dr Tony after competing PFT\par \par DEPRESSION; CONTROLLED\par - Continue Sertraline\par \par THROMBOCYTOSIS; CHRONIC\par - Follow up with Hematology (referral given this visit)\par - Hx of being of blood thinners per patient for a short time ~15 years ago, but unsure why\par \par RIGHT KNEE STIFFNESS\par - s/p Right knee replacement\par - To go for repeat knee replacement in future\par \par HX of Colon Cancer; S/P resection (1991)\par - No active problems\par - Follow up with GI for repeat colonoscopy in 10/2020\par \par HEALTH CARE MAINTENANCE\par - PNA 13/23 given\par - 2018/2019 flu vaccine given\par - Colonoscopy performed in 10/2017; repeat 10/2020 (3 years)\par - Can follow up in 6 months and prn

## 2019-05-10 NOTE — HISTORY OF PRESENT ILLNESS
[FreeTextEntry1] : Follow up [de-identified] : This is a 78 year old female with a PMHx of DLD, T2DM, HTN, and suspected asthma presenting to the clinic for follow up. She was recently admitted with the ICU for acute hypoxic respiratory failure 2/2 viral infection. She followed up at the Pulmonology clinic and is to have PFT completed for further investigation of suspected chronic lung disease. She had lab work completed in March 2018 which revealed improved cholesterol and A1c. Her HTN is also controlled. She has no complaints today. ROS is entirely negative.

## 2019-05-20 ENCOUNTER — OUTPATIENT (OUTPATIENT)
Dept: OUTPATIENT SERVICES | Facility: HOSPITAL | Age: 79
LOS: 1 days | Discharge: HOME | End: 2019-05-20

## 2019-05-20 DIAGNOSIS — J45.909 UNSPECIFIED ASTHMA, UNCOMPLICATED: ICD-10-CM

## 2019-05-20 DIAGNOSIS — Z96.651 PRESENCE OF RIGHT ARTIFICIAL KNEE JOINT: Chronic | ICD-10-CM

## 2019-05-20 DIAGNOSIS — Z96.0 PRESENCE OF UROGENITAL IMPLANTS: Chronic | ICD-10-CM

## 2019-05-20 DIAGNOSIS — Z90.49 ACQUIRED ABSENCE OF OTHER SPECIFIED PARTS OF DIGESTIVE TRACT: Chronic | ICD-10-CM

## 2019-05-20 DIAGNOSIS — Z98.891 HISTORY OF UTERINE SCAR FROM PREVIOUS SURGERY: Chronic | ICD-10-CM

## 2019-05-29 ENCOUNTER — RX RENEWAL (OUTPATIENT)
Age: 79
End: 2019-05-29

## 2019-06-04 ENCOUNTER — APPOINTMENT (OUTPATIENT)
Dept: PULMONOLOGY | Facility: CLINIC | Age: 79
End: 2019-06-04

## 2019-06-25 ENCOUNTER — OUTPATIENT (OUTPATIENT)
Dept: OUTPATIENT SERVICES | Facility: HOSPITAL | Age: 79
LOS: 1 days | Discharge: HOME | End: 2019-06-25

## 2019-06-25 ENCOUNTER — APPOINTMENT (OUTPATIENT)
Dept: PULMONOLOGY | Facility: CLINIC | Age: 79
End: 2019-06-25
Payer: MEDICARE

## 2019-06-25 VITALS
SYSTOLIC BLOOD PRESSURE: 147 MMHG | BODY MASS INDEX: 28.63 KG/M2 | WEIGHT: 142 LBS | OXYGEN SATURATION: 96 % | HEART RATE: 73 BPM | HEIGHT: 59 IN | TEMPERATURE: 97.6 F | DIASTOLIC BLOOD PRESSURE: 75 MMHG

## 2019-06-25 DIAGNOSIS — Z98.891 HISTORY OF UTERINE SCAR FROM PREVIOUS SURGERY: Chronic | ICD-10-CM

## 2019-06-25 DIAGNOSIS — Z96.651 PRESENCE OF RIGHT ARTIFICIAL KNEE JOINT: Chronic | ICD-10-CM

## 2019-06-25 DIAGNOSIS — Z96.0 PRESENCE OF UROGENITAL IMPLANTS: Chronic | ICD-10-CM

## 2019-06-25 DIAGNOSIS — Z90.49 ACQUIRED ABSENCE OF OTHER SPECIFIED PARTS OF DIGESTIVE TRACT: Chronic | ICD-10-CM

## 2019-06-25 PROCEDURE — 99213 OFFICE O/P EST LOW 20 MIN: CPT | Mod: GC

## 2019-06-25 NOTE — HISTORY OF PRESENT ILLNESS
[None] : The patient is currently asymptomatic [2  -  Slight] : 2, slight [Class II - Mild Symptoms and Slight Limitations] : II [Date: ___] : was performed [unfilled] [Never] : was never a smoker [de-identified] : no acute pathology [de-identified] : incomplete was unable to follow command [FreeTextEntry1] : 79 years old female pt with past medical hx of type 2 DM last hba1c 8.5, DLD, HTN, ? asthma came for follow up.\par she was diagnosed with asthma as a child since 1957 onward she denies any symptoms, last year 2018 November she had her first attack, followed by severe asthma execerbation due to rhino virus in may 2019 which requires intubation due to hypoxic respiratory failure\par She denies any symptoms, no SOB, no wheezing, no chest pain, no palpitation, At baseline she walks with a walker after her knee replacement in 2012, she is able to walk few block s without being SOB, has trouble walking which she attributes because of her arthritis.\par she notices bilateral lower leg swelling which is mild, not bothering her.\par she denies using rescue inhaler for last 1 months\par she was scheduled of pulmonary function test, lorenzo was unable to complete it

## 2019-06-25 NOTE — ASSESSMENT
[FreeTextEntry1] : 79 years old female pt with past medical hx of type 2 DM last hba1c 8.5, DLD, HTN, ? asthma came for follow up.\par \par # H/O of asthma no confirmatory PFT\par    failed to complete PFT last time, was not following command\par    she denies any symptoms, no SOB, no wheezing\par     advised to use rescue inhaler when needed\par    asymptomatic no need for further workup at this point\par \par # HTN\par    well controlled\par \par # type 2 DM\par    need to follow hba1c last 8.5\par    currently taking metformin\par     advised to hold pioglitazone for now, as pt has recurrent hypoglycemic episodes\par \par # minimal bilateral lower leg swelling\par    likely secondary to medications  (pioglitazone/nifedipine)\par    last echo may 2019 showed EF 71\par \par # follow up in 6 months, advised to call if any symptoms

## 2019-06-25 NOTE — REASON FOR VISIT
[Follow-Up] : a follow-up visit [Wheezing] : wheezing [Formal Caregiver] : formal caregiver [Asthma] : asthma

## 2019-06-25 NOTE — PHYSICAL EXAM
[General Appearance - Well Developed] : well developed [General Appearance - Well Nourished] : well nourished [Normal Oropharynx] : normal oropharynx [Neck Appearance] : the appearance of the neck was normal [Heart Sounds] : normal S1 and S2 [] : no respiratory distress [Respiration, Rhythm And Depth] : normal respiratory rhythm and effort [Abdomen Soft] : soft [Bowel Sounds] : normal bowel sounds [Nail Clubbing] : no clubbing of the fingernails [Petechial Hemorrhages (___cm)] : no petechial hemorrhages [1+ Pitting] : 1+  pitting [Skin Color & Pigmentation] : normal skin color and pigmentation [Skin Turgor] : normal skin turgor [Cranial Nerves] : cranial nerves 2-12 were intact [Deep Tendon Reflexes (DTR)] : deep tendon reflexes were 2+ and symmetric [Oriented To Time, Place, And Person] : oriented to person, place, and time

## 2019-08-20 ENCOUNTER — FORM ENCOUNTER (OUTPATIENT)
Age: 79
End: 2019-08-20

## 2019-08-21 ENCOUNTER — OUTPATIENT (OUTPATIENT)
Dept: OUTPATIENT SERVICES | Facility: HOSPITAL | Age: 79
LOS: 1 days | Discharge: HOME | End: 2019-08-21
Payer: MEDICARE

## 2019-08-21 ENCOUNTER — APPOINTMENT (OUTPATIENT)
Dept: INTERNAL MEDICINE | Facility: CLINIC | Age: 79
End: 2019-08-21

## 2019-08-21 ENCOUNTER — OUTPATIENT (OUTPATIENT)
Dept: OUTPATIENT SERVICES | Facility: HOSPITAL | Age: 79
LOS: 1 days | Discharge: HOME | End: 2019-08-21

## 2019-08-21 VITALS
DIASTOLIC BLOOD PRESSURE: 78 MMHG | HEIGHT: 59 IN | SYSTOLIC BLOOD PRESSURE: 150 MMHG | HEART RATE: 80 BPM | BODY MASS INDEX: 27.21 KG/M2 | WEIGHT: 135 LBS

## 2019-08-21 DIAGNOSIS — Z96.651 PRESENCE OF RIGHT ARTIFICIAL KNEE JOINT: Chronic | ICD-10-CM

## 2019-08-21 DIAGNOSIS — J45.909 UNSPECIFIED ASTHMA, UNCOMPLICATED: ICD-10-CM

## 2019-08-21 DIAGNOSIS — Z98.891 HISTORY OF UTERINE SCAR FROM PREVIOUS SURGERY: Chronic | ICD-10-CM

## 2019-08-21 DIAGNOSIS — Z90.49 ACQUIRED ABSENCE OF OTHER SPECIFIED PARTS OF DIGESTIVE TRACT: Chronic | ICD-10-CM

## 2019-08-21 DIAGNOSIS — Z96.0 PRESENCE OF UROGENITAL IMPLANTS: Chronic | ICD-10-CM

## 2019-08-21 PROCEDURE — 99201 OFFICE OUTPATIENT NEW 10 MINUTES: CPT

## 2019-08-21 PROCEDURE — 71046 X-RAY EXAM CHEST 2 VIEWS: CPT | Mod: 26

## 2019-08-21 NOTE — PHYSICAL EXAM
[No Acute Distress] : no acute distress [Well Nourished] : well nourished [Well Developed] : well developed [No Respiratory Distress] : no respiratory distress  [No Accessory Muscle Use] : no accessory muscle use [Normal Rate] : normal rate  [Regular Rhythm] : with a regular rhythm [Soft] : abdomen soft [Non Tender] : non-tender [Non-distended] : non-distended [de-identified] : sounds congested/ wheezes bilaterally

## 2019-08-21 NOTE — ASSESSMENT
[FreeTextEntry1] : This is a 79 year old female with a PMHx of DLD, T2DM, HTN, and suspected asthma presenting to the clinic for cough  and shortness of breath for three weeks. She was admitted to the ICU in [place] 2019 for acute hypoxic respiratory failure 2/2 viral infection. She followed up at the Pulmonology clinic and was told no further work up after a period of doing better. today she mentions past three weeks asthma has worsened.\par \par #Asthma\par on Albuterol, \par will order chest x ray\par will start [id number] 1 puff two times a day\par \par TYPE II DIABETES MELLITUS\par - A1c 8.5 (03/2019); repeat again before next visit\par - Continue follow up with Dr. Modi (Last seen 04/2018)\par - Continue with Pioglitazone and Metformin\par - Follow up with Podiatry and Opthalmology (referral given last visit)\par - Advised low carbohydrate and simple sugar diet\par \par HYPERTENSION; CONTROLLED\par - Continue Toprol, HCTZ, and Nifedipine\par \par DYSLIPIDEMIA; CONTROLLED\par - Continue Simvastatin \par - Can repeat lipid profile in 03/2020\par \par SUSPECTED ASTHMA\par - Follow up with Dr Tony after competing PFT\par \par DEPRESSION; CONTROLLED\par - Continue Sertraline\par \par THROMBOCYTOSIS; CHRONIC\par - Follow up with Hematology (referral given last visit)\par - Hx of being of blood thinners per patient for a short time ~15 years ago, but unsure why\par \par RIGHT KNEE STIFFNESS\par - s/p Right knee replacement\par - To go for repeat knee replacement in future\par \par HX of Colon Cancer; S/P resection (1991)\par - No active problems\par - Follow up with GI for repeat colonoscopy in 10/2020\par #anxious depression psych eval\par HEALTH CARE MAINTENANCE\par - PNA 13/23 given.\par - 2018/2019 flu vaccine given\par - Colonoscopy performed in 10/2017; repeat 10/2020 (3 years)\par - Can follow up in 6 months and prn. \par

## 2019-08-21 NOTE — HISTORY OF PRESENT ILLNESS
[de-identified] : This is a 79 year old female with a PMHx of DLD, T2DM, HTN, and suspected asthma presenting to the clinic for follow up. She was  admitted IN May 2019 to the ICU for acute hypoxic respiratory failure 2/2 viral infection. She followed up at the Pulmonology clinic and was discharged saying no futher work up after being unable to complete a PFT.\par \par Pt. complains of three weeks of productive coughing, feeling shortness of breath, uses albuterol which helps alleviate symptoms.\par No fever, no chills, no nausea, chest pain when coughs. no abdominal pain. \par \par  She had lab work completed in March 2018 which revealed improved cholesterol and A1c. Her HTN is also controlled.

## 2019-08-21 NOTE — REVIEW OF SYSTEMS
[Chest Pain] : chest pain [Shortness Of Breath] : shortness of breath [Wheezing] : wheezing [Cough] : cough [Fever] : no fever [Chills] : no chills [Abdominal Pain] : no abdominal pain [Nausea] : no nausea [Constipation] : no constipation [Vomiting] : no vomiting

## 2019-08-22 DIAGNOSIS — F33.1 MAJOR DEPRESSIVE DISORDER, RECURRENT, MODERATE: ICD-10-CM

## 2019-08-22 DIAGNOSIS — F32.9 MAJOR DEPRESSIVE DISORDER, SINGLE EPISODE, UNSPECIFIED: ICD-10-CM

## 2019-09-04 LAB
ANION GAP SERPL CALC-SCNC: 18 MMOL/L
BASOPHILS # BLD AUTO: 0.09 K/UL
BASOPHILS NFR BLD AUTO: 0.7 %
BUN SERPL-MCNC: 24 MG/DL
CALCIUM SERPL-MCNC: 9.4 MG/DL
CHLORIDE SERPL-SCNC: 101 MMOL/L
CO2 SERPL-SCNC: 22 MMOL/L
CREAT SERPL-MCNC: 1 MG/DL
EOSINOPHIL # BLD AUTO: 1.59 K/UL
EOSINOPHIL NFR BLD AUTO: 11.8 %
ESTIMATED AVERAGE GLUCOSE: 192 MG/DL
GLUCOSE SERPL-MCNC: 167 MG/DL
HBA1C MFR BLD HPLC: 8.3 %
HCT VFR BLD CALC: 39 %
HGB BLD-MCNC: 12.2 G/DL
IMM GRANULOCYTES NFR BLD AUTO: 0.6 %
LYMPHOCYTES # BLD AUTO: 3.16 K/UL
LYMPHOCYTES NFR BLD AUTO: 23.4 %
MAN DIFF?: NORMAL
MCHC RBC-ENTMCNC: 27.2 PG
MCHC RBC-ENTMCNC: 31.3 G/DL
MCV RBC AUTO: 87.1 FL
MONOCYTES # BLD AUTO: 0.82 K/UL
MONOCYTES NFR BLD AUTO: 6.1 %
NEUTROPHILS # BLD AUTO: 7.74 K/UL
NEUTROPHILS NFR BLD AUTO: 57.4 %
PLATELET # BLD AUTO: 466 K/UL
POTASSIUM SERPL-SCNC: 4 MMOL/L
RBC # BLD: 4.48 M/UL
RBC # FLD: 13.1 %
SODIUM SERPL-SCNC: 141 MMOL/L
WBC # FLD AUTO: 13.48 K/UL

## 2019-10-11 NOTE — H&P ADULT - NSHPPOACENTRALVENOUSCATHETER_GEN_ALL_CORE
Health Maintenance Due   Topic Date Due   • Breast Cancer Screening  08/22/2011   • Shingles Vaccine (1 of 2) 08/22/2016   • Diabetes Eye Exam  11/13/2018   • Influenza Vaccine (1) 09/01/2019       Patient is due for topics as listed above but is not proceeding with Immunization(s) Influenza and Shingles and Mammogram at this time. Education provided for Immunization(s) Influenza and Mammogram         no

## 2019-12-05 ENCOUNTER — RX RENEWAL (OUTPATIENT)
Age: 79
End: 2019-12-05

## 2020-01-07 ENCOUNTER — RX RENEWAL (OUTPATIENT)
Age: 80
End: 2020-01-07

## 2020-02-05 ENCOUNTER — OUTPATIENT (OUTPATIENT)
Dept: OUTPATIENT SERVICES | Facility: HOSPITAL | Age: 80
LOS: 1 days | Discharge: HOME | End: 2020-02-05

## 2020-02-05 ENCOUNTER — APPOINTMENT (OUTPATIENT)
Dept: INTERNAL MEDICINE | Facility: CLINIC | Age: 80
End: 2020-02-05
Payer: MEDICARE

## 2020-02-05 VITALS
HEART RATE: 76 BPM | DIASTOLIC BLOOD PRESSURE: 83 MMHG | WEIGHT: 138 LBS | SYSTOLIC BLOOD PRESSURE: 168 MMHG | BODY MASS INDEX: 27.82 KG/M2 | HEIGHT: 59 IN

## 2020-02-05 DIAGNOSIS — Z98.891 HISTORY OF UTERINE SCAR FROM PREVIOUS SURGERY: Chronic | ICD-10-CM

## 2020-02-05 DIAGNOSIS — Z90.49 ACQUIRED ABSENCE OF OTHER SPECIFIED PARTS OF DIGESTIVE TRACT: Chronic | ICD-10-CM

## 2020-02-05 DIAGNOSIS — Z96.0 PRESENCE OF UROGENITAL IMPLANTS: Chronic | ICD-10-CM

## 2020-02-05 DIAGNOSIS — Z96.651 PRESENCE OF RIGHT ARTIFICIAL KNEE JOINT: Chronic | ICD-10-CM

## 2020-02-05 PROCEDURE — 99213 OFFICE O/P EST LOW 20 MIN: CPT | Mod: GC

## 2020-02-05 NOTE — PHYSICAL EXAM
[Normal] : no joint swelling and grossly normal strength and tone [de-identified] : diffuse rhonchi bilaterally upper and lower lung fields

## 2020-02-05 NOTE — ASSESSMENT
[FreeTextEntry1] : 79 year old female with a PMHx of DLD, T2DM, HTN, and suspected asthma presenting to the clinic for cough  and shortness of breath for three weeks. She was admitted to the ICU in [place] 2019 for acute hypoxic respiratory failure 2/2 viral infection. She followed up at the Pulmonology clinic and was told no further work up after a period of doing better. today she mentions past three weeks asthma has worsened.\par \par # Asthma\par - on Albuterol\par - advair 100/50 1 puff two times a day\par \par # \par \par # DM2\par -  A1c 8.3 (08/2019); repeat again before next visit\par - Continue follow up with Dr. Modi (Last seen 04/2018)\par - Continue with Pioglitazone and Metformin\par - Follow up with Podiatry and Opthalmology (referral given last visit)\par - Advised low carbohydrate and simple sugar diet\par \par # HTN\par - Continue Toprol, HCTZ, and Nifedipine\par \par # DLD\par - Continue Simvastatin \par - Can repeat lipid profile in 03/2020\par \par # Asthma\par - Follow up with Dr Tony\par \par # Depression\par - Continue Sertraline\par \par # Thrombocytosis\par - Follow up with Hematology (referral given last visit)\par - Hx of being of blood thinners per patient for a short time ~15 years ago, but unsure why\par \par # Colon cancer s/p resection \par - No active problems\par - Follow up with GI for repeat colonoscopy in 10/2020\par \par # anxious depression \par - psych eval, didn’t go will refer once again\par \par HEALTH CARE MAINTENANCE\par - PNA 13/23 given.\par - 2018/2019 flu vaccine given\par - Colonoscopy performed in 10/2017; repeat 10/2020 (3 years)\par - Can follow up in 6 months and prn. \par

## 2020-02-10 DIAGNOSIS — E11.65 TYPE 2 DIABETES MELLITUS WITH HYPERGLYCEMIA: ICD-10-CM

## 2020-02-10 DIAGNOSIS — R01.1 CARDIAC MURMUR, UNSPECIFIED: ICD-10-CM

## 2020-02-10 DIAGNOSIS — I10 ESSENTIAL (PRIMARY) HYPERTENSION: ICD-10-CM

## 2020-02-17 ENCOUNTER — EMERGENCY (EMERGENCY)
Facility: HOSPITAL | Age: 80
LOS: 0 days | Discharge: HOME | End: 2020-02-17
Attending: EMERGENCY MEDICINE | Admitting: EMERGENCY MEDICINE
Payer: MEDICARE

## 2020-02-17 VITALS
HEART RATE: 90 BPM | OXYGEN SATURATION: 96 % | DIASTOLIC BLOOD PRESSURE: 59 MMHG | SYSTOLIC BLOOD PRESSURE: 127 MMHG | RESPIRATION RATE: 20 BRPM

## 2020-02-17 VITALS — TEMPERATURE: 98 F

## 2020-02-17 DIAGNOSIS — Z90.49 ACQUIRED ABSENCE OF OTHER SPECIFIED PARTS OF DIGESTIVE TRACT: Chronic | ICD-10-CM

## 2020-02-17 DIAGNOSIS — J45.909 UNSPECIFIED ASTHMA, UNCOMPLICATED: ICD-10-CM

## 2020-02-17 DIAGNOSIS — Z96.651 PRESENCE OF RIGHT ARTIFICIAL KNEE JOINT: Chronic | ICD-10-CM

## 2020-02-17 DIAGNOSIS — Z91.018 ALLERGY TO OTHER FOODS: ICD-10-CM

## 2020-02-17 DIAGNOSIS — Z87.891 PERSONAL HISTORY OF NICOTINE DEPENDENCE: ICD-10-CM

## 2020-02-17 DIAGNOSIS — Z96.0 PRESENCE OF UROGENITAL IMPLANTS: Chronic | ICD-10-CM

## 2020-02-17 DIAGNOSIS — Z98.891 HISTORY OF UTERINE SCAR FROM PREVIOUS SURGERY: Chronic | ICD-10-CM

## 2020-02-17 DIAGNOSIS — R06.00 DYSPNEA, UNSPECIFIED: ICD-10-CM

## 2020-02-17 LAB
ALBUMIN SERPL ELPH-MCNC: 4.6 G/DL — SIGNIFICANT CHANGE UP (ref 3.5–5.2)
ALP SERPL-CCNC: 69 U/L — SIGNIFICANT CHANGE UP (ref 30–115)
ALT FLD-CCNC: 8 U/L — SIGNIFICANT CHANGE UP (ref 0–41)
ANION GAP SERPL CALC-SCNC: 15 MMOL/L — HIGH (ref 7–14)
AST SERPL-CCNC: 11 U/L — SIGNIFICANT CHANGE UP (ref 0–41)
BASE EXCESS BLDV CALC-SCNC: -2.5 MMOL/L — LOW (ref -2–2)
BASOPHILS # BLD AUTO: 0.1 K/UL — SIGNIFICANT CHANGE UP (ref 0–0.2)
BASOPHILS NFR BLD AUTO: 0.7 % — SIGNIFICANT CHANGE UP (ref 0–1)
BILIRUB SERPL-MCNC: <0.2 MG/DL — SIGNIFICANT CHANGE UP (ref 0.2–1.2)
BUN SERPL-MCNC: 31 MG/DL — HIGH (ref 10–20)
CA-I SERPL-SCNC: 1.27 MMOL/L — SIGNIFICANT CHANGE UP (ref 1.12–1.3)
CALCIUM SERPL-MCNC: 9.9 MG/DL — SIGNIFICANT CHANGE UP (ref 8.5–10.1)
CHLORIDE SERPL-SCNC: 98 MMOL/L — SIGNIFICANT CHANGE UP (ref 98–110)
CO2 SERPL-SCNC: 22 MMOL/L — SIGNIFICANT CHANGE UP (ref 17–32)
CREAT SERPL-MCNC: 1 MG/DL — SIGNIFICANT CHANGE UP (ref 0.7–1.5)
EOSINOPHIL # BLD AUTO: 1.32 K/UL — HIGH (ref 0–0.7)
EOSINOPHIL NFR BLD AUTO: 9.8 % — HIGH (ref 0–8)
GAS PNL BLDV: 139 MMOL/L — SIGNIFICANT CHANGE UP (ref 136–145)
GAS PNL BLDV: SIGNIFICANT CHANGE UP
GLUCOSE SERPL-MCNC: 236 MG/DL — HIGH (ref 70–99)
HCO3 BLDV-SCNC: 23 MMOL/L — SIGNIFICANT CHANGE UP (ref 22–29)
HCT VFR BLD CALC: 38.5 % — SIGNIFICANT CHANGE UP (ref 37–47)
HCT VFR BLDA CALC: 40.3 % — SIGNIFICANT CHANGE UP (ref 34–44)
HGB BLD CALC-MCNC: 13.2 G/DL — LOW (ref 14–18)
HGB BLD-MCNC: 13 G/DL — SIGNIFICANT CHANGE UP (ref 12–16)
IMM GRANULOCYTES NFR BLD AUTO: 0.5 % — HIGH (ref 0.1–0.3)
LACTATE BLDV-MCNC: 2.4 MMOL/L — HIGH (ref 0.5–1.6)
LYMPHOCYTES # BLD AUTO: 1.85 K/UL — SIGNIFICANT CHANGE UP (ref 1.2–3.4)
LYMPHOCYTES # BLD AUTO: 13.7 % — LOW (ref 20.5–51.1)
MAGNESIUM SERPL-MCNC: 1.8 MG/DL — SIGNIFICANT CHANGE UP (ref 1.8–2.4)
MCHC RBC-ENTMCNC: 28.8 PG — SIGNIFICANT CHANGE UP (ref 27–31)
MCHC RBC-ENTMCNC: 33.8 G/DL — SIGNIFICANT CHANGE UP (ref 32–37)
MCV RBC AUTO: 85.4 FL — SIGNIFICANT CHANGE UP (ref 81–99)
MONOCYTES # BLD AUTO: 0.59 K/UL — SIGNIFICANT CHANGE UP (ref 0.1–0.6)
MONOCYTES NFR BLD AUTO: 4.4 % — SIGNIFICANT CHANGE UP (ref 1.7–9.3)
NEUTROPHILS # BLD AUTO: 9.53 K/UL — HIGH (ref 1.4–6.5)
NEUTROPHILS NFR BLD AUTO: 70.9 % — SIGNIFICANT CHANGE UP (ref 42.2–75.2)
NRBC # BLD: 0 /100 WBCS — SIGNIFICANT CHANGE UP (ref 0–0)
NT-PROBNP SERPL-SCNC: 165 PG/ML — SIGNIFICANT CHANGE UP (ref 0–300)
PCO2 BLDV: 44 MMHG — SIGNIFICANT CHANGE UP (ref 41–51)
PH BLDV: 7.34 — SIGNIFICANT CHANGE UP (ref 7.26–7.43)
PLATELET # BLD AUTO: 451 K/UL — HIGH (ref 130–400)
PO2 BLDV: 31 MMHG — SIGNIFICANT CHANGE UP (ref 20–40)
POTASSIUM BLDV-SCNC: 3.8 MMOL/L — SIGNIFICANT CHANGE UP (ref 3.3–5.6)
POTASSIUM SERPL-MCNC: 4 MMOL/L — SIGNIFICANT CHANGE UP (ref 3.5–5)
POTASSIUM SERPL-SCNC: 4 MMOL/L — SIGNIFICANT CHANGE UP (ref 3.5–5)
PROT SERPL-MCNC: 8.1 G/DL — HIGH (ref 6–8)
RBC # BLD: 4.51 M/UL — SIGNIFICANT CHANGE UP (ref 4.2–5.4)
RBC # FLD: 13.2 % — SIGNIFICANT CHANGE UP (ref 11.5–14.5)
SAO2 % BLDV: 60 % — SIGNIFICANT CHANGE UP
SODIUM SERPL-SCNC: 135 MMOL/L — SIGNIFICANT CHANGE UP (ref 135–146)
TROPONIN T SERPL-MCNC: <0.01 NG/ML — SIGNIFICANT CHANGE UP
WBC # BLD: 13.46 K/UL — HIGH (ref 4.8–10.8)
WBC # FLD AUTO: 13.46 K/UL — HIGH (ref 4.8–10.8)

## 2020-02-17 PROCEDURE — 71046 X-RAY EXAM CHEST 2 VIEWS: CPT | Mod: 26

## 2020-02-17 PROCEDURE — 93010 ELECTROCARDIOGRAM REPORT: CPT

## 2020-02-17 PROCEDURE — 99285 EMERGENCY DEPT VISIT HI MDM: CPT

## 2020-02-17 RX ORDER — DEXAMETHASONE 0.5 MG/5ML
10 ELIXIR ORAL ONCE
Refills: 0 | Status: COMPLETED | OUTPATIENT
Start: 2020-02-17 | End: 2020-02-17

## 2020-02-17 RX ORDER — IPRATROPIUM/ALBUTEROL SULFATE 18-103MCG
3 AEROSOL WITH ADAPTER (GRAM) INHALATION ONCE
Refills: 0 | Status: COMPLETED | OUTPATIENT
Start: 2020-02-17 | End: 2020-02-17

## 2020-02-17 RX ADMIN — Medication 10 MILLIGRAM(S): at 11:43

## 2020-02-17 RX ADMIN — Medication 3 MILLILITER(S): at 11:45

## 2020-02-17 RX ADMIN — Medication 3 MILLILITER(S): at 11:44

## 2020-02-17 NOTE — ED PROVIDER NOTE - PSH
H/O colectomy    S/P  section    S/P revision of total knee, right    S/P total knee replacement, right    S/P ureteral stent placement

## 2020-02-17 NOTE — ED PROVIDER NOTE - NSFOLLOWUPINSTRUCTIONS_ED_ALL_ED_FT
Asthma    Asthma is a condition in which the airways tighten and narrow, making it difficult to breath. Asthma episodes, also called asthma attacks, range from minor to life-threatening. Symptoms include wheezing, coughing, chest tightness, or shortness of breath. The diagnosis of asthma is made by a review of your medical history and a physical exam, but may involve additional testing. Asthma cannot be cured, but medicines and lifestyle changes can help control it. Avoid triggers of asthma which may include animal dander, pollen, mold, smoke, air pollutants, etc.     SEEK IMMEDIATE MEDICAL CARE IF YOU HAVE ANY OF THE FOLLOWING SYMPTOMS: worsening of symptoms, shortness of breath at rest, chest pain, bluish discoloration to lips or fingertips, lightheadedness/dizziness, or fever.      Please follow up with pcp and pulmonologist.

## 2020-02-17 NOTE — ED PROVIDER NOTE - PHYSICAL EXAMINATION
VITAL SIGNS: I have reviewed nursing notes and confirm.  CONSTITUTIONAL: Well-developed; well-nourished; in no acute distress. pt comfortable.  SKIN: skin exam is warm and dry, no acute rash.   HEAD: Normocephalic; atraumatic.  EYES:  EOM intact; conjunctiva and sclera clear.  ENT: No nasal discharge; airway clear. moist oral mucosa;   NECK: Supple; non tender.  CARD: S1, S2 normal; no murmurs, gallops, or rubs. Regular rate and rhythm. posterior tibial and radial pulses 2+  RESP: +wheezing and crackles. cta b/l. no use of accessory muscles. no retractions  ABD: Normal bowel sounds; soft; non-distended; non-tender; no rebound. negative psoas, rovsign's and murphys.  EXT: Normal ROM. No  cyanosis or edema.  BACK: No cva tenderness  LYMPH: No acute cervical adenopathy.  NEURO: Alert, oriented, grossly unremarkable.    PSYCH: Cooperative, appropriate.

## 2020-02-17 NOTE — ED PROVIDER NOTE - CLINICAL SUMMARY MEDICAL DECISION MAKING FREE TEXT BOX
Patient presented with dyspnea and cough x several months, worsening in the past few weeks. (+) wheezing on exam but otherwise afebrile, HD stable, very well appearing, speaking full sentences, maintaining normal O2 saturation on RA. Obtained EKG which was non-ischemic without evidence of STEMI. Obtained labs which were grossly unremarkable including no significant leukocytosis, anemia, signs of dehydration/DUSTIN, transaminitis or significant electrolyte abnormalities. Troponin negative. Chest xray negative for pneumothorax, pneumonia, widened mediastinum, evidence of rib fractures, enlarged cardiac silhouette or any other emergent pathologies. Patient treated in ED with nebs and steroids with resolution of wheezing. Patient felt much better after treatment - ambulatory without difficulty, tolerating PO. Based on the above, likely worsening seasonal asthma symptoms. Will discharge home with outpatient follow up. Patient and daughter agreeable with plan. Agrees to return to ED for any new or worsening symptoms.

## 2020-02-17 NOTE — ED PROVIDER NOTE - PATIENT PORTAL LINK FT
You can access the FollowMyHealth Patient Portal offered by [facility] by registering at the following website: [email or web address]. By joining [employer]’s FollowMyHealth portal, you will also be able to view your health information using other applications (apps) compatible with our system.

## 2020-02-17 NOTE — ED PROVIDER NOTE - CARE PROVIDER_API CALL
Prasanna Gonzalez)  Critical Care Medicine; Pulmonary Disease; Sleep Medicine  [address] [place] [address]  Phone: [phone]  Fax: [phone]  Follow Up Time:     Gerald Rivas)  Cardiology; Interventional Cardiology  [address] [place] [address]  Phone: [phone]  Fax: [phone]  Follow Up Time:

## 2020-02-17 NOTE — ED PROVIDER NOTE - PROGRESS NOTE DETAILS
pt feels well. will f/u outpatient pt feels well lung exam no wheezing; no use of accessory muscle. pt is comfortable. will f/u outpatient. discussed with pt and daughter at bedside all results.  pt reports feeling well and they have appts with cardiology and pulmonology, but asking for recommendations of new.  Daughter states they will call to move appts earlier.

## 2020-02-17 NOTE — ED PROVIDER NOTE - ATTENDING CONTRIBUTION TO CARE
79 year old female, pmhx DM, HTN, asthma, presenting with dyspnea since December (2 months) that has been worsening over the past few weeks. Per daughter at bedside, patient is scheduled for outpatient follow up with Dr. King but per daughter, patient's cough has been worsening over the past few weeks and patient has had post-tussive dyspnea. Daughter states patient has a history of this occurring in the past, usually in the winter months. Otherwise denies chest pain, fevers, nausea, vomiting, abdominal pain or any other symptoms.    Vital Signs: I have reviewed the initial vital signs.  Constitutional: NAD, well-nourished, appears stated age, no acute distress.  HEENT: Airway patent, moist MM, no erythema/swelling/deformity of oral structures. EOMI, PERRLA.  CV: regular rate, regular rhythm, well-perfused extremities, 2+ b/l DP and radial pulses equal.  Lungs: (+) bilateral wheezing but no increased WOB  ABD: NTND, no guarding or rebound, no pulsatile mass, no hernias.   MSK: Neck supple, nontender, nl ROM, no stepoff. Chest nontender. Back nontender in TLS spine or to b/l bony structures or flanks. Ext nontender, nl rom, no deformity.   INTEG: Skin warm, dry, no rash.  NEURO: A&Ox3, normal strength, nl sensation throughout, normal speech.   PSYCH: Calm, cooperative, normal affect and interaction.    Patient speaking full sentences. Will obtain EKG, labs, CXR, treat wheezing with steroids/nebs, re-eval.

## 2020-02-17 NOTE — ED PROVIDER NOTE - OBJECTIVE STATEMENT
78y/o F w/ hx of DM, HTN and asthma (one admission last year) presents for sob since dec worse with lying flat. pt followed up with pcp feb 5 who told her to follow up with dr. camp. pt with appt march 23.  no fevers or chills +cough and congestion.  no leg swelling.  no cp. 78y/o F w/ hx of DM, HTN and asthma (one admission last year) presents for sob since dec worse with lying flat. pt followed up with pcp feb 5 who told her to follow up with dr. camp. pt with appt march 23.  no fevers or chills +cough and congestion.  no leg swelling.  no cp no  hx of blood clots. no vomiting or diarrhea. albuterol improves sob. no fevers or chills.

## 2020-02-17 NOTE — ED ADULT NURSE NOTE - OBJECTIVE STATEMENT
Pt presents with difficulty breathing x few days. Pt has history of asthma. Pt wheezes and chest feels "heavy". Denies fever, cough

## 2020-02-17 NOTE — ED ADULT NURSE NOTE - NSIMPLEMENTINTERV_GEN_ALL_ED
Implemented All Fall with Harm Risk Interventions:  [place] to call system. Call bell, personal items and telephone within reach. Instruct patient to call for assistance. Room bathroom lighting operational. Non-slip footwear when patient is off stretcher. Physically safe environment: no spills, clutter or unnecessary equipment. Stretcher in lowest position, wheels locked, appropriate side rails in place. Provide visual cue, wrist band, yellow gown, etc. Monitor gait and stability. Monitor for mental status changes and reorient to person, place, and time. Review medications for side effects contributing to fall risk. Reinforce activity limits and safety measures with patient and family. Provide visual clues: red socks.

## 2020-02-17 NOTE — ED PROVIDER NOTE - PMH
Asthma    DM (diabetes mellitus)    History of colorectal cancer    HLD (hyperlipidemia)    HTN (hypertension)    Left nephrolithiasis

## 2020-02-17 NOTE — ED ADULT TRIAGE NOTE - SOURCE OF INFORMATION
Quality 131: Pain Assessment And Follow-Up: Pain assessment NOT documented as being performed, documentation the patient is not eligible for a pain assessment using a standardized tool
Quality 111:Pneumonia Vaccination Status For Older Adults: Pneumococcal Vaccination Previously Received
Detail Level: Detailed
granddaughter/Patient

## 2020-02-17 NOTE — ED ADULT TRIAGE NOTE - CHIEF COMPLAINT QUOTE
coughing and wheezing since end of december but now it is getting worse and now she is having heavy braething ; pt has history of astham; pt has wheezing in triage

## 2020-02-19 ENCOUNTER — APPOINTMENT (OUTPATIENT)
Dept: ENDOCRINOLOGY | Facility: CLINIC | Age: 80
End: 2020-02-19

## 2020-02-19 ENCOUNTER — OUTPATIENT (OUTPATIENT)
Dept: OUTPATIENT SERVICES | Facility: HOSPITAL | Age: 80
LOS: 1 days | Discharge: HOME | End: 2020-02-19

## 2020-02-19 VITALS
WEIGHT: 138 LBS | BODY MASS INDEX: 27.82 KG/M2 | SYSTOLIC BLOOD PRESSURE: 128 MMHG | DIASTOLIC BLOOD PRESSURE: 81 MMHG | HEART RATE: 85 BPM | HEIGHT: 59 IN

## 2020-02-19 DIAGNOSIS — Z90.49 ACQUIRED ABSENCE OF OTHER SPECIFIED PARTS OF DIGESTIVE TRACT: Chronic | ICD-10-CM

## 2020-02-19 DIAGNOSIS — Z96.651 PRESENCE OF RIGHT ARTIFICIAL KNEE JOINT: Chronic | ICD-10-CM

## 2020-02-19 DIAGNOSIS — Z96.0 PRESENCE OF UROGENITAL IMPLANTS: Chronic | ICD-10-CM

## 2020-02-19 DIAGNOSIS — Z98.891 HISTORY OF UTERINE SCAR FROM PREVIOUS SURGERY: Chronic | ICD-10-CM

## 2020-02-19 NOTE — PHYSICAL EXAM
[Alert] : alert [No Acute Distress] : no acute distress [Well Nourished] : well nourished [Well Developed] : well developed [EOMI] : extra ocular movement intact [Normal Sclera/Conjunctiva] : normal sclera/conjunctiva [Normal Oropharynx] : the oropharynx was normal [Thyroid Not Enlarged] : the thyroid was not enlarged [No Proptosis] : no proptosis [No Thyroid Nodules] : there were no palpable thyroid nodules [No Accessory Muscle Use] : no accessory muscle use [No Respiratory Distress] : no respiratory distress [Normal Rate] : heart rate was normal  [Clear to Auscultation] : lungs were clear to auscultation bilaterally [Normal S1, S2] : normal S1 and S2 [Regular Rhythm] : with a regular rhythm [Pedal Pulses Normal] : the pedal pulses are present [Normal Bowel Sounds] : normal bowel sounds [No Edema] : there was no peripheral edema [Not Distended] : not distended [Soft] : abdomen soft [Not Tender] : non-tender [Post Cervical Nodes] : posterior cervical nodes [Anterior Cervical Nodes] : anterior cervical nodes [No Spinal Tenderness] : no spinal tenderness [Normal] : normal and non tender [Axillary Nodes] : axillary nodes [Spine Straight] : spine straight [No Stigmata of Cushings Syndrome] : no stigmata of cushings syndrome [Normal Strength/Tone] : muscle strength and tone were normal [No Rash] : no rash [No Tremors] : no tremors [Oriented x3] : oriented to person, place, and time [Normal Reflexes] : deep tendon reflexes were 2+ and symmetric [Acanthosis Nigricans] : no acanthosis nigricans [de-identified] : uses a walker for walking.

## 2020-02-19 NOTE — HISTORY OF PRESENT ILLNESS
[FreeTextEntry1] : 80 yo f presented here for DM fu . She only takes  metformin 1000 bid and pioglitazone  at home. she lost 20 pounds over the last year and half  with diet control .

## 2020-02-19 NOTE — ASSESSMENT
[Carbohydrate Consistent Diet] : carbohydrate consistent diet [Hypoglycemia Management] : hypoglycemia management [Importance of Diet and Exercise] : importance of diet and exercise to improve glycemic control, achieve weight loss and improve cardiovascular health [Long Term Vascular Complications] : long term vascular complications of diabetes [Diabetes Foot Care] : diabetes foot care [FreeTextEntry1] : 79 year old female with a PMHx of DLD, T2DM, HTN,presented for follow up.\par \par  DM2\par - A1c 8.3 (08/2019)\par - Continue with Pioglitazone and Metformin try trulicity 0.75 mg. weekly if affordable.\par - Follow up with Podiatry and Opthalmology (referral given last visit)\par - Advised low carbohydrate and simple sugar diet\par \par

## 2020-02-20 ENCOUNTER — OUTPATIENT (OUTPATIENT)
Dept: OUTPATIENT SERVICES | Facility: HOSPITAL | Age: 80
LOS: 1 days | Discharge: HOME | End: 2020-02-20

## 2020-02-20 DIAGNOSIS — Z98.891 HISTORY OF UTERINE SCAR FROM PREVIOUS SURGERY: Chronic | ICD-10-CM

## 2020-02-20 DIAGNOSIS — Z96.651 PRESENCE OF RIGHT ARTIFICIAL KNEE JOINT: Chronic | ICD-10-CM

## 2020-02-20 DIAGNOSIS — Z96.0 PRESENCE OF UROGENITAL IMPLANTS: Chronic | ICD-10-CM

## 2020-02-20 DIAGNOSIS — K05.6 PERIODONTAL DISEASE, UNSPECIFIED: ICD-10-CM

## 2020-02-20 DIAGNOSIS — Z90.49 ACQUIRED ABSENCE OF OTHER SPECIFIED PARTS OF DIGESTIVE TRACT: Chronic | ICD-10-CM

## 2020-02-22 RX ORDER — LANCETS
EACH MISCELLANEOUS
Qty: 100 | Refills: 0 | Status: ACTIVE | COMMUNITY
Start: 2020-02-22 | End: 1900-01-01

## 2020-02-25 ENCOUNTER — APPOINTMENT (OUTPATIENT)
Dept: CARDIOLOGY | Facility: CLINIC | Age: 80
End: 2020-02-25

## 2020-02-25 VITALS
DIASTOLIC BLOOD PRESSURE: 70 MMHG | HEART RATE: 91 BPM | HEIGHT: 59 IN | BODY MASS INDEX: 26.81 KG/M2 | SYSTOLIC BLOOD PRESSURE: 125 MMHG | WEIGHT: 133 LBS

## 2020-03-04 ENCOUNTER — APPOINTMENT (OUTPATIENT)
Dept: NUTRITION | Facility: CLINIC | Age: 80
End: 2020-03-04

## 2020-03-10 ENCOUNTER — APPOINTMENT (OUTPATIENT)
Dept: PULMONOLOGY | Facility: CLINIC | Age: 80
End: 2020-03-10

## 2020-03-26 ENCOUNTER — APPOINTMENT (OUTPATIENT)
Dept: NUTRITION | Facility: CLINIC | Age: 80
End: 2020-03-26

## 2020-04-08 ENCOUNTER — APPOINTMENT (OUTPATIENT)
Dept: CARDIOLOGY | Facility: CLINIC | Age: 80
End: 2020-04-08

## 2020-05-20 ENCOUNTER — APPOINTMENT (OUTPATIENT)
Dept: ENDOCRINOLOGY | Facility: CLINIC | Age: 80
End: 2020-05-20

## 2020-07-14 ENCOUNTER — APPOINTMENT (OUTPATIENT)
Dept: PULMONOLOGY | Facility: CLINIC | Age: 80
End: 2020-07-14
Payer: MEDICARE

## 2020-07-14 ENCOUNTER — OUTPATIENT (OUTPATIENT)
Dept: OUTPATIENT SERVICES | Facility: HOSPITAL | Age: 80
LOS: 1 days | Discharge: HOME | End: 2020-07-14

## 2020-07-14 VITALS
OXYGEN SATURATION: 98 % | BODY MASS INDEX: 30.44 KG/M2 | HEIGHT: 59 IN | WEIGHT: 151 LBS | TEMPERATURE: 98.7 F | SYSTOLIC BLOOD PRESSURE: 102 MMHG | DIASTOLIC BLOOD PRESSURE: 73 MMHG | HEART RATE: 68 BPM

## 2020-07-14 DIAGNOSIS — Z96.651 PRESENCE OF RIGHT ARTIFICIAL KNEE JOINT: Chronic | ICD-10-CM

## 2020-07-14 DIAGNOSIS — Z98.891 HISTORY OF UTERINE SCAR FROM PREVIOUS SURGERY: Chronic | ICD-10-CM

## 2020-07-14 DIAGNOSIS — Z90.49 ACQUIRED ABSENCE OF OTHER SPECIFIED PARTS OF DIGESTIVE TRACT: Chronic | ICD-10-CM

## 2020-07-14 DIAGNOSIS — Z96.0 PRESENCE OF UROGENITAL IMPLANTS: Chronic | ICD-10-CM

## 2020-07-14 PROCEDURE — 99213 OFFICE O/P EST LOW 20 MIN: CPT | Mod: GC
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
## 2020-07-14 NOTE — ADDENDUM
[FreeTextEntry1] : patient with asthma now well controlled. Uses inhaler infrequently. on exam Skin normal. Neck supple without masses or thyromegaly. Trachea midline. No lymphadenopathy. Breath sounds well heard bilaterally. No JVD. Normal S1 and S2 without murmurs. Extremities without cyanosis, clubbing or edema. Told to use IHCS prn and rescue inhaler prn. follow up in 6 months. \par \par \par

## 2020-07-14 NOTE — PHYSICAL EXAM
[No Acute Distress] : no acute distress [Well Developed] : well developed [Well Nourished] : well nourished [Well-Appearing] : well-appearing [No Respiratory Distress] : no respiratory distress  [No JVD] : no jugular venous distention [No Accessory Muscle Use] : no accessory muscle use [Clear to Auscultation] : lungs were clear to auscultation bilaterally [Normal Rate] : normal rate  [Regular Rhythm] : with a regular rhythm [Normal S1, S2] : normal S1 and S2 [No Murmur] : no murmur heard [No Carotid Bruits] : no carotid bruits [No Abdominal Bruit] : a ~M bruit was not heard ~T in the abdomen [No Varicosities] : no varicosities [Pedal Pulses Present] : the pedal pulses are present [No Palpable Aorta] : no palpable aorta [No Edema] : there was no peripheral edema [Soft] : abdomen soft [No Extremity Clubbing/Cyanosis] : no extremity clubbing/cyanosis [Non Tender] : non-tender [Non-distended] : non-distended [No Masses] : no abdominal mass palpated [Normal Bowel Sounds] : normal bowel sounds [No HSM] : no HSM [No Joint Swelling] : no joint swelling [Normal] : normal gait, coordination grossly intact, no focal deficits and deep tendon reflexes were 2+ and symmetric [No Rash] : no rash [de-identified] : Speaks in full sentence

## 2020-07-14 NOTE — PLAN
[FreeTextEntry1] : 80 years old female pt with past medical hx of type 2 DM last hba1c 8.5, DLD, HTN, ? asthma and ICU admissions for acute hypoxic respiratory failure secondary to viral pneumonia came for follow up for her asthma \par \par # H/O of asthma no confirmatory PFT - Unable to follow commands in 6/2019\par Asthma is intermittent and well controlled, patient is asymptomatic, rare night time awakenings \par - Continue with albuterol as needed \par - Continue Advair as needed \par \par # follow up in 6 months, advised to call if any symptoms.

## 2020-07-14 NOTE — REVIEW OF SYSTEMS
[Fever] : no fever [Chills] : no chills [Fatigue] : no fatigue [Night Sweats] : no night sweats [Recent Change In Weight] : ~T no recent weight change [Chest Pain] : no chest pain [Palpitations] : no palpitations [Leg Claudication] : no leg claudication [Lower Ext Edema] : no lower extremity edema [Orthopnea] : no orthopnea [Shortness Of Breath] : no shortness of breath [Paroxysmal Nocturnal Dyspnea] : no paroxysmal nocturnal dyspnea [Wheezing] : no wheezing [Cough] : no cough [Abdominal Pain] : no abdominal pain [Dyspnea on Exertion] : no dyspnea on exertion [Nausea] : no nausea [Constipation] : no constipation [Vomiting] : no vomiting [Diarrhea] : diarrhea [Heartburn] : no heartburn [Melena] : no melena [Incontinence] : no incontinence [Dysuria] : no dysuria [Nocturia] : no nocturia [Poor Libido] : libido not poor [Hematuria] : no hematuria [Frequency] : no frequency [Vaginal Discharge] : no vaginal discharge [Joint Pain] : no joint pain [Dysmenorrhea] : no dysmenorrhea [Joint Swelling] : no joint swelling [Muscle Pain] : no muscle pain [Muscle Weakness] : no muscle weakness [Back Pain] : no back pain

## 2020-07-15 DIAGNOSIS — J45.909 UNSPECIFIED ASTHMA, UNCOMPLICATED: ICD-10-CM

## 2020-07-30 ENCOUNTER — APPOINTMENT (OUTPATIENT)
Dept: NUTRITION | Facility: CLINIC | Age: 80
End: 2020-07-30

## 2020-07-30 ENCOUNTER — OUTPATIENT (OUTPATIENT)
Dept: OUTPATIENT SERVICES | Facility: HOSPITAL | Age: 80
LOS: 1 days | Discharge: HOME | End: 2020-07-30

## 2020-07-30 DIAGNOSIS — Z90.49 ACQUIRED ABSENCE OF OTHER SPECIFIED PARTS OF DIGESTIVE TRACT: Chronic | ICD-10-CM

## 2020-07-30 DIAGNOSIS — Z96.0 PRESENCE OF UROGENITAL IMPLANTS: Chronic | ICD-10-CM

## 2020-07-30 DIAGNOSIS — Z98.891 HISTORY OF UTERINE SCAR FROM PREVIOUS SURGERY: Chronic | ICD-10-CM

## 2020-07-30 DIAGNOSIS — Z96.651 PRESENCE OF RIGHT ARTIFICIAL KNEE JOINT: Chronic | ICD-10-CM

## 2020-08-05 ENCOUNTER — APPOINTMENT (OUTPATIENT)
Dept: CARDIOLOGY | Facility: CLINIC | Age: 80
End: 2020-08-05
Payer: MEDICARE

## 2020-08-05 VITALS
BODY MASS INDEX: 30.44 KG/M2 | SYSTOLIC BLOOD PRESSURE: 110 MMHG | DIASTOLIC BLOOD PRESSURE: 72 MMHG | HEIGHT: 59 IN | WEIGHT: 151 LBS | HEART RATE: 60 BPM

## 2020-08-05 VITALS — TEMPERATURE: 97.6 F

## 2020-08-05 VITALS — HEIGHT: 59 IN

## 2020-08-05 DIAGNOSIS — N23 UNSPECIFIED RENAL COLIC: ICD-10-CM

## 2020-08-05 PROCEDURE — 93000 ELECTROCARDIOGRAM COMPLETE: CPT

## 2020-08-05 PROCEDURE — 99204 OFFICE O/P NEW MOD 45 MIN: CPT

## 2020-08-05 RX ORDER — METFORMIN HYDROCHLORIDE 1000 MG/1
1000 TABLET, COATED ORAL
Qty: 180 | Refills: 3 | Status: DISCONTINUED | COMMUNITY
End: 2020-08-05

## 2020-08-05 RX ORDER — DULAGLUTIDE 0.75 MG/.5ML
0.75 INJECTION, SOLUTION SUBCUTANEOUS
Qty: 1 | Refills: 5 | Status: DISCONTINUED | COMMUNITY
Start: 2020-02-19 | End: 2020-08-05

## 2020-08-05 NOTE — REVIEW OF SYSTEMS
[Chest Pain] : chest pain [Palpitations] : no palpitations [Shortness Of Breath] : shortness of breath [Joint Swelling] : joint swelling [Joint Pain] : joint pain [Cough] : cough [Negative] : Neurological

## 2020-08-05 NOTE — ASSESSMENT
[FreeTextEntry1] : The patient had recent infection requiring ICD monitoring for respiratory failure . She was said to have asthma . This has resolved and she is not wheezing although remains on inhalers. She has had CP left sided which radiates to her jaw. This is somewhat atypical as it is sharp in nature, not on exertion and short lived 1-2 minutes at a time .

## 2020-08-05 NOTE — PHYSICAL EXAM
[Normal Appearance] : normal appearance [General Appearance - Well Developed] : well developed [Well Groomed] : well groomed [Normal Conjunctiva] : the conjunctiva exhibited no abnormalities [No Deformities] : no deformities [General Appearance - Well Nourished] : well nourished [General Appearance - In No Acute Distress] : no acute distress [Normal Oral Mucosa] : normal oral mucosa [Eyelids - No Xanthelasma] : the eyelids demonstrated no xanthelasmas [No Oral Cyanosis] : no oral cyanosis [No Oral Pallor] : no oral pallor [Normal Rate] : normal [Rhythm Regular] : regular [1+] : right 1+ [No Murmur] : no murmurs heard [No Pitting Edema] : no pitting edema present [Exaggerated Use Of Accessory Muscles For Inspiration] : no accessory muscle use [Respiration, Rhythm And Depth] : normal respiratory rhythm and effort [Abdomen Soft] : soft [Auscultation Breath Sounds / Voice Sounds] : lungs were clear to auscultation bilaterally [Abdomen Tenderness] : non-tender [FreeTextEntry1] : Walks with walker  [Abdomen Mass (___ Cm)] : no abdominal mass palpated [Skin Color & Pigmentation] : normal skin color and pigmentation [] : no rash [No Xanthoma] : no  xanthoma was observed [Skin Lesions] : no skin lesions [No Venous Stasis] : no venous stasis [No Skin Ulcers] : no skin ulcer [Affect] : the affect was normal [Oriented To Time, Place, And Person] : oriented to person, place, and time [No Anxiety] : not feeling anxious [Mood] : the mood was normal

## 2020-08-12 ENCOUNTER — APPOINTMENT (OUTPATIENT)
Dept: ENDOCRINOLOGY | Facility: CLINIC | Age: 80
End: 2020-08-12

## 2020-08-12 ENCOUNTER — OUTPATIENT (OUTPATIENT)
Dept: OUTPATIENT SERVICES | Facility: HOSPITAL | Age: 80
LOS: 1 days | Discharge: HOME | End: 2020-08-12

## 2020-08-12 DIAGNOSIS — E11.65 TYPE 2 DIABETES MELLITUS WITH HYPERGLYCEMIA: ICD-10-CM

## 2020-08-12 DIAGNOSIS — Z96.0 PRESENCE OF UROGENITAL IMPLANTS: Chronic | ICD-10-CM

## 2020-08-12 DIAGNOSIS — Z96.651 PRESENCE OF RIGHT ARTIFICIAL KNEE JOINT: Chronic | ICD-10-CM

## 2020-08-12 DIAGNOSIS — Z90.49 ACQUIRED ABSENCE OF OTHER SPECIFIED PARTS OF DIGESTIVE TRACT: Chronic | ICD-10-CM

## 2020-08-12 DIAGNOSIS — Z98.891 HISTORY OF UTERINE SCAR FROM PREVIOUS SURGERY: Chronic | ICD-10-CM

## 2020-08-12 NOTE — ASSESSMENT
[Diabetes Foot Care] : diabetes foot care [FreeTextEntry1] : try stopping insulin, and switch to janumet xr. add jardiance once a day. [Long Term Vascular Complications] : long term vascular complications of diabetes [Carbohydrate Consistent Diet] : carbohydrate consistent diet [Retinopathy Screening] : Patient was referred to ophthalmology for retinopathy screening [Importance of Diet and Exercise] : importance of diet and exercise to improve glycemic control, achieve weight loss and improve cardiovascular health

## 2020-08-12 NOTE — PHYSICAL EXAM
[Alert] : alert [Well Developed] : well developed [No Acute Distress] : no acute distress [Well Nourished] : well nourished [Normal Sclera/Conjunctiva] : normal sclera/conjunctiva [Normal Oropharynx] : the oropharynx was normal [No Proptosis] : no proptosis [EOMI] : extra ocular movement intact [No Respiratory Distress] : no respiratory distress [Thyroid Not Enlarged] : the thyroid was not enlarged [No Thyroid Nodules] : no palpable thyroid nodules [Normal S1, S2] : normal S1 and S2 [No Accessory Muscle Use] : no accessory muscle use [Normal Rate] : heart rate was normal [Clear to Auscultation] : lungs were clear to auscultation bilaterally [Pedal Pulses Normal] : the pedal pulses are present [No Edema] : no peripheral edema [Regular Rhythm] : with a regular rhythm [Not Distended] : not distended [Normal Bowel Sounds] : normal bowel sounds [Not Tender] : non-tender [Normal Anterior Cervical Nodes] : no anterior cervical lymphadenopathy [Normal Posterior Cervical Nodes] : no posterior cervical lymphadenopathy [Soft] : abdomen soft [No Spinal Tenderness] : no spinal tenderness [No Stigmata of Cushings Syndrome] : no stigmata of Cushings Syndrome [Spine Straight] : spine straight [Normal Gait] : normal gait [No Rash] : no rash [Normal Strength/Tone] : muscle strength and tone were normal [Oriented x3] : oriented to person, place, and time [No Tremors] : no tremors [Normal Reflexes] : deep tendon reflexes were 2+ and symmetric [Acanthosis Nigricans] : no acanthosis nigricans

## 2020-08-22 ENCOUNTER — APPOINTMENT (OUTPATIENT)
Dept: CARDIOLOGY | Facility: CLINIC | Age: 80
End: 2020-08-22
Payer: MEDICARE

## 2020-08-22 PROCEDURE — 93306 TTE W/DOPPLER COMPLETE: CPT

## 2020-09-09 ENCOUNTER — OUTPATIENT (OUTPATIENT)
Dept: OUTPATIENT SERVICES | Facility: HOSPITAL | Age: 80
LOS: 1 days | Discharge: HOME | End: 2020-09-09
Payer: MEDICARE

## 2020-09-09 ENCOUNTER — RESULT REVIEW (OUTPATIENT)
Age: 80
End: 2020-09-09

## 2020-09-09 DIAGNOSIS — N23 UNSPECIFIED RENAL COLIC: ICD-10-CM

## 2020-09-09 DIAGNOSIS — Z96.651 PRESENCE OF RIGHT ARTIFICIAL KNEE JOINT: Chronic | ICD-10-CM

## 2020-09-09 DIAGNOSIS — Z98.891 HISTORY OF UTERINE SCAR FROM PREVIOUS SURGERY: Chronic | ICD-10-CM

## 2020-09-09 DIAGNOSIS — Z96.0 PRESENCE OF UROGENITAL IMPLANTS: Chronic | ICD-10-CM

## 2020-09-09 DIAGNOSIS — Z90.49 ACQUIRED ABSENCE OF OTHER SPECIFIED PARTS OF DIGESTIVE TRACT: Chronic | ICD-10-CM

## 2020-09-09 PROCEDURE — 78452 HT MUSCLE IMAGE SPECT MULT: CPT | Mod: 26

## 2020-09-16 ENCOUNTER — APPOINTMENT (OUTPATIENT)
Dept: NUTRITION | Facility: CLINIC | Age: 80
End: 2020-09-16

## 2020-09-16 ENCOUNTER — OUTPATIENT (OUTPATIENT)
Dept: OUTPATIENT SERVICES | Facility: HOSPITAL | Age: 80
LOS: 1 days | Discharge: HOME | End: 2020-09-16

## 2020-09-16 DIAGNOSIS — Z90.49 ACQUIRED ABSENCE OF OTHER SPECIFIED PARTS OF DIGESTIVE TRACT: Chronic | ICD-10-CM

## 2020-09-16 DIAGNOSIS — Z98.891 HISTORY OF UTERINE SCAR FROM PREVIOUS SURGERY: Chronic | ICD-10-CM

## 2020-09-16 DIAGNOSIS — Z96.651 PRESENCE OF RIGHT ARTIFICIAL KNEE JOINT: Chronic | ICD-10-CM

## 2020-09-16 DIAGNOSIS — E11.65 TYPE 2 DIABETES MELLITUS WITH HYPERGLYCEMIA: ICD-10-CM

## 2020-09-16 DIAGNOSIS — Z96.0 PRESENCE OF UROGENITAL IMPLANTS: Chronic | ICD-10-CM

## 2020-10-21 ENCOUNTER — APPOINTMENT (OUTPATIENT)
Dept: CARDIOLOGY | Facility: CLINIC | Age: 80
End: 2020-10-21

## 2020-10-26 LAB
CREAT SPEC-SCNC: 86 MG/DL
MICROALBUMIN 24H UR DL<=1MG/L-MCNC: 1.6 MG/DL
MICROALBUMIN/CREAT 24H UR-RTO: 18 MG/G

## 2020-10-29 ENCOUNTER — OUTPATIENT (OUTPATIENT)
Dept: OUTPATIENT SERVICES | Facility: HOSPITAL | Age: 80
LOS: 1 days | Discharge: HOME | End: 2020-10-29

## 2020-10-29 ENCOUNTER — APPOINTMENT (OUTPATIENT)
Dept: INTERNAL MEDICINE | Facility: CLINIC | Age: 80
End: 2020-10-29
Payer: MEDICARE

## 2020-10-29 VITALS
TEMPERATURE: 97.5 F | HEIGHT: 59 IN | SYSTOLIC BLOOD PRESSURE: 123 MMHG | HEART RATE: 76 BPM | WEIGHT: 160 LBS | BODY MASS INDEX: 32.25 KG/M2 | DIASTOLIC BLOOD PRESSURE: 77 MMHG

## 2020-10-29 DIAGNOSIS — Z98.891 HISTORY OF UTERINE SCAR FROM PREVIOUS SURGERY: Chronic | ICD-10-CM

## 2020-10-29 DIAGNOSIS — Z90.49 ACQUIRED ABSENCE OF OTHER SPECIFIED PARTS OF DIGESTIVE TRACT: Chronic | ICD-10-CM

## 2020-10-29 DIAGNOSIS — Z96.651 PRESENCE OF RIGHT ARTIFICIAL KNEE JOINT: Chronic | ICD-10-CM

## 2020-10-29 DIAGNOSIS — Z96.0 PRESENCE OF UROGENITAL IMPLANTS: Chronic | ICD-10-CM

## 2020-10-29 DIAGNOSIS — M19.90 UNSPECIFIED OSTEOARTHRITIS, UNSPECIFIED SITE: ICD-10-CM

## 2020-10-29 PROCEDURE — 99213 OFFICE O/P EST LOW 20 MIN: CPT | Mod: GC

## 2020-10-29 NOTE — ASSESSMENT
[FreeTextEntry1] : 79 year old female with a PMHx of DLD, T2DM, HTN, and suspected asthma presenting to the clinic follow up and knee pain\par \par # Left knee pain - Likely OA\par - Send for x rays \par - Tylenol PRN for pain \par \par # Left eye discharge - Now resolved \par - Likely viral infection \par - Follow up with opthalmology \par \par # Asthma - Controlled \par - on Albuterol\par - advair 100/50 1 puff two times a day\par \par # DM2 - Imprving \par - A1c 7.1% (10/2020);\par - Continue follow up with Dr. Modi, medications adjusted \par - Follow up with Podiatry and Opthalmology (referral given)\par - Advised low carbohydrate and simple sugar diet\par \par # HTN - Controlled \par - Continue Toprol, HCTZ, and Nifedipine\par \par # DLD - Stable \par - Continue Simvastatin \par - Can repeat lipid profile in 03/2020\par \par # Asthma - Controlled \par - Follow up with Dr Tony\par \par # Depression\par - Continue Sertraline\par \par # Thrombocytosis\par - Follow up with Hematology (referral given last visit)\par - Hx of being of blood thinners per patient for a short time ~15 years ago, but unsure why\par \par # Colon cancer s/p resection \par - No active problems\par - Follow up with GI for repeat colonoscopy in 10/2020\par \par # anxious depression \par - psych eval, didn’t go will refer once again\par \par HEALTH CARE MAINTENANCE\par - PNA 13/23 given.\par - 2018/2019 flu vaccine given. Will be given next week \par - Colonoscopy performed in 10/2017; referral given 10/2020 (3 years)\par - Can follow up in 6 months and prn.

## 2020-10-29 NOTE — REVIEW OF SYSTEMS
[Discharge] : discharge [Joint Pain] : joint pain [Negative] : Integumentary [Pain] : no pain [Redness] : no redness [Vision Problems] : no vision problems [FreeTextEntry9] : left knee pain

## 2020-10-29 NOTE — HISTORY OF PRESENT ILLNESS
[FreeTextEntry1] : Follow up, knee pain, eye secretions  [de-identified] : 79 year old female with a PMHx of DLD, T2DM, HTN, and suspected asthma presenting to the clinic for follow up. \par She also describes worsening left knee pain, worse with activity with stiffness of 2 minutes. No trauma. Does endorse swelling but no erythema \par She also describes left eye occasional thick secretions white in color with shutting in the morning for the past month. This is associated with UR symptoms for the past week. No fever or chills. No change in vision. No eye pain \par Her lab work is good and improving

## 2020-10-30 DIAGNOSIS — J45.909 UNSPECIFIED ASTHMA, UNCOMPLICATED: ICD-10-CM

## 2020-10-30 DIAGNOSIS — M19.90 UNSPECIFIED OSTEOARTHRITIS, UNSPECIFIED SITE: ICD-10-CM

## 2020-10-30 DIAGNOSIS — E78.5 HYPERLIPIDEMIA, UNSPECIFIED: ICD-10-CM

## 2020-10-30 DIAGNOSIS — I10 ESSENTIAL (PRIMARY) HYPERTENSION: ICD-10-CM

## 2020-10-30 DIAGNOSIS — E11.65 TYPE 2 DIABETES MELLITUS WITH HYPERGLYCEMIA: ICD-10-CM

## 2020-11-04 ENCOUNTER — APPOINTMENT (OUTPATIENT)
Dept: NUTRITION | Facility: CLINIC | Age: 80
End: 2020-11-04

## 2020-11-04 ENCOUNTER — OUTPATIENT (OUTPATIENT)
Dept: OUTPATIENT SERVICES | Facility: HOSPITAL | Age: 80
LOS: 1 days | Discharge: HOME | End: 2020-11-04

## 2020-11-04 DIAGNOSIS — Z96.651 PRESENCE OF RIGHT ARTIFICIAL KNEE JOINT: Chronic | ICD-10-CM

## 2020-11-04 DIAGNOSIS — Z96.0 PRESENCE OF UROGENITAL IMPLANTS: Chronic | ICD-10-CM

## 2020-11-04 DIAGNOSIS — Z90.49 ACQUIRED ABSENCE OF OTHER SPECIFIED PARTS OF DIGESTIVE TRACT: Chronic | ICD-10-CM

## 2020-11-04 DIAGNOSIS — Z98.891 HISTORY OF UTERINE SCAR FROM PREVIOUS SURGERY: Chronic | ICD-10-CM

## 2020-11-04 DIAGNOSIS — E11.65 TYPE 2 DIABETES MELLITUS WITH HYPERGLYCEMIA: ICD-10-CM

## 2020-11-06 LAB
ALBUMIN SERPL ELPH-MCNC: 4.4 G/DL
ALP BLD-CCNC: 65 U/L
ALT SERPL-CCNC: 10 U/L
ANION GAP SERPL CALC-SCNC: 13 MMOL/L
AST SERPL-CCNC: 15 U/L
BASOPHILS # BLD AUTO: 0.08 K/UL
BASOPHILS NFR BLD AUTO: 0.8 %
BILIRUB SERPL-MCNC: <0.2 MG/DL
BUN SERPL-MCNC: 32 MG/DL
CALCIUM SERPL-MCNC: 9.2 MG/DL
CHLORIDE SERPL-SCNC: 103 MMOL/L
CHOLEST SERPL-MCNC: 158 MG/DL
CO2 SERPL-SCNC: 24 MMOL/L
CREAT SERPL-MCNC: 1.1 MG/DL
EOSINOPHIL # BLD AUTO: 0.41 K/UL
EOSINOPHIL NFR BLD AUTO: 4.3 %
ESTIMATED AVERAGE GLUCOSE: 157 MG/DL
GLUCOSE SERPL-MCNC: 172 MG/DL
HBA1C MFR BLD HPLC: 7.1 %
HCT VFR BLD CALC: 37.5 %
HDLC SERPL-MCNC: 80 MG/DL
HGB BLD-MCNC: 11.6 G/DL
IMM GRANULOCYTES NFR BLD AUTO: 0.7 %
LDLC SERPL CALC-MCNC: 66 MG/DL
LYMPHOCYTES # BLD AUTO: 2.34 K/UL
LYMPHOCYTES NFR BLD AUTO: 24.7 %
MAN DIFF?: NORMAL
MCHC RBC-ENTMCNC: 27.8 PG
MCHC RBC-ENTMCNC: 30.9 G/DL
MCV RBC AUTO: 89.9 FL
MONOCYTES # BLD AUTO: 0.75 K/UL
MONOCYTES NFR BLD AUTO: 7.9 %
NEUTROPHILS # BLD AUTO: 5.83 K/UL
NEUTROPHILS NFR BLD AUTO: 61.6 %
NONHDLC SERPL-MCNC: 78 MG/DL
PLATELET # BLD AUTO: 441 K/UL
POTASSIUM SERPL-SCNC: 4.6 MMOL/L
PROT SERPL-MCNC: 7.5 G/DL
RBC # BLD: 4.17 M/UL
RBC # FLD: 14.3 %
SODIUM SERPL-SCNC: 140 MMOL/L
TRIGL SERPL-MCNC: 115 MG/DL
WBC # FLD AUTO: 9.48 K/UL

## 2020-11-24 ENCOUNTER — RX RENEWAL (OUTPATIENT)
Age: 80
End: 2020-11-24

## 2020-11-25 ENCOUNTER — APPOINTMENT (OUTPATIENT)
Dept: CARDIOLOGY | Facility: CLINIC | Age: 80
End: 2020-11-25

## 2020-12-01 ENCOUNTER — APPOINTMENT (OUTPATIENT)
Dept: NUTRITION | Facility: CLINIC | Age: 80
End: 2020-12-01

## 2020-12-03 ENCOUNTER — APPOINTMENT (OUTPATIENT)
Dept: CARDIOLOGY | Facility: CLINIC | Age: 80
End: 2020-12-03
Payer: MEDICARE

## 2020-12-03 VITALS — WEIGHT: 160 LBS | TEMPERATURE: 97 F | HEART RATE: 79 BPM | HEIGHT: 59 IN | BODY MASS INDEX: 32.25 KG/M2

## 2020-12-03 VITALS — DIASTOLIC BLOOD PRESSURE: 70 MMHG | SYSTOLIC BLOOD PRESSURE: 100 MMHG

## 2020-12-03 PROCEDURE — 93000 ELECTROCARDIOGRAM COMPLETE: CPT

## 2020-12-03 PROCEDURE — 99214 OFFICE O/P EST MOD 30 MIN: CPT

## 2020-12-03 PROCEDURE — 99072 ADDL SUPL MATRL&STAF TM PHE: CPT

## 2020-12-03 NOTE — REVIEW OF SYSTEMS
[Shortness Of Breath] : shortness of breath [Chest Pain] : chest pain [Cough] : cough [Joint Pain] : joint pain [Joint Swelling] : joint swelling [Negative] : Endocrine [Palpitations] : no palpitations

## 2020-12-03 NOTE — HISTORY OF PRESENT ILLNESS
[FreeTextEntry1] : The patient was admitted tp [facility] on February secondary to respiratory failure secondary to viral infection. She has been treated for Asthma. the patient has not had chest pain . Nuclear stress test was negative for ischemia . Echocardiogram shows normal LV systolic fun and mild TR and mild MR

## 2020-12-03 NOTE — PHYSICAL EXAM
[General Appearance - Well Developed] : well developed [Normal Appearance] : normal appearance [Well Groomed] : well groomed [General Appearance - Well Nourished] : well nourished [No Deformities] : no deformities [General Appearance - In No Acute Distress] : no acute distress [Normal Conjunctiva] : the conjunctiva exhibited no abnormalities [Eyelids - No Xanthelasma] : the eyelids demonstrated no xanthelasmas [Normal Oral Mucosa] : normal oral mucosa [No Oral Pallor] : no oral pallor [No Oral Cyanosis] : no oral cyanosis [Respiration, Rhythm And Depth] : normal respiratory rhythm and effort [Exaggerated Use Of Accessory Muscles For Inspiration] : no accessory muscle use [Auscultation Breath Sounds / Voice Sounds] : lungs were clear to auscultation bilaterally [Abdomen Soft] : soft [Abdomen Tenderness] : non-tender [Abdomen Mass (___ Cm)] : no abdominal mass palpated [Skin Color & Pigmentation] : normal skin color and pigmentation [] : no rash [No Venous Stasis] : no venous stasis [Skin Lesions] : no skin lesions [No Skin Ulcers] : no skin ulcer [No Xanthoma] : no  xanthoma was observed [Oriented To Time, Place, And Person] : oriented to person, place, and time [Affect] : the affect was normal [Mood] : the mood was normal [No Anxiety] : not feeling anxious [Normal Rate] : normal [Rhythm Regular] : regular [No Murmur] : no murmurs heard [1+] : left 1+ [No Pitting Edema] : no pitting edema present [FreeTextEntry1] : Walks with walker

## 2020-12-03 NOTE — ASSESSMENT
[FreeTextEntry1] : The patient has not had further chest pain . Some fatigue when doing things around the house. She had no ischemia on nuclear stress test . BP is low normal and needs to be adjusted . She is under the care of pulmonary .

## 2021-01-19 ENCOUNTER — APPOINTMENT (OUTPATIENT)
Dept: PULMONOLOGY | Facility: CLINIC | Age: 81
End: 2021-01-19

## 2021-02-05 NOTE — ED ADULT NURSE NOTE - CAS TRG GEN SKIN CONDITION
Pt ambulatory to room with steady gait. Attached to monitor, changed into gown, call bell in reach.   Agree with triage note     Warm

## 2021-04-09 ENCOUNTER — NON-APPOINTMENT (OUTPATIENT)
Age: 81
End: 2021-04-09

## 2021-04-22 ENCOUNTER — APPOINTMENT (OUTPATIENT)
Dept: CARDIOLOGY | Facility: CLINIC | Age: 81
End: 2021-04-22

## 2021-04-26 ENCOUNTER — APPOINTMENT (OUTPATIENT)
Dept: CARDIOLOGY | Facility: CLINIC | Age: 81
End: 2021-04-26
Payer: MEDICARE

## 2021-04-26 ENCOUNTER — RESULT CHARGE (OUTPATIENT)
Age: 81
End: 2021-04-26

## 2021-04-26 VITALS
WEIGHT: 159 LBS | HEART RATE: 63 BPM | HEIGHT: 59 IN | SYSTOLIC BLOOD PRESSURE: 140 MMHG | DIASTOLIC BLOOD PRESSURE: 78 MMHG | BODY MASS INDEX: 32.05 KG/M2 | TEMPERATURE: 97 F

## 2021-04-26 DIAGNOSIS — D47.3 ESSENTIAL (HEMORRHAGIC) THROMBOCYTHEMIA: ICD-10-CM

## 2021-04-26 PROCEDURE — 99072 ADDL SUPL MATRL&STAF TM PHE: CPT

## 2021-04-26 PROCEDURE — 99214 OFFICE O/P EST MOD 30 MIN: CPT

## 2021-04-26 PROCEDURE — 93000 ELECTROCARDIOGRAM COMPLETE: CPT

## 2021-04-26 NOTE — REASON FOR VISIT
[Structural Heart and Valve Disease] : structural heart and valve disease [Consultation] : a consultation regarding [Chest Pain] : chest pain [Dyspnea] : dyspnea [Hyperlipidemia] : hyperlipidemia [Hypertension] : hypertension [FreeTextEntry3] : Dr Pulliam .

## 2021-04-26 NOTE — REVIEW OF SYSTEMS
[SOB] : shortness of breath [Cough] : cough [Joint Pain] : joint pain [Negative] : ENT [Chest Discomfort] : no chest discomfort [Lower Ext Edema] : no extremity edema [Wheezing] : no wheezing [Joint Swelling] : no joint swelling

## 2021-04-26 NOTE — ASSESSMENT
[FreeTextEntry1] : The patient has had SOB . This is not thought to be cardiac in origin . . The patient has had no CP . Nuclear stress test from less than a year ago was negative for ischemia and she has normal LV systolic function  . She does have Asthma and should see pulmonary for her worsening SOB . Nifedipine was decreased at last visit . BP is boderline today .

## 2021-05-08 ENCOUNTER — INPATIENT (INPATIENT)
Facility: HOSPITAL | Age: 81
LOS: 3 days | Discharge: HOME | End: 2021-05-12
Attending: HOSPITALIST | Admitting: HOSPITALIST
Payer: MEDICARE

## 2021-05-08 VITALS
TEMPERATURE: 97 F | OXYGEN SATURATION: 96 % | SYSTOLIC BLOOD PRESSURE: 171 MMHG | DIASTOLIC BLOOD PRESSURE: 74 MMHG | HEART RATE: 89 BPM | HEIGHT: 60 IN

## 2021-05-08 DIAGNOSIS — Z96.651 PRESENCE OF RIGHT ARTIFICIAL KNEE JOINT: Chronic | ICD-10-CM

## 2021-05-08 DIAGNOSIS — Z96.0 PRESENCE OF UROGENITAL IMPLANTS: Chronic | ICD-10-CM

## 2021-05-08 DIAGNOSIS — Z90.49 ACQUIRED ABSENCE OF OTHER SPECIFIED PARTS OF DIGESTIVE TRACT: Chronic | ICD-10-CM

## 2021-05-08 DIAGNOSIS — Z98.891 HISTORY OF UTERINE SCAR FROM PREVIOUS SURGERY: Chronic | ICD-10-CM

## 2021-05-08 LAB
ALBUMIN SERPL ELPH-MCNC: 4.6 G/DL — SIGNIFICANT CHANGE UP (ref 3.5–5.2)
ALP SERPL-CCNC: 86 U/L — SIGNIFICANT CHANGE UP (ref 30–115)
ALT FLD-CCNC: 12 U/L — SIGNIFICANT CHANGE UP (ref 0–41)
ANION GAP SERPL CALC-SCNC: 15 MMOL/L — HIGH (ref 7–14)
AST SERPL-CCNC: 17 U/L — SIGNIFICANT CHANGE UP (ref 0–41)
BASOPHILS # BLD AUTO: 0.1 K/UL — SIGNIFICANT CHANGE UP (ref 0–0.2)
BASOPHILS NFR BLD AUTO: 0.9 % — SIGNIFICANT CHANGE UP (ref 0–1)
BILIRUB SERPL-MCNC: <0.2 MG/DL — SIGNIFICANT CHANGE UP (ref 0.2–1.2)
BUN SERPL-MCNC: 30 MG/DL — HIGH (ref 10–20)
CALCIUM SERPL-MCNC: 10 MG/DL — SIGNIFICANT CHANGE UP (ref 8.5–10.1)
CHLORIDE SERPL-SCNC: 101 MMOL/L — SIGNIFICANT CHANGE UP (ref 98–110)
CO2 SERPL-SCNC: 24 MMOL/L — SIGNIFICANT CHANGE UP (ref 17–32)
CREAT SERPL-MCNC: 1.2 MG/DL — SIGNIFICANT CHANGE UP (ref 0.7–1.5)
EOSINOPHIL # BLD AUTO: 1.11 K/UL — HIGH (ref 0–0.7)
EOSINOPHIL NFR BLD AUTO: 9.5 % — HIGH (ref 0–8)
GLUCOSE BLDC GLUCOMTR-MCNC: 219 MG/DL — HIGH (ref 70–99)
GLUCOSE BLDC GLUCOMTR-MCNC: 229 MG/DL — HIGH (ref 70–99)
GLUCOSE SERPL-MCNC: 141 MG/DL — HIGH (ref 70–99)
HCT VFR BLD CALC: 42.3 % — SIGNIFICANT CHANGE UP (ref 37–47)
HGB BLD-MCNC: 13.4 G/DL — SIGNIFICANT CHANGE UP (ref 12–16)
IMM GRANULOCYTES NFR BLD AUTO: 0.6 % — HIGH (ref 0.1–0.3)
LYMPHOCYTES # BLD AUTO: 19.9 % — LOW (ref 20.5–51.1)
LYMPHOCYTES # BLD AUTO: 2.32 K/UL — SIGNIFICANT CHANGE UP (ref 1.2–3.4)
MCHC RBC-ENTMCNC: 27.6 PG — SIGNIFICANT CHANGE UP (ref 27–31)
MCHC RBC-ENTMCNC: 31.7 G/DL — LOW (ref 32–37)
MCV RBC AUTO: 87 FL — SIGNIFICANT CHANGE UP (ref 81–99)
MONOCYTES # BLD AUTO: 0.81 K/UL — HIGH (ref 0.1–0.6)
MONOCYTES NFR BLD AUTO: 6.9 % — SIGNIFICANT CHANGE UP (ref 1.7–9.3)
NEUTROPHILS # BLD AUTO: 7.26 K/UL — HIGH (ref 1.4–6.5)
NEUTROPHILS NFR BLD AUTO: 62.2 % — SIGNIFICANT CHANGE UP (ref 42.2–75.2)
NRBC # BLD: 0 /100 WBCS — SIGNIFICANT CHANGE UP (ref 0–0)
NT-PROBNP SERPL-SCNC: 194 PG/ML — SIGNIFICANT CHANGE UP (ref 0–300)
PLATELET # BLD AUTO: 395 K/UL — SIGNIFICANT CHANGE UP (ref 130–400)
POTASSIUM SERPL-MCNC: 5 MMOL/L — SIGNIFICANT CHANGE UP (ref 3.5–5)
POTASSIUM SERPL-SCNC: 5 MMOL/L — SIGNIFICANT CHANGE UP (ref 3.5–5)
PROT SERPL-MCNC: 7.8 G/DL — SIGNIFICANT CHANGE UP (ref 6–8)
RBC # BLD: 4.86 M/UL — SIGNIFICANT CHANGE UP (ref 4.2–5.4)
RBC # FLD: 14.8 % — HIGH (ref 11.5–14.5)
SARS-COV-2 RNA SPEC QL NAA+PROBE: SIGNIFICANT CHANGE UP
SODIUM SERPL-SCNC: 140 MMOL/L — SIGNIFICANT CHANGE UP (ref 135–146)
TROPONIN T SERPL-MCNC: <0.01 NG/ML — SIGNIFICANT CHANGE UP
WBC # BLD: 11.67 K/UL — HIGH (ref 4.8–10.8)
WBC # FLD AUTO: 11.67 K/UL — HIGH (ref 4.8–10.8)

## 2021-05-08 PROCEDURE — 71045 X-RAY EXAM CHEST 1 VIEW: CPT | Mod: 26

## 2021-05-08 PROCEDURE — 93010 ELECTROCARDIOGRAM REPORT: CPT

## 2021-05-08 PROCEDURE — 99285 EMERGENCY DEPT VISIT HI MDM: CPT

## 2021-05-08 PROCEDURE — 99223 1ST HOSP IP/OBS HIGH 75: CPT

## 2021-05-08 RX ORDER — MAGNESIUM SULFATE 500 MG/ML
2 VIAL (ML) INJECTION ONCE
Refills: 0 | Status: COMPLETED | OUTPATIENT
Start: 2021-05-08 | End: 2021-05-08

## 2021-05-08 RX ORDER — ALBUTEROL 90 UG/1
2.5 AEROSOL, METERED ORAL ONCE
Refills: 0 | Status: COMPLETED | OUTPATIENT
Start: 2021-05-08 | End: 2021-05-08

## 2021-05-08 RX ORDER — NIFEDIPINE 30 MG
1 TABLET, EXTENDED RELEASE 24 HR ORAL
Qty: 0 | Refills: 0 | DISCHARGE

## 2021-05-08 RX ORDER — ALBUTEROL 90 UG/1
1 AEROSOL, METERED ORAL EVERY 4 HOURS
Refills: 0 | Status: DISCONTINUED | OUTPATIENT
Start: 2021-05-08 | End: 2021-05-12

## 2021-05-08 RX ORDER — PANTOPRAZOLE SODIUM 20 MG/1
40 TABLET, DELAYED RELEASE ORAL
Refills: 0 | Status: DISCONTINUED | OUTPATIENT
Start: 2021-05-08 | End: 2021-05-12

## 2021-05-08 RX ORDER — SIMVASTATIN 20 MG/1
1 TABLET, FILM COATED ORAL
Qty: 0 | Refills: 0 | DISCHARGE

## 2021-05-08 RX ORDER — IPRATROPIUM/ALBUTEROL SULFATE 18-103MCG
3 AEROSOL WITH ADAPTER (GRAM) INHALATION ONCE
Refills: 0 | Status: COMPLETED | OUTPATIENT
Start: 2021-05-08 | End: 2021-05-08

## 2021-05-08 RX ORDER — SODIUM CHLORIDE 9 MG/ML
1000 INJECTION, SOLUTION INTRAVENOUS
Refills: 0 | Status: DISCONTINUED | OUTPATIENT
Start: 2021-05-08 | End: 2021-05-12

## 2021-05-08 RX ORDER — INSULIN GLARGINE 100 [IU]/ML
20 INJECTION, SOLUTION SUBCUTANEOUS AT BEDTIME
Refills: 0 | Status: DISCONTINUED | OUTPATIENT
Start: 2021-05-08 | End: 2021-05-12

## 2021-05-08 RX ORDER — SIMVASTATIN 20 MG/1
20 TABLET, FILM COATED ORAL AT BEDTIME
Refills: 0 | Status: DISCONTINUED | OUTPATIENT
Start: 2021-05-08 | End: 2021-05-12

## 2021-05-08 RX ORDER — INSULIN LISPRO 100/ML
VIAL (ML) SUBCUTANEOUS
Refills: 0 | Status: DISCONTINUED | OUTPATIENT
Start: 2021-05-08 | End: 2021-05-12

## 2021-05-08 RX ORDER — GLUCAGON INJECTION, SOLUTION 0.5 MG/.1ML
1 INJECTION, SOLUTION SUBCUTANEOUS ONCE
Refills: 0 | Status: DISCONTINUED | OUTPATIENT
Start: 2021-05-08 | End: 2021-05-12

## 2021-05-08 RX ORDER — LOSARTAN POTASSIUM 100 MG/1
25 TABLET, FILM COATED ORAL DAILY
Refills: 0 | Status: DISCONTINUED | OUTPATIENT
Start: 2021-05-08 | End: 2021-05-12

## 2021-05-08 RX ORDER — ENOXAPARIN SODIUM 100 MG/ML
40 INJECTION SUBCUTANEOUS AT BEDTIME
Refills: 0 | Status: DISCONTINUED | OUTPATIENT
Start: 2021-05-08 | End: 2021-05-12

## 2021-05-08 RX ORDER — ALBUTEROL 90 UG/1
2.5 AEROSOL, METERED ORAL EVERY 6 HOURS
Refills: 0 | Status: DISCONTINUED | OUTPATIENT
Start: 2021-05-08 | End: 2021-05-12

## 2021-05-08 RX ORDER — SERTRALINE 25 MG/1
50 TABLET, FILM COATED ORAL DAILY
Refills: 0 | Status: DISCONTINUED | OUTPATIENT
Start: 2021-05-08 | End: 2021-05-12

## 2021-05-08 RX ORDER — DEXTROSE 50 % IN WATER 50 %
15 SYRINGE (ML) INTRAVENOUS ONCE
Refills: 0 | Status: DISCONTINUED | OUTPATIENT
Start: 2021-05-08 | End: 2021-05-12

## 2021-05-08 RX ORDER — SERTRALINE 25 MG/1
1 TABLET, FILM COATED ORAL
Qty: 0 | Refills: 0 | DISCHARGE

## 2021-05-08 RX ORDER — SITAGLIPTIN AND METFORMIN HYDROCHLORIDE 500; 50 MG/1; MG/1
2 TABLET, FILM COATED ORAL
Qty: 0 | Refills: 0 | DISCHARGE

## 2021-05-08 RX ORDER — SODIUM CHLORIDE 9 MG/ML
1000 INJECTION INTRAMUSCULAR; INTRAVENOUS; SUBCUTANEOUS ONCE
Refills: 0 | Status: COMPLETED | OUTPATIENT
Start: 2021-05-08 | End: 2021-05-08

## 2021-05-08 RX ORDER — LOSARTAN POTASSIUM 100 MG/1
1 TABLET, FILM COATED ORAL
Qty: 0 | Refills: 0 | DISCHARGE

## 2021-05-08 RX ORDER — NIFEDIPINE 30 MG
30 TABLET, EXTENDED RELEASE 24 HR ORAL DAILY
Refills: 0 | Status: DISCONTINUED | OUTPATIENT
Start: 2021-05-08 | End: 2021-05-12

## 2021-05-08 RX ORDER — PIOGLITAZONE HYDROCHLORIDE 15 MG/1
1 TABLET ORAL
Qty: 0 | Refills: 0 | DISCHARGE

## 2021-05-08 RX ORDER — DEXTROSE 50 % IN WATER 50 %
25 SYRINGE (ML) INTRAVENOUS ONCE
Refills: 0 | Status: DISCONTINUED | OUTPATIENT
Start: 2021-05-08 | End: 2021-05-12

## 2021-05-08 RX ORDER — CHLORHEXIDINE GLUCONATE 213 G/1000ML
1 SOLUTION TOPICAL
Refills: 0 | Status: DISCONTINUED | OUTPATIENT
Start: 2021-05-08 | End: 2021-05-12

## 2021-05-08 RX ORDER — ALBUTEROL 90 UG/1
2.5 AEROSOL, METERED ORAL EVERY 4 HOURS
Refills: 0 | Status: DISCONTINUED | OUTPATIENT
Start: 2021-05-08 | End: 2021-05-12

## 2021-05-08 RX ORDER — DEXTROSE 50 % IN WATER 50 %
12.5 SYRINGE (ML) INTRAVENOUS ONCE
Refills: 0 | Status: DISCONTINUED | OUTPATIENT
Start: 2021-05-08 | End: 2021-05-12

## 2021-05-08 RX ORDER — TIOTROPIUM BROMIDE 18 UG/1
1 CAPSULE ORAL; RESPIRATORY (INHALATION) DAILY
Refills: 0 | Status: DISCONTINUED | OUTPATIENT
Start: 2021-05-08 | End: 2021-05-12

## 2021-05-08 RX ORDER — METFORMIN HYDROCHLORIDE 850 MG/1
1 TABLET ORAL
Qty: 0 | Refills: 0 | DISCHARGE

## 2021-05-08 RX ORDER — HYDROCHLOROTHIAZIDE 25 MG
25 TABLET ORAL DAILY
Refills: 0 | Status: DISCONTINUED | OUTPATIENT
Start: 2021-05-08 | End: 2021-05-12

## 2021-05-08 RX ORDER — INSULIN LISPRO 100/ML
7 VIAL (ML) SUBCUTANEOUS
Refills: 0 | Status: DISCONTINUED | OUTPATIENT
Start: 2021-05-08 | End: 2021-05-12

## 2021-05-08 RX ORDER — BUDESONIDE AND FORMOTEROL FUMARATE DIHYDRATE 160; 4.5 UG/1; UG/1
2 AEROSOL RESPIRATORY (INHALATION)
Refills: 0 | Status: DISCONTINUED | OUTPATIENT
Start: 2021-05-08 | End: 2021-05-12

## 2021-05-08 RX ORDER — METOPROLOL TARTRATE 50 MG
1 TABLET ORAL
Qty: 0 | Refills: 0 | DISCHARGE

## 2021-05-08 RX ORDER — EMPAGLIFLOZIN 10 MG/1
1 TABLET, FILM COATED ORAL
Qty: 0 | Refills: 0 | DISCHARGE

## 2021-05-08 RX ORDER — IPRATROPIUM/ALBUTEROL SULFATE 18-103MCG
3 AEROSOL WITH ADAPTER (GRAM) INHALATION EVERY 6 HOURS
Refills: 0 | Status: DISCONTINUED | OUTPATIENT
Start: 2021-05-08 | End: 2021-05-08

## 2021-05-08 RX ORDER — METOPROLOL TARTRATE 50 MG
100 TABLET ORAL DAILY
Refills: 0 | Status: DISCONTINUED | OUTPATIENT
Start: 2021-05-08 | End: 2021-05-12

## 2021-05-08 RX ADMIN — ALBUTEROL 2.5 MILLIGRAM(S): 90 AEROSOL, METERED ORAL at 16:46

## 2021-05-08 RX ADMIN — Medication 2: at 18:46

## 2021-05-08 RX ADMIN — Medication 3 MILLILITER(S): at 13:50

## 2021-05-08 RX ADMIN — INSULIN GLARGINE 20 UNIT(S): 100 INJECTION, SOLUTION SUBCUTANEOUS at 23:42

## 2021-05-08 RX ADMIN — Medication 60 MILLIGRAM(S): at 18:02

## 2021-05-08 RX ADMIN — ENOXAPARIN SODIUM 40 MILLIGRAM(S): 100 INJECTION SUBCUTANEOUS at 22:38

## 2021-05-08 RX ADMIN — Medication 60 MILLIGRAM(S): at 22:37

## 2021-05-08 RX ADMIN — Medication 50 GRAM(S): at 13:50

## 2021-05-08 RX ADMIN — ALBUTEROL 2.5 MILLIGRAM(S): 90 AEROSOL, METERED ORAL at 16:45

## 2021-05-08 RX ADMIN — SODIUM CHLORIDE 1000 MILLILITER(S): 9 INJECTION INTRAMUSCULAR; INTRAVENOUS; SUBCUTANEOUS at 13:50

## 2021-05-08 RX ADMIN — Medication 125 MILLIGRAM(S): at 13:51

## 2021-05-08 RX ADMIN — LOSARTAN POTASSIUM 25 MILLIGRAM(S): 100 TABLET, FILM COATED ORAL at 22:37

## 2021-05-08 RX ADMIN — Medication 2 GRAM(S): at 15:35

## 2021-05-08 RX ADMIN — Medication 3 MILLILITER(S): at 20:22

## 2021-05-08 RX ADMIN — SODIUM CHLORIDE 1000 MILLILITER(S): 9 INJECTION INTRAMUSCULAR; INTRAVENOUS; SUBCUTANEOUS at 15:35

## 2021-05-08 RX ADMIN — Medication 7 UNIT(S): at 18:46

## 2021-05-08 NOTE — H&P ADULT - ATTENDING COMMENTS
HPI:  80 Y F with pmh of Asthma (multiple ICU admissions, never intubated), Kidney stone, DM, HTN admitted for asthma exacerbation. She reports worsening shortnes sof breath and cough for 5 days, occasionally productive. She has a baseline cough but this is increased. She denied any fevers, chills, CP, palpitations, abdo pain, diaphoresis, n/v/d, sick contacts.   She has a dog for the last 13 years, non-smoker but 47 years of secondary hand smoke exposure in the past (not currently though, her  passed away years ago).   Currently her breathing has improved significantly since arrival to the hospital, she has no complaints.     REVIEW OF SYSTEMS:  CONSTITUTIONAL:  No weakness, fevers, chills, night sweats, weight loss  EYES/ENT: No visual changes. No vertigo or dysphagia  NECK: No neck pain or stiffness  RESPIRATORY: + cough, wheezing, no hemoptysis.+ shortness of breath  CARDIOVASCULAR: No chest pain or palpitations. No lower extremity edema  GASTROINTESTINAL: No abdominal pain. No nausea, vomiting, diarrhea, or hematemesis  GENITOURINARY: No dysuria or hematuria   NEUROLOGICAL: No focal numbness or weakness  SKIN: No rashes or itching  HEMATOLOGIC: No easy bruising or prolonged bleeding.      PHYSICAL EXAM:  GENERAL: NAD, well-developed, Non-toxic, stated age   HEAD:  Atraumatic, Normocephalic  EYES: EOMI, Sclera White   NECK: Supple, No JVD  CHEST/LUNG: minimal RLL expiratory wheeze, poor global air entry; No rhonchi, or crackles. Speaking and breathing comfortably on room air, no accessory muscle usage.   HEART: Regular rate and rhythm; s1, s2, No murmurs, rubs, or gallops  ABDOMEN: Soft, Nontender, Nondistended; Bowel sounds present, No rebound or guarding noted   EXTREMITIES:  No lower extremity edema or calf tenderness to palpation.  No clubbing or cyanosis  PSYCH: AAOx3, pleasant, cooperative, not anxious  NEUROLOGY: non-focal  SKIN: No rashes or lesions      ASSESSMENT AND PLAN:  Acute asthma exacerbation: currently improving.   -Cont with Solumedrol 60 q12, albuterol neb q6 and prn q4, Symbicort, and spiriva  -Follow up daily peak flow measurement   -She need to re-establish pulmonary out patient follow up. Was following with Dr Tony.     CKD IIIb: Cr slightly higher than baseline. Trend Cr, monitor strict I/O  -If worsening then hold HCTZ, check Urine Na, Cl, urea, urine Pr:Cr, Renal US, and may need a Nephrology consult.   -Avoid nephrotoxic medications.     HTN / HLD : cont home medications     Depression: cont home medications     Diabetes: Basal bolus insulin, keep fingerstick glucose <180.     DVT ppx: Lovenox/Heparin  GI ppx: Not indicated  GOC: Full code.    My note supersedes the residents in the event of a discrepancy.

## 2021-05-08 NOTE — PATIENT PROFILE ADULT - NSPROHMDIABETHBA1C_GEN_A_NUR
Pre-Operative Diagnosis: Right groin mass [R19.09]     Post-Operative Diagnosis: Right femoral hernia      Procedure Performed:   Procedure(s):  EXPLORATION RIGHT GROIN AND FEMORAL HERNIORRAPHY     Surgeon(s) and Role:     Humphrey Uribe MD - [facility]
unknown

## 2021-05-08 NOTE — H&P ADULT - HISTORY OF PRESENT ILLNESS
80 Y F with pmh of Asthma (multiple ICU admissions, never intubated), Kidney stone, DM, HTN presents to ED with complaints of SOB and cough for 5 days. Patient states that she has been having chronic cough for a few months now, but over the last 5 days her cough has become productive of white sputum, is using her rescue inhaler more often, and is now having shortness of breath at rest. She denies orthopnea or PND, denies fevers, chills, nausea, vomiting, diarrhea. Has chest pain when she coughs. Never had COVID or the vaccine.    ED Course:  T 97.2F, P 89, /74, R 19, S 96% on RA at rest. Given magnesium sulfate, solumedrol, nebs, albuterol. Still on RA. Peak flow 180 on admission. CXR negative.

## 2021-05-08 NOTE — H&P ADULT - NSHPPHYSICALEXAM_GEN_ALL_CORE
PHYSICAL EXAM:  GENERAL: NAD, lying in bed comfortably  HEAD:  Atraumatic, Normocephalic  EYES: EOMI, PERRLA, conjunctiva and sclera clear  ENT: Moist mucous membranes  NECK: Supple, No JVD  CHEST/LUNG: diffuse bilateral wheezing/rhonchi  HEART: Tachycardic, regular rhythm; No murmurs, rubs, or gallops  ABDOMEN: Bowel sounds present; Soft, Nontender, Nondistended. No hepatomegaly  EXTREMITIES:  2+ Peripheral Pulses, brisk capillary refill. No clubbing, cyanosis, or edema  NERVOUS SYSTEM:  Alert & Oriented X3, speech clear. No deficits   MSK: FROM all 4 extremities, full and equal strength  SKIN: No rashes or lesions

## 2021-05-08 NOTE — ED PROVIDER NOTE - ATTENDING CONTRIBUTION TO CARE
80 y.o. female, PMH of HTN, asthma, DM, comes in c/o cough and wheezing since January, SOB started today. Pt takes albuterol as needed. No fever/chills, CP, abdominal pain, n/v/c/d, leg pain or swelling. No urinary symptoms. Last hospitalization for asthma in 2019. Never intubated. On exam, pt in NAD, AAOx3, head NC/AT, CN II-XII intact, lungs wheezing B/L, CV S1S2 regular, abdomen soft/NT/ND/(+)BS, ext (-) edema, motor 5/5x4, sensation intact. Will do labs, CXR, nebs/steroids and reevaluate.

## 2021-05-08 NOTE — H&P ADULT - ASSESSMENT
80 Y F with pmh of Asthma (multiple ICU admissions, never intubated), Kidney stone, DM, HTN presents to ED with complaints of SOB and cough for 5 days. Admitted for asthma exacerbation.    #Asthma exacerbation- possibly due to viral URI?  -peak flow on admission 180, continue to monitor  -saturating well on RA currently  -diffuse wheezing/rhonchi  -solumedrol 60mg q8h ivp  -duonebs q4h, albuterol prn, symbicort, spiriva  -send viral respiratory panel    #HTN  -continue HCTZ, nifedipine, toprol xl    #DM  -lantus 20 qhs, lispro 7 qac  -a1c  -avoid hypoglycemia  -sliding scale    #Depression  -continue sertraline    #DVT PPx- LVX 40mg sq qhs  #GI PPx- Protonix 40mg po qam  #Diet- DASH/TLC/CC  #CHG  #Activity- AAT  #Dispo- Acute  #Code- FULL

## 2021-05-08 NOTE — ED PROVIDER NOTE - OBJECTIVE STATEMENT
Pt is a 80 year old female with PMH Asthma, Kidney stone, DM, HTN presents to ED with complaints of SOB. Pt states for past few days as been having increased shortness of breath with a productive cough with clear mucus. Pt states has been using MDI and neb at home with some relief however only temporary. Pt states short of breath with exertion. Pt denies orthopnea. Last cardiac work up in 2021, nuc stress and echo, both nomral cardiac function. Pt states asthma triggers are change in season. + ICU admissions in past, no intubations. Denies having COVId hx and non vaccinated. Pt denies any fever, chills, bodyaches, chest pain, abdominal pain, NVCD

## 2021-05-08 NOTE — ED PROVIDER NOTE - PHYSICAL EXAMINATION
Physical Exam    Vital Signs: I have reviewed the initial vital signs.  Constitutional: well-nourished, appears stated age, no acute distress  Eyes: Conjunctiva pink, Sclera clear, PERRLA, EOMI.  Cardiovascular: S1 and S2, regular rate, regular rhythm, well-perfused extremities, radial pulses equal and 2+  Respiratory: unlabored respiratory effort, wheezing in all fields. no pursed lip. no accessory muscle use    Gastrointestinal: soft, non-tender abdomen, no pulsatile mass, normal bowl sounds  Musculoskeletal: supple neck, no lower extremity edema, no midline tenderness  Integumentary: warm, dry, no rash  Neurologic: awake, alert, cranial nerves II-XII grossly intact, extremities’ motor and sensory functions grossly intact  Psychiatric: appropriate mood, appropriate affect

## 2021-05-08 NOTE — ED ADULT NURSE NOTE - OBJECTIVE STATEMENT
Patient presents to ED with c/o SOB, cough, chest tightness. Patient states she has hx of Asthma, takes Advair daily, and Albuterol MDI q4h PRN for SOB. Patient states she used the Albuterol MDI but had no relief. Denies any fever.

## 2021-05-08 NOTE — ED PROVIDER NOTE - CLINICAL SUMMARY MEDICAL DECISION MAKING FREE TEXT BOX
80 y.o. female, PMH of HTN, asthma, DM, comes in c/o cough and wheezing since January, SOB started today. Pt takes albuterol as needed. No fever/chills, CP, abdominal pain, n/v/c/d, leg pain or swelling. No urinary symptoms. Last hospitalization for asthma in 2019. Never intubated. On exam, pt in NAD, AAOx3, head NC/AT, CN II-XII intact, lungs wheezing B/L, CV S1S2 regular, abdomen soft/NT/ND/(+)BS, ext (-) edema, motor 5/5x4, sensation intact. Labs/CXR reviewed. Pt continues to be SOB and wheezing despite nebs and steroids. Will admit.

## 2021-05-08 NOTE — H&P ADULT - NSHPLABSRESULTS_GEN_ALL_CORE
13.4   11.67 )-----------( 395      ( 08 May 2021 14:10 )             42.3     05-08    140  |  101  |  30<H>  ----------------------------<  141<H>  5.0   |  24  |  1.2    Ca    10.0      08 May 2021 14:10    TPro  7.8  /  Alb  4.6  /  TBili  <0.2  /  DBili  x   /  AST  17  /  ALT  12  /  AlkPhos  86  05-08    Troponin T, Serum: <0.01 ng/mL (05.08.21 @ 14:10)    Serum Pro-Brain Natriuretic Peptide: 194 pg/mL (05.08.21 @ 14:10)    < from: Xray Chest 1 View- PORTABLE-Urgent (05.08.21 @ 14:36) >    Impression:    No radiographic evidence of acute cardiopulmonary disease.    < end of copied text >    < from: 12 Lead ECG (05.08.21 @ 13:50) >    Diagnosis Line Normal sinus rhythm  Normal ECG    < end of copied text >

## 2021-05-09 LAB
ALBUMIN SERPL ELPH-MCNC: 4.1 G/DL — SIGNIFICANT CHANGE UP (ref 3.5–5.2)
ALP SERPL-CCNC: 77 U/L — SIGNIFICANT CHANGE UP (ref 30–115)
ALT FLD-CCNC: 11 U/L — SIGNIFICANT CHANGE UP (ref 0–41)
ANION GAP SERPL CALC-SCNC: 16 MMOL/L — HIGH (ref 7–14)
AST SERPL-CCNC: 17 U/L — SIGNIFICANT CHANGE UP (ref 0–41)
BASOPHILS # BLD AUTO: 0.03 K/UL — SIGNIFICANT CHANGE UP (ref 0–0.2)
BASOPHILS NFR BLD AUTO: 0.2 % — SIGNIFICANT CHANGE UP (ref 0–1)
BILIRUB SERPL-MCNC: <0.2 MG/DL — SIGNIFICANT CHANGE UP (ref 0.2–1.2)
BUN SERPL-MCNC: 31 MG/DL — HIGH (ref 10–20)
CALCIUM SERPL-MCNC: 9.7 MG/DL — SIGNIFICANT CHANGE UP (ref 8.5–10.1)
CHLORIDE SERPL-SCNC: 103 MMOL/L — SIGNIFICANT CHANGE UP (ref 98–110)
CO2 SERPL-SCNC: 20 MMOL/L — SIGNIFICANT CHANGE UP (ref 17–32)
CREAT SERPL-MCNC: 1 MG/DL — SIGNIFICANT CHANGE UP (ref 0.7–1.5)
EOSINOPHIL # BLD AUTO: 0 K/UL — SIGNIFICANT CHANGE UP (ref 0–0.7)
EOSINOPHIL NFR BLD AUTO: 0 % — SIGNIFICANT CHANGE UP (ref 0–8)
GLUCOSE BLDC GLUCOMTR-MCNC: 119 MG/DL — HIGH (ref 70–99)
GLUCOSE BLDC GLUCOMTR-MCNC: 151 MG/DL — HIGH (ref 70–99)
GLUCOSE BLDC GLUCOMTR-MCNC: 182 MG/DL — HIGH (ref 70–99)
GLUCOSE BLDC GLUCOMTR-MCNC: 232 MG/DL — HIGH (ref 70–99)
GLUCOSE SERPL-MCNC: 219 MG/DL — HIGH (ref 70–99)
HCT VFR BLD CALC: 39.9 % — SIGNIFICANT CHANGE UP (ref 37–47)
HGB BLD-MCNC: 12.8 G/DL — SIGNIFICANT CHANGE UP (ref 12–16)
IMM GRANULOCYTES NFR BLD AUTO: 0.9 % — HIGH (ref 0.1–0.3)
LYMPHOCYTES # BLD AUTO: 1.23 K/UL — SIGNIFICANT CHANGE UP (ref 1.2–3.4)
LYMPHOCYTES # BLD AUTO: 6.4 % — LOW (ref 20.5–51.1)
MAGNESIUM SERPL-MCNC: 2.5 MG/DL — HIGH (ref 1.8–2.4)
MCHC RBC-ENTMCNC: 27.4 PG — SIGNIFICANT CHANGE UP (ref 27–31)
MCHC RBC-ENTMCNC: 32.1 G/DL — SIGNIFICANT CHANGE UP (ref 32–37)
MCV RBC AUTO: 85.4 FL — SIGNIFICANT CHANGE UP (ref 81–99)
MONOCYTES # BLD AUTO: 0.66 K/UL — HIGH (ref 0.1–0.6)
MONOCYTES NFR BLD AUTO: 3.4 % — SIGNIFICANT CHANGE UP (ref 1.7–9.3)
NEUTROPHILS # BLD AUTO: 17.1 K/UL — HIGH (ref 1.4–6.5)
NEUTROPHILS NFR BLD AUTO: 89.1 % — HIGH (ref 42.2–75.2)
NRBC # BLD: 0 /100 WBCS — SIGNIFICANT CHANGE UP (ref 0–0)
PLATELET # BLD AUTO: 404 K/UL — HIGH (ref 130–400)
POTASSIUM SERPL-MCNC: 4.4 MMOL/L — SIGNIFICANT CHANGE UP (ref 3.5–5)
POTASSIUM SERPL-SCNC: 4.4 MMOL/L — SIGNIFICANT CHANGE UP (ref 3.5–5)
PROT SERPL-MCNC: 7.4 G/DL — SIGNIFICANT CHANGE UP (ref 6–8)
RBC # BLD: 4.67 M/UL — SIGNIFICANT CHANGE UP (ref 4.2–5.4)
RBC # FLD: 14.9 % — HIGH (ref 11.5–14.5)
SODIUM SERPL-SCNC: 139 MMOL/L — SIGNIFICANT CHANGE UP (ref 135–146)
WBC # BLD: 19.2 K/UL — HIGH (ref 4.8–10.8)
WBC # FLD AUTO: 19.2 K/UL — HIGH (ref 4.8–10.8)

## 2021-05-09 PROCEDURE — 76775 US EXAM ABDO BACK WALL LIM: CPT | Mod: 26

## 2021-05-09 PROCEDURE — 99233 SBSQ HOSP IP/OBS HIGH 50: CPT

## 2021-05-09 RX ADMIN — SIMVASTATIN 20 MILLIGRAM(S): 20 TABLET, FILM COATED ORAL at 21:28

## 2021-05-09 RX ADMIN — ALBUTEROL 2.5 MILLIGRAM(S): 90 AEROSOL, METERED ORAL at 09:01

## 2021-05-09 RX ADMIN — ENOXAPARIN SODIUM 40 MILLIGRAM(S): 100 INJECTION SUBCUTANEOUS at 21:28

## 2021-05-09 RX ADMIN — ALBUTEROL 2.5 MILLIGRAM(S): 90 AEROSOL, METERED ORAL at 19:48

## 2021-05-09 RX ADMIN — CHLORHEXIDINE GLUCONATE 1 APPLICATION(S): 213 SOLUTION TOPICAL at 05:17

## 2021-05-09 RX ADMIN — ALBUTEROL 2.5 MILLIGRAM(S): 90 AEROSOL, METERED ORAL at 15:41

## 2021-05-09 RX ADMIN — Medication 25 MILLIGRAM(S): at 05:18

## 2021-05-09 RX ADMIN — Medication 2: at 11:39

## 2021-05-09 RX ADMIN — SERTRALINE 50 MILLIGRAM(S): 25 TABLET, FILM COATED ORAL at 13:18

## 2021-05-09 RX ADMIN — Medication 1: at 08:30

## 2021-05-09 RX ADMIN — Medication 7 UNIT(S): at 17:04

## 2021-05-09 RX ADMIN — Medication 100 MILLIGRAM(S): at 05:19

## 2021-05-09 RX ADMIN — Medication 1: at 17:04

## 2021-05-09 RX ADMIN — LOSARTAN POTASSIUM 25 MILLIGRAM(S): 100 TABLET, FILM COATED ORAL at 17:04

## 2021-05-09 RX ADMIN — INSULIN GLARGINE 20 UNIT(S): 100 INJECTION, SOLUTION SUBCUTANEOUS at 21:28

## 2021-05-09 RX ADMIN — Medication 30 MILLIGRAM(S): at 05:19

## 2021-05-09 RX ADMIN — Medication 7 UNIT(S): at 11:39

## 2021-05-09 RX ADMIN — PANTOPRAZOLE SODIUM 40 MILLIGRAM(S): 20 TABLET, DELAYED RELEASE ORAL at 05:27

## 2021-05-09 RX ADMIN — Medication 7 UNIT(S): at 08:30

## 2021-05-09 RX ADMIN — Medication 60 MILLIGRAM(S): at 05:18

## 2021-05-09 NOTE — PROGRESS NOTE ADULT - SUBJECTIVE AND OBJECTIVE BOX
ACOSTA FLOOD 80y Female  MRN#: [id number]       HPI     80 Y F with pmh of Asthma (multiple ICU admissions, never intubated), Kidney stone, DM, HTN presents to ED with complaints of SOB and cough for 5 days. Patient states that she has been having chronic cough for a few months now, but over the last 5 days her cough has become productive of white sputum, is using her rescue inhaler more often, and is now having shortness of breath at rest. She denies orthopnea or PND, denies fevers, chills, nausea, vomiting, diarrhea. Has chest pain when she coughs. Never had COVID or the vaccine.    ED Course:  T 97.2F, P 89, /74, R 19, S 96% on RA at rest. Given magnesium sulfate, solumedrol, nebs, albuterol. Still on RA. Peak flow 180 on admission. CXR negative.           OBJECTIVE  PAST MEDICAL & SURGICAL HISTORY  Asthma    HTN (hypertension)    HLD (hyperlipidemia)    DM (diabetes mellitus)    History of colorectal cancer    Left nephrolithiasis    H/O colectomy    S/P total knee replacement, right    S/P revision of total knee, right    S/P ureteral stent placement    S/P  section      ALLERGIES:  No Known Drug Allergies  Peaches (Rash)    MEDICATIONS:  STANDING MEDICATIONS  ALBUTerol    0.083% 2.5 milliGRAM(s) Nebulizer every 6 hours  ALBUTerol    90 MICROgram(s) HFA Inhaler 1 Puff(s) Inhalation every 4 hours  budesonide 160 MICROgram(s)/formoterol 4.5 MICROgram(s) Inhaler 2 Puff(s) Inhalation two times a day  chlorhexidine 4% Liquid 1 Application(s) Topical <User Schedule>  dextrose 40% Gel 15 Gram(s) Oral once  dextrose 5%. 1000 milliLiter(s) IV Continuous <Continuous>  dextrose 5%. 1000 milliLiter(s) IV Continuous <Continuous>  dextrose 50% Injectable 25 Gram(s) IV Push once  dextrose 50% Injectable 12.5 Gram(s) IV Push once  dextrose 50% Injectable 25 Gram(s) IV Push once  enoxaparin Injectable 40 milliGRAM(s) SubCutaneous at bedtime  glucagon  Injectable 1 milliGRAM(s) IntraMuscular once  hydrochlorothiazide 25 milliGRAM(s) Oral daily  insulin glargine Injectable (LANTUS) 20 Unit(s) SubCutaneous at bedtime  insulin lispro (ADMELOG) corrective regimen sliding scale   SubCutaneous three times a day before meals  insulin lispro Injectable (ADMELOG) 7 Unit(s) SubCutaneous three times a day before meals  losartan 25 milliGRAM(s) Oral daily  methylPREDNISolone sodium succinate Injectable 60 milliGRAM(s) IV Push two times a day  metoprolol succinate  milliGRAM(s) Oral daily  NIFEdipine XL 30 milliGRAM(s) Oral daily  pantoprazole    Tablet 40 milliGRAM(s) Oral before breakfast  sertraline 50 milliGRAM(s) Oral daily  simvastatin 20 milliGRAM(s) Oral at bedtime  tiotropium 18 MICROgram(s) Capsule 1 Capsule(s) Inhalation daily    PRN MEDICATIONS  ALBUTerol    0.083% 2.5 milliGRAM(s) Nebulizer every 4 hours PRN      VITAL SIGNS: Last 24 Hours  T(C): 36.4 (09 May 2021 00:00), Max: 36.4 (08 May 2021 19:47)  T(F): 97.6 (09 May 2021 00:00), Max: 97.6 (09 May 2021 00:00)  HR: 92 (09 May 2021 00:00) (89 - 98)  BP: 125/53 (09 May 2021 00:00) (123/60 - 171/74)  BP(mean): --  RR: 17 (09 May 2021 00:00) (17 - 19)  SpO2: 94% (08 May 2021 19:47) (94% - 96%)    LABS:                        13.4   11.67 )-----------( 395      ( 08 May 2021 14:10 )             42.3         140  |  101  |  30<H>  ----------------------------<  141<H>  5.0   |  24  |  1.2    Ca    10.0      08 May 2021 14:10    TPro  7.8  /  Alb  4.6  /  TBili  <0.2  /  DBili  x   /  AST  17  /  ALT  12  /  AlkPhos  86            Troponin T, Serum: <0.01 ng/mL (- @ 14:10)      CARDIAC MARKERS ( 08 May 2021 14:10 )  x     / <0.01 ng/mL / x     / x     / x        RADIOLOGY  < from: Xray Chest 1 View- PORTABLE-Urgent (21 @ 14:36) >  Impression:    No radiographic evidence of acute cardiopulmonary disease.      < end of copied text >      PHYSICAL EXAM:  GENERAL: NAD, AAO x 4, 80y F  HEAD:  Atraumatic, Normocephalic  EYES: EOMI, conjunctiva clear and sclera white  NECK: Supple, No JVD  CHEST/LUNG: (+) wheezing .No crackles; No accessory muscles used  HEART: Regular rate and rhythm; No murmurs;   ABDOMEN: Soft, Nontender, Nondistended; Bowel sounds present; No guarding  EXTREMITIES:  2+ Peripheral Pulses, No cyanosis or edema  NEUROLOGY: non-focal [facility] FLOOD 80y Female  MRN#: [id number]       HPI     80 Y F with pmh of Asthma (multiple ICU admissions, never intubated), Kidney stone, DM, HTN presents to ED with complaints of SOB and cough for 5 days. Patient states that she has been having chronic cough for a few months now, but over the last 5 days her cough has become productive of white sputum, is using her rescue inhaler more often, and is now having shortness of breath at rest. She denies orthopnea or PND, denies fevers, chills, nausea, vomiting, diarrhea. Has chest pain when she coughs. Never had COVID or the vaccine.    ED Course:  T 97.2F, P 89, /74, R 19, S 96% on RA at rest. Given magnesium sulfate, solumedrol, nebs, albuterol. Still on RA. Peak flow 180 on admission. CXR negative.           OBJECTIVE  PAST MEDICAL & SURGICAL HISTORY  Asthma    HTN (hypertension)    HLD (hyperlipidemia)    DM (diabetes mellitus)    History of colorectal cancer    Left nephrolithiasis    H/O colectomy    S/P total knee replacement, right    S/P revision of total knee, right    S/P ureteral stent placement    S/P  section      ALLERGIES:  No Known Drug Allergies  Peaches (Rash)    MEDICATIONS:  STANDING MEDICATIONS  ALBUTerol    0.083% 2.5 milliGRAM(s) Nebulizer every 6 hours  ALBUTerol    90 MICROgram(s) HFA Inhaler 1 Puff(s) Inhalation every 4 hours  budesonide 160 MICROgram(s)/formoterol 4.5 MICROgram(s) Inhaler 2 Puff(s) Inhalation two times a day  chlorhexidine 4% Liquid 1 Application(s) Topical <User Schedule>  dextrose 40% Gel 15 Gram(s) Oral once  dextrose 5%. 1000 milliLiter(s) IV Continuous <Continuous>  dextrose 5%. 1000 milliLiter(s) IV Continuous <Continuous>  dextrose 50% Injectable 25 Gram(s) IV Push once  dextrose 50% Injectable 12.5 Gram(s) IV Push once  dextrose 50% Injectable 25 Gram(s) IV Push once  enoxaparin Injectable 40 milliGRAM(s) SubCutaneous at bedtime  glucagon  Injectable 1 milliGRAM(s) IntraMuscular once  hydrochlorothiazide 25 milliGRAM(s) Oral daily  insulin glargine Injectable (LANTUS) 20 Unit(s) SubCutaneous at bedtime  insulin lispro (ADMELOG) corrective regimen sliding scale   SubCutaneous three times a day before meals  insulin lispro Injectable (ADMELOG) 7 Unit(s) SubCutaneous three times a day before meals  losartan 25 milliGRAM(s) Oral daily  methylPREDNISolone sodium succinate Injectable 60 milliGRAM(s) IV Push two times a day  metoprolol succinate  milliGRAM(s) Oral daily  NIFEdipine XL 30 milliGRAM(s) Oral daily  pantoprazole    Tablet 40 milliGRAM(s) Oral before breakfast  sertraline 50 milliGRAM(s) Oral daily  simvastatin 20 milliGRAM(s) Oral at bedtime  tiotropium 18 MICROgram(s) Capsule 1 Capsule(s) Inhalation daily    PRN MEDICATIONS  ALBUTerol    0.083% 2.5 milliGRAM(s) Nebulizer every 4 hours PRN      VITAL SIGNS: Last 24 Hours  T(C): 36.4 (09 May 2021 00:00), Max: 36.4 (08 May 2021 19:47)  T(F): 97.6 (09 May 2021 00:00), Max: 97.6 (09 May 2021 00:00)  HR: 92 (09 May 2021 00:00) (89 - 98)  BP: 125/53 (09 May 2021 00:00) (123/60 - 171/74)  BP(mean): --  RR: 17 (09 May 2021 00:00) (17 - 19)  SpO2: 94% (08 May 2021 19:47) (94% - 96%)    LABS:                        13.4   11.67 )-----------( 395      ( 08 May 2021 14:10 )             42.3         140  |  101  |  30<H>  ----------------------------<  141<H>  5.0   |  24  |  1.2    Ca    10.0      08 May 2021 14:10    TPro  7.8  /  Alb  4.6  /  TBili  <0.2  /  DBili  x   /  AST  17  /  ALT  12  /  AlkPhos  86            Troponin T, Serum: <0.01 ng/mL (- @ 14:10)      CARDIAC MARKERS ( 08 May 2021 14:10 )  x     / <0.01 ng/mL / x     / x     / x        RADIOLOGY  < from: Xray Chest 1 View- PORTABLE-Urgent (21 @ 14:36) >  Impression:    No radiographic evidence of acute cardiopulmonary disease.      < end of copied text >      PHYSICAL EXAM:  GENERAL: NAD, AAO x 4, 80y F  HEAD:  Atraumatic, Normocephalic  EYES: EOMI, conjunctiva clear and sclera white  NECK: Supple, No JVD  CHEST/LUNG: CTAB , no wheeze.  .No crackles; No accessory muscles used  HEART: Regular rate and rhythm; No murmurs;   ABDOMEN: Soft, Nontender, Nondistended; Bowel sounds present; No guarding  EXTREMITIES:  2+ Peripheral Pulses, No cyanosis or edema  NEUROLOGY: non-focal [facility] FLOOD 80y Female  MRN#: [id number]       HPI     80 Y F with pmh of Asthma (multiple ICU admissions, never intubated), Kidney stone, DM, HTN presents to ED with complaints of SOB and cough for 5 days. Patient states that she has been having chronic cough for a few months now, but over the last 5 days her cough has become productive of white sputum, is using her rescue inhaler more often, and is now having shortness of breath at rest. She denies orthopnea or PND, denies fevers, chills, nausea, vomiting, diarrhea. Has chest pain when she coughs. Never had COVID or the vaccine.    ED Course:  T 97.2F, P 89, /74, R 19, S 96% on RA at rest. Given magnesium sulfate, solumedrol, nebs, albuterol. Still on RA. Peak flow 180 on admission. CXR negative.     no cp, abd pain, fever  sob improved, no cough, orthopnea, pnd      OBJECTIVE  PAST MEDICAL & SURGICAL HISTORY  Asthma    HTN (hypertension)    HLD (hyperlipidemia)    DM (diabetes mellitus)    History of colorectal cancer    Left nephrolithiasis    H/O colectomy    S/P total knee replacement, right    S/P revision of total knee, right    S/P ureteral stent placement    S/P  section      ALLERGIES:  No Known Drug Allergies  Peaches (Rash)    MEDICATIONS:  STANDING MEDICATIONS  ALBUTerol    0.083% 2.5 milliGRAM(s) Nebulizer every 6 hours  ALBUTerol    90 MICROgram(s) HFA Inhaler 1 Puff(s) Inhalation every 4 hours  budesonide 160 MICROgram(s)/formoterol 4.5 MICROgram(s) Inhaler 2 Puff(s) Inhalation two times a day  chlorhexidine 4% Liquid 1 Application(s) Topical <User Schedule>  dextrose 40% Gel 15 Gram(s) Oral once  dextrose 5%. 1000 milliLiter(s) IV Continuous <Continuous>  dextrose 5%. 1000 milliLiter(s) IV Continuous <Continuous>  dextrose 50% Injectable 25 Gram(s) IV Push once  dextrose 50% Injectable 12.5 Gram(s) IV Push once  dextrose 50% Injectable 25 Gram(s) IV Push once  enoxaparin Injectable 40 milliGRAM(s) SubCutaneous at bedtime  glucagon  Injectable 1 milliGRAM(s) IntraMuscular once  hydrochlorothiazide 25 milliGRAM(s) Oral daily  insulin glargine Injectable (LANTUS) 20 Unit(s) SubCutaneous at bedtime  insulin lispro (ADMELOG) corrective regimen sliding scale   SubCutaneous three times a day before meals  insulin lispro Injectable (ADMELOG) 7 Unit(s) SubCutaneous three times a day before meals  losartan 25 milliGRAM(s) Oral daily  methylPREDNISolone sodium succinate Injectable 60 milliGRAM(s) IV Push two times a day  metoprolol succinate  milliGRAM(s) Oral daily  NIFEdipine XL 30 milliGRAM(s) Oral daily  pantoprazole    Tablet 40 milliGRAM(s) Oral before breakfast  sertraline 50 milliGRAM(s) Oral daily  simvastatin 20 milliGRAM(s) Oral at bedtime  tiotropium 18 MICROgram(s) Capsule 1 Capsule(s) Inhalation daily    PRN MEDICATIONS  ALBUTerol    0.083% 2.5 milliGRAM(s) Nebulizer every 4 hours PRN      VITAL SIGNS: Last 24 Hours  T(C): 36.4 (09 May 2021 00:00), Max: 36.4 (08 May 2021 19:47)  T(F): 97.6 (09 May 2021 00:00), Max: 97.6 (09 May 2021 00:00)  HR: 92 (09 May 2021 00:00) (89 - 98)  BP: 125/53 (09 May 2021 00:00) (123/60 - 171/74)  BP(mean): --  RR: 17 (09 May 2021 00:00) (17 - 19)  SpO2: 94% (08 May 2021 19:47) (94% - 96%)    LABS:                        13.4   11.67 )-----------( 395      ( 08 May 2021 14:10 )             42.3         140  |  101  |  30<H>  ----------------------------<  141<H>  5.0   |  24  |  1.2    Ca    10.0      08 May 2021 14:10    TPro  7.8  /  Alb  4.6  /  TBili  <0.2  /  DBili  x   /  AST  17  /  ALT  12  /  AlkPhos  86            Troponin T, Serum: <0.01 ng/mL (21 @ 14:10)      CARDIAC MARKERS ( 08 May 2021 14:10 )  x     / <0.01 ng/mL / x     / x     / x        RADIOLOGY  < from: Xray Chest 1 View- PORTABLE-Urgent (21 @ 14:36) >  Impression:    No radiographic evidence of acute cardiopulmonary disease.      < end of copied text >      PHYSICAL EXAM:  GENERAL: NAD, AAO x 4, 80y F  HEAD:  Atraumatic, Normocephalic  EYES: EOMI, conjunctiva clear and sclera white  NECK: Supple, No JVD  CHEST/LUNG: CTAB , +end exp wheeze.  .No crackles; No accessory muscles used  HEART: Regular rate and rhythm; No murmurs;   ABDOMEN: Soft, Nontender, Nondistended; Bowel sounds present; No guarding  EXTREMITIES:  2+ Peripheral Pulses, No cyanosis or edema  NEUROLOGY: non-focal

## 2021-05-09 NOTE — PROGRESS NOTE ADULT - ATTENDING COMMENTS
#Asthma exacerbation  no hypoxia, satting well on ra; +end exp wheeze on exam  albuterol  symbicort  sprivia  change solumedrol to prednsione 40, plan for 5 days total  trend peak flow  cxr clear    #Progress Note Handoff:  Pending (specify):  Consults_________, Tests________, Test Results_______, Other____peak flow_____  Family discussion:d/w pt at bedside re: treatment plan, primary dx  Disposition: Home__x_/SNF___/Other________/Unknown at this time________ #Asthma exacerbation  no hypoxia, satting well on ra; +end exp wheeze on exam  albuterol  symbicort  sprivia  change solumedrol to prednsione 40, plan for 5 days total  trend peak flow  cxr clear  check blood gas    #Progress Note Handoff:  Pending (specify):  Consults_________, Tests________, Test Results_______, Other____peak flow_____  Family discussion:d/w pt at bedside re: treatment plan, primary dx  Disposition: Home__x_/SNF___/Other________/Unknown at this time________

## 2021-05-09 NOTE — PROGRESS NOTE ADULT - ASSESSMENT
80 Y F with pmh of Asthma (multiple ICU admissions, never intubated), Kidney stone, DM, HTN presents to ED with complaints of SOB and cough for 5 days. Admitted for asthma exacerbation.      #Asthma exacerbation- improving  -peak flow on admission 180, continue to monitor  -saturating well on RA currently  -diffuse wheezing/rhonchi  -CW solumedrol 60mg q12h ivp  -duonebs q4h, albuterol prn, symbicort, spiriva  -Daily peak flow  -send viral respiratory panel  - pulm op FU with Dr Tony.     CKD IIIb: Cr slightly higher than baseline.  - Trend Cr  -monitor strict I/O  -If worsening then hold HCTZ  -check Urine Na,  - Cl, urea, urine Pr:Cr, Renal US,   - Nephrology consult.   -Avoid nephrotoxic medications.       #HTN  -continue HCTZ, nifedipine, toprol xl    #DM  -lantus 20 qhs, lispro 7 qac  -a1c  -avoid hypoglycemia  -sliding scale    #Depression  -continue sertraline    #DVT PPx- LVX 40mg sq qhs  #GI PPx- Protonix 40mg po qam  #Diet- DASH/TLC/CC  #CHG  #Activity- AAT  #Dispo- Acute  #Code- FULL   80 Y F with pmh of Asthma (multiple ICU admissions, never intubated), Kidney stone, DM, HTN presents to ED with complaints of SOB and cough for 5 days. Admitted for asthma exacerbation.      #Asthma exacerbation- improving  -peak flow on admission 180, continue to monitor  -saturating well on RA currently  -diffuse wheezing/rhonchi  -CW solumedrol 60mg q12h ivp  -duonebs q4h, albuterol prn, symbicort, spiriva  -Daily peak flow  -send viral respiratory panel  - pulm op FU with Dr Tony.     CKD IIIb: Cr slightly higher than baseline.  - Trend Cr  -monitor strict I/O  -If worsening then hold HCTZ  -check Urine Na,  - Cl, urea, urine Pr:Cr, Renal US,   - consider  Nephrology consult if worsening.   -Avoid nephrotoxic medications.       #HTN  -continue HCTZ, nifedipine, toprol xl    #DM  -lantus 20 qhs, lispro 7 qac  -a1c  -avoid hypoglycemia  -sliding scale    #Depression  -continue sertraline    #DVT PPx- LVX 40mg sq qhs  #GI PPx- Protonix 40mg po qam  #Diet- DASH/TLC/CC  #CHG  #Activity- AAT  #Dispo- Acute  #Code- FULL   80 Y F with pmh of Asthma (multiple ICU admissions, never intubated), Kidney stone, DM, HTN presents to ED with complaints of SOB and cough for 5 days. Admitted for asthma exacerbation.      #Asthma exacerbation- improving  -peak flow on admission 180  - no longer wheezing  -CW solumedrol 60mg q12h ivp  -duonebs q4h, albuterol prn, symbicort, spiriva  -Daily peak flow  -send viral respiratory panel  - pulm op FU with Dr Tony.     CKD IIIb: Cr slightly higher than baseline.  - Trend Cr  -monitor strict I/O  -If worsening then hold HCTZ  -check Urine Na,  - Cl, urea, urine Pr:Cr, Renal US,   - consider  Nephrology consult if worsening.   -Avoid nephrotoxic medications.       #HTN  -continue HCTZ, nifedipine, toprol xl    #DM  -lantus 20 qhs, lispro 7 qac  -a1c  -avoid hypoglycemia  -sliding scale    #Depression  -continue sertraline    #DVT PPx- LVX 40mg sq qhs  #GI PPx- Protonix 40mg po qam  #Diet- DASH/TLC/CC  #CHG  #Activity- AAT  #Dispo- Acute  #Code- FULL

## 2021-05-10 ENCOUNTER — TRANSCRIPTION ENCOUNTER (OUTPATIENT)
Age: 81
End: 2021-05-10

## 2021-05-10 ENCOUNTER — APPOINTMENT (OUTPATIENT)
Dept: INTERNAL MEDICINE | Facility: CLINIC | Age: 81
End: 2021-05-10

## 2021-05-10 LAB
A1C WITH ESTIMATED AVERAGE GLUCOSE RESULT: 7.4 % — HIGH (ref 4–5.6)
ALBUMIN SERPL ELPH-MCNC: 4 G/DL — SIGNIFICANT CHANGE UP (ref 3.5–5.2)
ALP SERPL-CCNC: 69 U/L — SIGNIFICANT CHANGE UP (ref 30–115)
ALT FLD-CCNC: 12 U/L — SIGNIFICANT CHANGE UP (ref 0–41)
ANION GAP SERPL CALC-SCNC: 10 MMOL/L — SIGNIFICANT CHANGE UP (ref 7–14)
AST SERPL-CCNC: 20 U/L — SIGNIFICANT CHANGE UP (ref 0–41)
BASOPHILS # BLD AUTO: 0.07 K/UL — SIGNIFICANT CHANGE UP (ref 0–0.2)
BASOPHILS NFR BLD AUTO: 0.5 % — SIGNIFICANT CHANGE UP (ref 0–1)
BILIRUB SERPL-MCNC: 0.2 MG/DL — SIGNIFICANT CHANGE UP (ref 0.2–1.2)
BUN SERPL-MCNC: 41 MG/DL — HIGH (ref 10–20)
CALCIUM SERPL-MCNC: 9.4 MG/DL — SIGNIFICANT CHANGE UP (ref 8.5–10.1)
CHLORIDE SERPL-SCNC: 105 MMOL/L — SIGNIFICANT CHANGE UP (ref 98–110)
CO2 SERPL-SCNC: 26 MMOL/L — SIGNIFICANT CHANGE UP (ref 17–32)
COVID-19 SPIKE DOMAIN AB INTERP: NEGATIVE — SIGNIFICANT CHANGE UP
COVID-19 SPIKE DOMAIN ANTIBODY RESULT: 0.4 U/ML — SIGNIFICANT CHANGE UP
CREAT SERPL-MCNC: 1.2 MG/DL — SIGNIFICANT CHANGE UP (ref 0.7–1.5)
EOSINOPHIL # BLD AUTO: 0.37 K/UL — SIGNIFICANT CHANGE UP (ref 0–0.7)
EOSINOPHIL NFR BLD AUTO: 2.4 % — SIGNIFICANT CHANGE UP (ref 0–8)
ESTIMATED AVERAGE GLUCOSE: 166 MG/DL — HIGH (ref 68–114)
GLUCOSE BLDC GLUCOMTR-MCNC: 104 MG/DL — HIGH (ref 70–99)
GLUCOSE BLDC GLUCOMTR-MCNC: 104 MG/DL — HIGH (ref 70–99)
GLUCOSE BLDC GLUCOMTR-MCNC: 113 MG/DL — HIGH (ref 70–99)
GLUCOSE BLDC GLUCOMTR-MCNC: 125 MG/DL — HIGH (ref 70–99)
GLUCOSE BLDC GLUCOMTR-MCNC: 169 MG/DL — HIGH (ref 70–99)
GLUCOSE SERPL-MCNC: 136 MG/DL — HIGH (ref 70–99)
HCT VFR BLD CALC: 40.7 % — SIGNIFICANT CHANGE UP (ref 37–47)
HGB BLD-MCNC: 12.7 G/DL — SIGNIFICANT CHANGE UP (ref 12–16)
IMM GRANULOCYTES NFR BLD AUTO: 0.7 % — HIGH (ref 0.1–0.3)
LYMPHOCYTES # BLD AUTO: 16 % — LOW (ref 20.5–51.1)
LYMPHOCYTES # BLD AUTO: 2.45 K/UL — SIGNIFICANT CHANGE UP (ref 1.2–3.4)
MAGNESIUM SERPL-MCNC: 2.3 MG/DL — SIGNIFICANT CHANGE UP (ref 1.8–2.4)
MCHC RBC-ENTMCNC: 27.1 PG — SIGNIFICANT CHANGE UP (ref 27–31)
MCHC RBC-ENTMCNC: 31.2 G/DL — LOW (ref 32–37)
MCV RBC AUTO: 86.8 FL — SIGNIFICANT CHANGE UP (ref 81–99)
MONOCYTES # BLD AUTO: 0.76 K/UL — HIGH (ref 0.1–0.6)
MONOCYTES NFR BLD AUTO: 4.9 % — SIGNIFICANT CHANGE UP (ref 1.7–9.3)
NEUTROPHILS # BLD AUTO: 11.6 K/UL — HIGH (ref 1.4–6.5)
NEUTROPHILS NFR BLD AUTO: 75.5 % — HIGH (ref 42.2–75.2)
NRBC # BLD: 0 /100 WBCS — SIGNIFICANT CHANGE UP (ref 0–0)
PHOSPHATE SERPL-MCNC: 3 MG/DL — SIGNIFICANT CHANGE UP (ref 2.1–4.9)
PLATELET # BLD AUTO: 417 K/UL — HIGH (ref 130–400)
POTASSIUM SERPL-MCNC: 4.3 MMOL/L — SIGNIFICANT CHANGE UP (ref 3.5–5)
POTASSIUM SERPL-SCNC: 4.3 MMOL/L — SIGNIFICANT CHANGE UP (ref 3.5–5)
PROT SERPL-MCNC: 6.8 G/DL — SIGNIFICANT CHANGE UP (ref 6–8)
RBC # BLD: 4.69 M/UL — SIGNIFICANT CHANGE UP (ref 4.2–5.4)
RBC # FLD: 15.1 % — HIGH (ref 11.5–14.5)
SARS-COV-2 IGG+IGM SERPL QL IA: 0.4 U/ML — SIGNIFICANT CHANGE UP
SARS-COV-2 IGG+IGM SERPL QL IA: NEGATIVE — SIGNIFICANT CHANGE UP
SODIUM SERPL-SCNC: 141 MMOL/L — SIGNIFICANT CHANGE UP (ref 135–146)
WBC # BLD: 15.36 K/UL — HIGH (ref 4.8–10.8)
WBC # FLD AUTO: 15.36 K/UL — HIGH (ref 4.8–10.8)

## 2021-05-10 PROCEDURE — 99232 SBSQ HOSP IP/OBS MODERATE 35: CPT

## 2021-05-10 RX ORDER — GUAIFENESIN/DEXTROMETHORPHAN 600MG-30MG
10 TABLET, EXTENDED RELEASE 12 HR ORAL EVERY 4 HOURS
Refills: 0 | Status: DISCONTINUED | OUTPATIENT
Start: 2021-05-10 | End: 2021-05-12

## 2021-05-10 RX ORDER — FLUOCINONIDE/EMOLLIENT BASE 0.05 %
1 CREAM (GRAM) TOPICAL
Refills: 0 | Status: DISCONTINUED | OUTPATIENT
Start: 2021-05-10 | End: 2021-05-10

## 2021-05-10 RX ORDER — FLUOCINONIDE/EMOLLIENT BASE 0.05 %
1 CREAM (GRAM) TOPICAL
Refills: 0 | Status: DISCONTINUED | OUTPATIENT
Start: 2021-05-10 | End: 2021-05-12

## 2021-05-10 RX ADMIN — CHLORHEXIDINE GLUCONATE 1 APPLICATION(S): 213 SOLUTION TOPICAL at 05:27

## 2021-05-10 RX ADMIN — Medication 7 UNIT(S): at 11:34

## 2021-05-10 RX ADMIN — Medication 30 MILLIGRAM(S): at 05:28

## 2021-05-10 RX ADMIN — Medication 7 UNIT(S): at 16:53

## 2021-05-10 RX ADMIN — ALBUTEROL 2.5 MILLIGRAM(S): 90 AEROSOL, METERED ORAL at 14:52

## 2021-05-10 RX ADMIN — LOSARTAN POTASSIUM 25 MILLIGRAM(S): 100 TABLET, FILM COATED ORAL at 17:06

## 2021-05-10 RX ADMIN — Medication 7 UNIT(S): at 08:12

## 2021-05-10 RX ADMIN — ENOXAPARIN SODIUM 40 MILLIGRAM(S): 100 INJECTION SUBCUTANEOUS at 21:58

## 2021-05-10 RX ADMIN — SIMVASTATIN 20 MILLIGRAM(S): 20 TABLET, FILM COATED ORAL at 21:57

## 2021-05-10 RX ADMIN — PANTOPRAZOLE SODIUM 40 MILLIGRAM(S): 20 TABLET, DELAYED RELEASE ORAL at 05:27

## 2021-05-10 RX ADMIN — Medication 1: at 11:34

## 2021-05-10 RX ADMIN — ALBUTEROL 2.5 MILLIGRAM(S): 90 AEROSOL, METERED ORAL at 19:46

## 2021-05-10 RX ADMIN — INSULIN GLARGINE 20 UNIT(S): 100 INJECTION, SOLUTION SUBCUTANEOUS at 21:58

## 2021-05-10 RX ADMIN — Medication 100 MILLIGRAM(S): at 05:28

## 2021-05-10 RX ADMIN — Medication 40 MILLIGRAM(S): at 05:28

## 2021-05-10 RX ADMIN — Medication 25 MILLIGRAM(S): at 05:28

## 2021-05-10 RX ADMIN — SERTRALINE 50 MILLIGRAM(S): 25 TABLET, FILM COATED ORAL at 11:34

## 2021-05-10 NOTE — PROGRESS NOTE ADULT - ASSESSMENT
80 Y F with pmh of Asthma (multiple ICU admissions, never intubated), Kidney stone, DM, HTN presents to ED with complaints of SOB and cough for 5 days. Admitted for asthma exacerbation.    #Asthma exacerbation  - peak flow on admission 180  - Scattered wheezes b/l on exam  > C/w prednisone 40 mg PO  > C/w albuterol duonebs q4h and symbicort BID  > Daily peak flow  > F/u VRP    #CKD IIIb  - At baseline  > Trend Cr  > monitor strict I/O  > If worsening then hold HCTZ  > Avoid nephrotoxic meds     #HTN  > continue HCTZ, nifedipine, toprol xl, losartan    #DM  - A1c: 7.4  -lantus 20 qhs, lispro 7 qac  > avoid hypoglycemia  > sliding scale    #Depression  > C/w sertraline    #DVT PPx- Lovenox 40mg sq qhs  #GI PPx- Protonix 40mg po qam  #Diet- DASH/TLC/CC  #CHG  #Activity- AAT  #Dispo- Acute, pending clinical improvement  #Code- FULL

## 2021-05-10 NOTE — DISCHARGE NOTE PROVIDER - NSDCFUADDINST_GEN_ALL_CORE_FT
Finish 5 day course of steroids (2 tabs per day)  Follow up w/ pulmonary (Dr. Galvan) this week or next week.

## 2021-05-10 NOTE — DISCHARGE NOTE PROVIDER - NSDCCPCAREPLAN_GEN_ALL_CORE_FT
PRINCIPAL DISCHARGE DIAGNOSIS  Diagnosis: Asthma exacerbation  Assessment and Plan of Treatment: Bronchospasm is a narrowing of the airway that usually comes and goes. You may be at risk for bronchospasm if you have a chest cold or allergies. You may also be at risk if you are bothered by air pollution, certain medicines, cold, dry air, smoke, or strong odors. Exercise may worsen your symptoms. Bronchospasms may make it hard for you to breathe.  DISCHARGE INSTRUCTIONS:  Medicines: You may need any of the following:   •Bronchodilators help expand your airway for easier breathing. Some of these medicines may help prevent future spasms.  •Inhaled steroids help reduce swelling in your airway and soothe your breathing. These are used for long-term control.  •Anticholinergics help relax and open your airway.  •Take your medicine as directed. Contact your healthcare provider if you think your medicine is not helping or if you have side effects. Tell him or her if you are allergic to any medicine. Keep a list of the medicines, vitamins, and herbs you take. Include the amounts, and when and why you take them. Bring the list or the pill bottles to follow-up visits. Carry your medicine list with you in case of an emergency.

## 2021-05-10 NOTE — PROGRESS NOTE ADULT - SUBJECTIVE AND OBJECTIVE BOX
Today is hospital day 2d.     INTERVAL HPI / OVERNIGHT EVENTS  Patient was examined and seen at bedside.   No OVNE      PAST MEDICAL & SURGICAL HISTORY  Asthma    HTN (hypertension)    HLD (hyperlipidemia)    DM (diabetes mellitus)    History of colorectal cancer    Left nephrolithiasis    H/O colectomy    S/P total knee replacement, right    S/P revision of total knee, right    S/P ureteral stent placement    S/P  section      ALLERGIES  No Known Drug Allergies  Peaches (Rash)    MEDICATIONS  STANDING MEDICATIONS  ALBUTerol    0.083% 2.5 milliGRAM(s) Nebulizer every 6 hours  ALBUTerol    90 MICROgram(s) HFA Inhaler 1 Puff(s) Inhalation every 4 hours  budesonide 160 MICROgram(s)/formoterol 4.5 MICROgram(s) Inhaler 2 Puff(s) Inhalation two times a day  chlorhexidine 4% Liquid 1 Application(s) Topical <User Schedule>  dextrose 40% Gel 15 Gram(s) Oral once  dextrose 5%. 1000 milliLiter(s) IV Continuous <Continuous>  dextrose 5%. 1000 milliLiter(s) IV Continuous <Continuous>  dextrose 50% Injectable 25 Gram(s) IV Push once  dextrose 50% Injectable 12.5 Gram(s) IV Push once  dextrose 50% Injectable 25 Gram(s) IV Push once  enoxaparin Injectable 40 milliGRAM(s) SubCutaneous at bedtime  fluocinonide 0.05% Solution 1 Application(s) Topical two times a day  glucagon  Injectable 1 milliGRAM(s) IntraMuscular once  hydrochlorothiazide 25 milliGRAM(s) Oral daily  insulin glargine Injectable (LANTUS) 20 Unit(s) SubCutaneous at bedtime  insulin lispro (ADMELOG) corrective regimen sliding scale   SubCutaneous three times a day before meals  insulin lispro Injectable (ADMELOG) 7 Unit(s) SubCutaneous three times a day before meals  losartan 25 milliGRAM(s) Oral daily  metoprolol succinate  milliGRAM(s) Oral daily  NIFEdipine XL 30 milliGRAM(s) Oral daily  pantoprazole    Tablet 40 milliGRAM(s) Oral before breakfast  predniSONE   Tablet 40 milliGRAM(s) Oral daily  sertraline 50 milliGRAM(s) Oral daily  simvastatin 20 milliGRAM(s) Oral at bedtime  tiotropium 18 MICROgram(s) Capsule 1 Capsule(s) Inhalation daily    PRN MEDICATIONS  ALBUTerol    0.083% 2.5 milliGRAM(s) Nebulizer every 4 hours PRN  guaifenesin/dextromethorphan Oral Liquid 10 milliLiter(s) Oral every 4 hours PRN    VITALS:  T(F): 97.7  HR: 75  BP: 125/63  RR: 18  SpO2: 96%    PHYSICAL EXAM  GEN: NAD, Resting comfortably in bed  PULM: Scattered wheezes b/l  CVS: Regular rate and rhythm, S1-S2, no murmurs/rubs/gallops, +2 pulses  ABD: Soft, non-tender, non-distended, no guarding  EXT: No edema  NEURO: A&Ox3, no focal deficits    LABS                        12.7   15.36 )-----------( 417      ( 10 May 2021 07:39 )             40.7     05-10    141  |  105  |  41<H>  ----------------------------<  136<H>  4.3   |  26  |  1.2    Ca    9.4      10 May 2021 07:39  Phos  3.0     05-10  Mg     2.3     05-10    TPro  6.8  /  Alb  4.0  /  TBili  0.2  /  DBili  x   /  AST  20  /  ALT  12  /  AlkPhos  69  05-10      RADIOLOGY  < from: Xray Chest 1 View- PORTABLE-Urgent (21 @ 14:36) >  No radiographic evidence of acute cardiopulmonary disease.    < end of copied text >

## 2021-05-10 NOTE — DISCHARGE NOTE PROVIDER - CARE PROVIDER_API CALL
Jigar Tony; NISHA)  Internal Medicine; Pulmonary Disease  [address] [place] [address]  Phone: [phone]  Fax: [phone]  Follow Up Time: 1 week

## 2021-05-10 NOTE — PROGRESS NOTE ADULT - ASSESSMENT
Asthma exacerbation - slowly improving  HTN - BP well controlled  diabetes - BS at target      Plan:  continue systemic steroids/antibiotics/respiratory treatments  monitor peak flow and respiratory status  monitor BS  anticipate DC tomorrow   Asthma exacerbation - slowly improving  HTN - BP well controlled  diabetes - BS at target      Plan:  continue systemic steroids/antibiotics/respiratory treatments  monitor peak flow and respiratory status  monitor BS  anticipate DC tomorrow      Patient Hand off:  Pending - clinical improvement  Disposition - home  Disccused wtih patient she is awake, alert and oriented x 3

## 2021-05-10 NOTE — DISCHARGE NOTE PROVIDER - HOSPITAL COURSE
80 Y F with pmh of Asthma (multiple ICU admissions, never intubated), Kidney stone, DM, HTN presents to ED with complaints of SOB and cough for 5 days. Patient states that she has been having chronic cough for a few months now, but over the last 5 days her cough has become productive of white sputum, is using her rescue inhaler more often, and is now having shortness of breath at rest.  pt was admitted for COPD exacerbation, treated with IV steroid and now medically stable for discharge. 80 Y F with pmh of Asthma (multiple ICU admissions, never intubated), kidney stone, DM, HTN presents to ED with complaints of SOB and cough for 5 days. Patient states that she has been having chronic cough for a few months now, but over the last 5 days her cough has become productive of white sputum, is using her rescue inhaler more often, and is now having shortness of breath at rest.  Admitted for asthma exacerbation.  Treated with IV steroids then switched to PO and inhaled. She had improvement on peak flow from 180 to ~220 (normally ~250 at home). Pt still has end-expiratory wheezes on exam but overall feeling better compared to respiratory status on admission.  She is medically clear for d/c today and is agreeable. She will f/u w/ Pulm as o/p and continue w/ 5 day course of po steroids and will c/w her inhaled steroids.

## 2021-05-10 NOTE — DISCHARGE NOTE PROVIDER - NSDCMRMEDTOKEN_GEN_ALL_CORE_FT
Advair Diskus 250 mcg-50 mcg inhalation powder: 1 puff(s) inhaled 2 times a day  albuterol 90 mcg/inh inhalation aerosol: 1 puff(s) inhaled every 4 hours, As Needed -for shortness of breath and/or wheezing   hydroCHLOROthiazide 25 mg oral tablet: 1 tab(s) orally once a day  Janumet XR 50 mg-1000 mg oral tablet, extended release: 2 tab(s) orally once a day (in the evening)  Jardiance 25 mg oral tablet: 1 tab(s) orally once a day (in the morning)  losartan 25 mg oral tablet: 1 tab(s) orally once a day  metoprolol succinate 100 mg oral tablet, extended release: 1 tab(s) orally once a day  NIFEdipine 30 mg oral tablet, extended release: 1 tab(s) orally once a day  pioglitazone 30 mg oral tablet: 1 tab(s) orally once a day  sertraline 50 mg oral tablet: 1 tab(s) orally once a day  simvastatin 20 mg oral tablet: 1 tab(s) orally once a day (at bedtime)  tiotropium 18 mcg inhalation capsule: 1 cap(s) inhaled once a day   Advair Diskus 250 mcg-50 mcg inhalation powder: 1 puff(s) inhaled 2 times a day  albuterol 90 mcg/inh inhalation aerosol: 1 puff(s) inhaled every 4 hours, As Needed -for shortness of breath and/or wheezing   hydroCHLOROthiazide 25 mg oral tablet: 1 tab(s) orally once a day  Janumet XR 50 mg-1000 mg oral tablet, extended release: 2 tab(s) orally once a day (in the evening)  Jardiance 25 mg oral tablet: 1 tab(s) orally once a day (in the morning)  losartan 25 mg oral tablet: 1 tab(s) orally once a day  metoprolol succinate 100 mg oral tablet, extended release: 1 tab(s) orally once a day  NIFEdipine 30 mg oral tablet, extended release: 1 tab(s) orally once a day  pioglitazone 30 mg oral tablet: 1 tab(s) orally once a day  predniSONE 20 mg oral tablet: 2 tab(s) orally once a day  sertraline 50 mg oral tablet: 1 tab(s) orally once a day  simvastatin 20 mg oral tablet: 1 tab(s) orally once a day (at bedtime)  tiotropium 18 mcg inhalation capsule: 1 cap(s) inhaled once a day

## 2021-05-11 LAB
ALBUMIN SERPL ELPH-MCNC: 4 G/DL — SIGNIFICANT CHANGE UP (ref 3.5–5.2)
ALP SERPL-CCNC: 72 U/L — SIGNIFICANT CHANGE UP (ref 30–115)
ALT FLD-CCNC: 11 U/L — SIGNIFICANT CHANGE UP (ref 0–41)
ANION GAP SERPL CALC-SCNC: 13 MMOL/L — SIGNIFICANT CHANGE UP (ref 7–14)
AST SERPL-CCNC: 15 U/L — SIGNIFICANT CHANGE UP (ref 0–41)
BASOPHILS # BLD AUTO: 0.08 K/UL — SIGNIFICANT CHANGE UP (ref 0–0.2)
BASOPHILS NFR BLD AUTO: 0.6 % — SIGNIFICANT CHANGE UP (ref 0–1)
BILIRUB SERPL-MCNC: <0.2 MG/DL — SIGNIFICANT CHANGE UP (ref 0.2–1.2)
BUN SERPL-MCNC: 43 MG/DL — HIGH (ref 10–20)
CALCIUM SERPL-MCNC: 9.2 MG/DL — SIGNIFICANT CHANGE UP (ref 8.5–10.1)
CHLORIDE SERPL-SCNC: 103 MMOL/L — SIGNIFICANT CHANGE UP (ref 98–110)
CO2 SERPL-SCNC: 24 MMOL/L — SIGNIFICANT CHANGE UP (ref 17–32)
CREAT SERPL-MCNC: 1.2 MG/DL — SIGNIFICANT CHANGE UP (ref 0.7–1.5)
EOSINOPHIL # BLD AUTO: 0.43 K/UL — SIGNIFICANT CHANGE UP (ref 0–0.7)
EOSINOPHIL NFR BLD AUTO: 3.4 % — SIGNIFICANT CHANGE UP (ref 0–8)
GLUCOSE BLDC GLUCOMTR-MCNC: 103 MG/DL — HIGH (ref 70–99)
GLUCOSE BLDC GLUCOMTR-MCNC: 128 MG/DL — HIGH (ref 70–99)
GLUCOSE BLDC GLUCOMTR-MCNC: 184 MG/DL — HIGH (ref 70–99)
GLUCOSE BLDC GLUCOMTR-MCNC: 234 MG/DL — HIGH (ref 70–99)
GLUCOSE SERPL-MCNC: 117 MG/DL — HIGH (ref 70–99)
HCT VFR BLD CALC: 41.7 % — SIGNIFICANT CHANGE UP (ref 37–47)
HGB BLD-MCNC: 13.4 G/DL — SIGNIFICANT CHANGE UP (ref 12–16)
IMM GRANULOCYTES NFR BLD AUTO: 0.9 % — HIGH (ref 0.1–0.3)
LYMPHOCYTES # BLD AUTO: 31.9 % — SIGNIFICANT CHANGE UP (ref 20.5–51.1)
LYMPHOCYTES # BLD AUTO: 4.09 K/UL — HIGH (ref 1.2–3.4)
MCHC RBC-ENTMCNC: 27.7 PG — SIGNIFICANT CHANGE UP (ref 27–31)
MCHC RBC-ENTMCNC: 32.1 G/DL — SIGNIFICANT CHANGE UP (ref 32–37)
MCV RBC AUTO: 86.3 FL — SIGNIFICANT CHANGE UP (ref 81–99)
MONOCYTES # BLD AUTO: 1.19 K/UL — HIGH (ref 0.1–0.6)
MONOCYTES NFR BLD AUTO: 9.3 % — SIGNIFICANT CHANGE UP (ref 1.7–9.3)
NEUTROPHILS # BLD AUTO: 6.91 K/UL — HIGH (ref 1.4–6.5)
NEUTROPHILS NFR BLD AUTO: 53.9 % — SIGNIFICANT CHANGE UP (ref 42.2–75.2)
NRBC # BLD: 0 /100 WBCS — SIGNIFICANT CHANGE UP (ref 0–0)
PLATELET # BLD AUTO: 391 K/UL — SIGNIFICANT CHANGE UP (ref 130–400)
POTASSIUM SERPL-MCNC: 4.6 MMOL/L — SIGNIFICANT CHANGE UP (ref 3.5–5)
POTASSIUM SERPL-SCNC: 4.6 MMOL/L — SIGNIFICANT CHANGE UP (ref 3.5–5)
PROT SERPL-MCNC: 6.9 G/DL — SIGNIFICANT CHANGE UP (ref 6–8)
RBC # BLD: 4.83 M/UL — SIGNIFICANT CHANGE UP (ref 4.2–5.4)
RBC # FLD: 15 % — HIGH (ref 11.5–14.5)
SODIUM SERPL-SCNC: 140 MMOL/L — SIGNIFICANT CHANGE UP (ref 135–146)
WBC # BLD: 12.81 K/UL — HIGH (ref 4.8–10.8)
WBC # FLD AUTO: 12.81 K/UL — HIGH (ref 4.8–10.8)

## 2021-05-11 PROCEDURE — 99232 SBSQ HOSP IP/OBS MODERATE 35: CPT

## 2021-05-11 RX ADMIN — Medication 1: at 12:13

## 2021-05-11 RX ADMIN — Medication 100 MILLIGRAM(S): at 05:56

## 2021-05-11 RX ADMIN — ALBUTEROL 2.5 MILLIGRAM(S): 90 AEROSOL, METERED ORAL at 22:46

## 2021-05-11 RX ADMIN — SIMVASTATIN 20 MILLIGRAM(S): 20 TABLET, FILM COATED ORAL at 21:26

## 2021-05-11 RX ADMIN — BUDESONIDE AND FORMOTEROL FUMARATE DIHYDRATE 2 PUFF(S): 160; 4.5 AEROSOL RESPIRATORY (INHALATION) at 09:10

## 2021-05-11 RX ADMIN — ENOXAPARIN SODIUM 40 MILLIGRAM(S): 100 INJECTION SUBCUTANEOUS at 21:25

## 2021-05-11 RX ADMIN — LOSARTAN POTASSIUM 25 MILLIGRAM(S): 100 TABLET, FILM COATED ORAL at 17:41

## 2021-05-11 RX ADMIN — Medication 25 MILLIGRAM(S): at 05:58

## 2021-05-11 RX ADMIN — ALBUTEROL 2.5 MILLIGRAM(S): 90 AEROSOL, METERED ORAL at 09:07

## 2021-05-11 RX ADMIN — PANTOPRAZOLE SODIUM 40 MILLIGRAM(S): 20 TABLET, DELAYED RELEASE ORAL at 05:56

## 2021-05-11 RX ADMIN — ALBUTEROL 2.5 MILLIGRAM(S): 90 AEROSOL, METERED ORAL at 14:15

## 2021-05-11 RX ADMIN — Medication 7 UNIT(S): at 17:40

## 2021-05-11 RX ADMIN — Medication 30 MILLIGRAM(S): at 05:56

## 2021-05-11 RX ADMIN — INSULIN GLARGINE 20 UNIT(S): 100 INJECTION, SOLUTION SUBCUTANEOUS at 21:25

## 2021-05-11 RX ADMIN — Medication 1 APPLICATION(S): at 05:57

## 2021-05-11 RX ADMIN — Medication 40 MILLIGRAM(S): at 05:56

## 2021-05-11 RX ADMIN — Medication 7 UNIT(S): at 08:54

## 2021-05-11 RX ADMIN — SERTRALINE 50 MILLIGRAM(S): 25 TABLET, FILM COATED ORAL at 13:12

## 2021-05-11 RX ADMIN — Medication 1 APPLICATION(S): at 17:41

## 2021-05-11 RX ADMIN — Medication 2: at 17:39

## 2021-05-11 RX ADMIN — Medication 7 UNIT(S): at 12:16

## 2021-05-11 NOTE — PROGRESS NOTE ADULT - ASSESSMENT
Asthma exacerbation - slowly improving but still actively wheezing and SOB  HTN - BP well controlled  diabetes - BS at target      Plan:  continue systemic steroids/antibiotics/respiratory treatments  monitor peak flow and respiratory status  monitor BS  anticipate DC tomorrow      Patient Hand off:  Pending - clinical improvement  Disposition - home  Disccused wtih patient she is awake, alert and oriented x 3

## 2021-05-11 NOTE — PROGRESS NOTE ADULT - SUBJECTIVE AND OBJECTIVE BOX
patient seen and examined earlier today and discussed with medical resident.  patient still c/o SOB and wheezing.  slightly better than yesterday but still requiring frequent respiratory treatments and systemic steroids.  Vital Signs Last 24 Hrs  T(C): 35.7 (11 May 2021 08:36), Max: 35.8 (11 May 2021 00:00)  T(F): 96.2 (11 May 2021 08:36), Max: 96.5 (11 May 2021 00:00)  HR: 61 (11 May 2021 08:36) (61 - 75)  BP: 110/58 (11 May 2021 08:36) (110/58 - 125/59)  RR: 17 (11 May 2021 08:36) (17 - 18)   conj pink, no jaundice  neck no JVD. supple. thyroid not increased  lungs bilateral wheezing but not using accessory muscles.  heart regular rhythm.  no murmur or gallop   abd. obese BS pos. soft nontender  extremities no edema noticed. good skin turgor                           13.4   12.81 )-----------( 391      ( 11 May 2021 08:03 )             41.7   05-11    140  |  103  |  43<H>  ----------------------------<  117<H>  4.6   |  24  |  1.2    Ca    9.2      11 May 2021 08:03  Phos  3.0     05-10  Mg     2.3     05-10    TPro  6.9  /  Alb  4.0  /  TBili  <0.2  /  DBili  x   /  AST  15  /  ALT  11  /  AlkPhos  72  05-11  CAPILLARY BLOOD GLUCOSE      POCT Blood Glucose.: 184 mg/dL (11 May 2021 11:07)  POCT Blood Glucose.: 128 mg/dL (11 May 2021 07:52)  POCT Blood Glucose.: 113 mg/dL (10 May 2021 21:08)  POCT Blood Glucose.: 104 mg/dL (10 May 2021 16:22)  POCT Blood Glucose.: 104 mg/dL (10 May 2021 16:22)

## 2021-05-11 NOTE — PROGRESS NOTE ADULT - SUBJECTIVE AND OBJECTIVE BOX
Today is hospital day 3d.     INTERVAL HPI / OVERNIGHT EVENTS  Patient was examined and seen at bedside.   No OVNE.      PAST MEDICAL & SURGICAL HISTORY  Asthma    HTN (hypertension)    HLD (hyperlipidemia)    DM (diabetes mellitus)    History of colorectal cancer    Left nephrolithiasis    H/O colectomy    S/P total knee replacement, right    S/P revision of total knee, right    S/P ureteral stent placement    S/P  section      ALLERGIES  No Known Drug Allergies  Peaches (Rash)    MEDICATIONS  STANDING MEDICATIONS  ALBUTerol    0.083% 2.5 milliGRAM(s) Nebulizer every 6 hours  ALBUTerol    90 MICROgram(s) HFA Inhaler 1 Puff(s) Inhalation every 4 hours  budesonide 160 MICROgram(s)/formoterol 4.5 MICROgram(s) Inhaler 2 Puff(s) Inhalation two times a day  chlorhexidine 4% Liquid 1 Application(s) Topical <User Schedule>  dextrose 40% Gel 15 Gram(s) Oral once  dextrose 5%. 1000 milliLiter(s) IV Continuous <Continuous>  dextrose 5%. 1000 milliLiter(s) IV Continuous <Continuous>  dextrose 50% Injectable 25 Gram(s) IV Push once  dextrose 50% Injectable 12.5 Gram(s) IV Push once  dextrose 50% Injectable 25 Gram(s) IV Push once  enoxaparin Injectable 40 milliGRAM(s) SubCutaneous at bedtime  fluocinonide 0.05% Cream 1 Application(s) Topical two times a day  glucagon  Injectable 1 milliGRAM(s) IntraMuscular once  hydrochlorothiazide 25 milliGRAM(s) Oral daily  insulin glargine Injectable (LANTUS) 20 Unit(s) SubCutaneous at bedtime  insulin lispro (ADMELOG) corrective regimen sliding scale   SubCutaneous three times a day before meals  insulin lispro Injectable (ADMELOG) 7 Unit(s) SubCutaneous three times a day before meals  losartan 25 milliGRAM(s) Oral daily  metoprolol succinate  milliGRAM(s) Oral daily  NIFEdipine XL 30 milliGRAM(s) Oral daily  pantoprazole    Tablet 40 milliGRAM(s) Oral before breakfast  predniSONE   Tablet 40 milliGRAM(s) Oral daily  sertraline 50 milliGRAM(s) Oral daily  simvastatin 20 milliGRAM(s) Oral at bedtime  tiotropium 18 MICROgram(s) Capsule 1 Capsule(s) Inhalation daily    PRN MEDICATIONS  ALBUTerol    0.083% 2.5 milliGRAM(s) Nebulizer every 4 hours PRN  guaifenesin/dextromethorphan Oral Liquid 10 milliLiter(s) Oral every 4 hours PRN    VITALS:  T(F): 96.2  HR: 61  BP: 110/58  RR: 17  SpO2: --    PHYSICAL EXAM  GEN: NAD, Resting comfortably in bed  PULM: DIffuse/scattered wheezes b/l  CVS: Regular rate and rhythm, S1-S2, no murmurs/rubs/gallops, +2 pulses  ABD: Soft, non-tender, non-distended, no guarding  EXT: No edema  NEURO: A&Ox3, no focal deficits    LABS                        13.4   12.81 )-----------( 391      ( 11 May 2021 08:03 )             41.7     05-11    140  |  103  |  43<H>  ----------------------------<  117<H>  4.6   |  24  |  1.2    Ca    9.2      11 May 2021 08:03  Phos  3.0     05-10  Mg     2.3     05-10    TPro  6.9  /  Alb  4.0  /  TBili  <0.2  /  DBili  x   /  AST  15  /  ALT  11  /  AlkPhos  72  05-11

## 2021-05-11 NOTE — PROGRESS NOTE ADULT - ASSESSMENT
80 Y F with pmh of Asthma (multiple ICU admissions, never intubated), Kidney stone, DM, HTN presents to ED with complaints of SOB and cough for 5 days. Admitted for asthma exacerbation.    #Asthma exacerbation  - peak flow on admission: 180  - Peak flow today: 250  - Scattered wheezes b/l on exam  > C/w prednisone 40 mg PO  > C/w albuterol duonebs q4h and symbicort BID  > Daily peak flow    #CKD IIIb  - At baseline (1.2)  > Trend Cr  > monitor strict I/O  > If worsening then hold HCTZ  > Avoid nephrotoxic meds     #HTN  > continue HCTZ, nifedipine, toprol xl, losartan    #DM  - A1c: 7.4  -lantus 20 qhs, lispro 7 qac  > avoid hypoglycemia  > sliding scale    #Depression  > C/w sertraline    #DVT PPx- Lovenox 40mg sq qhs  #GI PPx- Protonix 40mg po qam  #Diet- DASH/TLC/CC  #CHG  #Activity- AAT  #Dispo- Acute, pending clinical improvement; Likely D/C tomorrow  #Code- FULL

## 2021-05-12 ENCOUNTER — TRANSCRIPTION ENCOUNTER (OUTPATIENT)
Age: 81
End: 2021-05-12

## 2021-05-12 VITALS
DIASTOLIC BLOOD PRESSURE: 69 MMHG | RESPIRATION RATE: 18 BRPM | TEMPERATURE: 99 F | HEART RATE: 76 BPM | SYSTOLIC BLOOD PRESSURE: 148 MMHG

## 2021-05-12 LAB
ANION GAP SERPL CALC-SCNC: 14 MMOL/L — SIGNIFICANT CHANGE UP (ref 7–14)
BUN SERPL-MCNC: 43 MG/DL — HIGH (ref 10–20)
CALCIUM SERPL-MCNC: 9.2 MG/DL — SIGNIFICANT CHANGE UP (ref 8.5–10.1)
CHLORIDE SERPL-SCNC: 104 MMOL/L — SIGNIFICANT CHANGE UP (ref 98–110)
CO2 SERPL-SCNC: 25 MMOL/L — SIGNIFICANT CHANGE UP (ref 17–32)
CREAT SERPL-MCNC: 1.1 MG/DL — SIGNIFICANT CHANGE UP (ref 0.7–1.5)
GLUCOSE BLDC GLUCOMTR-MCNC: 184 MG/DL — HIGH (ref 70–99)
GLUCOSE BLDC GLUCOMTR-MCNC: 199 MG/DL — HIGH (ref 70–99)
GLUCOSE BLDC GLUCOMTR-MCNC: 305 MG/DL — HIGH (ref 70–99)
GLUCOSE SERPL-MCNC: 149 MG/DL — HIGH (ref 70–99)
HCT VFR BLD CALC: 42.2 % — SIGNIFICANT CHANGE UP (ref 37–47)
HGB BLD-MCNC: 13.1 G/DL — SIGNIFICANT CHANGE UP (ref 12–16)
MCHC RBC-ENTMCNC: 26.7 PG — LOW (ref 27–31)
MCHC RBC-ENTMCNC: 31 G/DL — LOW (ref 32–37)
MCV RBC AUTO: 85.9 FL — SIGNIFICANT CHANGE UP (ref 81–99)
NRBC # BLD: 0 /100 WBCS — SIGNIFICANT CHANGE UP (ref 0–0)
PLATELET # BLD AUTO: 413 K/UL — HIGH (ref 130–400)
POTASSIUM SERPL-MCNC: 4.8 MMOL/L — SIGNIFICANT CHANGE UP (ref 3.5–5)
POTASSIUM SERPL-SCNC: 4.8 MMOL/L — SIGNIFICANT CHANGE UP (ref 3.5–5)
RBC # BLD: 4.91 M/UL — SIGNIFICANT CHANGE UP (ref 4.2–5.4)
RBC # FLD: 14.6 % — HIGH (ref 11.5–14.5)
SODIUM SERPL-SCNC: 143 MMOL/L — SIGNIFICANT CHANGE UP (ref 135–146)
WBC # BLD: 15.91 K/UL — HIGH (ref 4.8–10.8)
WBC # FLD AUTO: 15.91 K/UL — HIGH (ref 4.8–10.8)

## 2021-05-12 PROCEDURE — 99233 SBSQ HOSP IP/OBS HIGH 50: CPT

## 2021-05-12 RX ADMIN — Medication 25 MILLIGRAM(S): at 05:45

## 2021-05-12 RX ADMIN — LOSARTAN POTASSIUM 25 MILLIGRAM(S): 100 TABLET, FILM COATED ORAL at 17:03

## 2021-05-12 RX ADMIN — ALBUTEROL 2.5 MILLIGRAM(S): 90 AEROSOL, METERED ORAL at 09:14

## 2021-05-12 RX ADMIN — Medication 7 UNIT(S): at 08:08

## 2021-05-12 RX ADMIN — ALBUTEROL 2.5 MILLIGRAM(S): 90 AEROSOL, METERED ORAL at 15:40

## 2021-05-12 RX ADMIN — Medication 1: at 17:02

## 2021-05-12 RX ADMIN — Medication 100 MILLIGRAM(S): at 05:45

## 2021-05-12 RX ADMIN — Medication 7 UNIT(S): at 11:11

## 2021-05-12 RX ADMIN — Medication 4: at 11:11

## 2021-05-12 RX ADMIN — PANTOPRAZOLE SODIUM 40 MILLIGRAM(S): 20 TABLET, DELAYED RELEASE ORAL at 05:45

## 2021-05-12 RX ADMIN — Medication 30 MILLIGRAM(S): at 05:45

## 2021-05-12 RX ADMIN — Medication 40 MILLIGRAM(S): at 05:45

## 2021-05-12 RX ADMIN — BUDESONIDE AND FORMOTEROL FUMARATE DIHYDRATE 2 PUFF(S): 160; 4.5 AEROSOL RESPIRATORY (INHALATION) at 09:16

## 2021-05-12 RX ADMIN — Medication 1 APPLICATION(S): at 05:45

## 2021-05-12 RX ADMIN — Medication 1 APPLICATION(S): at 17:04

## 2021-05-12 RX ADMIN — Medication 1: at 08:08

## 2021-05-12 RX ADMIN — Medication 7 UNIT(S): at 17:02

## 2021-05-12 RX ADMIN — SERTRALINE 50 MILLIGRAM(S): 25 TABLET, FILM COATED ORAL at 11:12

## 2021-05-12 NOTE — PROGRESS NOTE ADULT - SUBJECTIVE AND OBJECTIVE BOX
<<<RESIDENT DISCHARGE NOTE>>>     ACOSTA FLOOD  MRN-[id number]    VITAL SIGNS:  T(F): 96.6 (05-12-21 @ 08:00), Max: 98 (05-11-21 @ 17:24)  HR: 69 (05-12-21 @ 08:00)  BP: 112/66 (05-12-21 @ 08:00)  SpO2: 96% (05-11-21 @ 22:51)      PHYSICAL EXAMINATION:  GEN: NAD, Resting comfortably in bed  PULM: Clear to auscultation bilaterally, No wheezes  CVS: Regular rate and rhythm, S1-S2, no murmurs  ABD: Soft, non-tender, non-distended, no guarding  EXT: No edema  NEURO: AAOx3, no focal deficits    TEST RESULTS:                        13.1   15.91 )-----------( 413      ( 12 May 2021 06:28 )             42.2       05-12    143  |  104  |  43<H>  ----------------------------<  149<H>  4.8   |  25  |  1.1    Ca    9.2      12 May 2021 06:28    TPro  6.9  /  Alb  4.0  /  TBili  <0.2  /  DBili  x   /  AST  15  /  ALT  11  /  AlkPhos  72  05-11      FINAL DISCHARGE INTERVIEW:  Resident(s) Present: Dr. Gurinder Grimaldo    DISCHARGE MEDICATION RECONCILIATION  reviewed with Attending (Name: Dr. Schaeffer)    DISPOSITION:   [ x ] Home,    [  ] Home with Visiting Nursing Services,   [  ]  SNF/ NH,    [  ] Acute Rehab (4A),   [  ] Other (Specify:_________)

## 2021-05-12 NOTE — DISCHARGE NOTE NURSING/CASE MANAGEMENT/SOCIAL WORK - PATIENT PORTAL LINK FT
You can access the FollowMyHealth Patient Portal offered by [facility] by registering at the following website: [email or web address]. By joining [facility] ([facility]’s FollowMyHealth portal, you will also be able to view your health information using other applications (apps) compatible with our system.

## 2021-05-12 NOTE — PROGRESS NOTE ADULT - ATTENDING COMMENTS
***My note supersedes any discrepancies that are above in the resident's note**    80 Y F with pmh of Asthma (multiple ICU admissions, never intubated), Kidney stone, DM, HTN presents to ED with complaints of SOB and cough for 5 days. Admitted for asthma exacerbation. Noted that she is compliant with her maintenance medications at home. Unsure etiology of exacerbation. Symptomatically improved with steroids and breathing treatments. Ambulating well without any dyspnea. Does endorse audible wheezing that is persistent but overall improving. Will discharge home with 5 additional days of oral prednisone, continuing of home meds, and outpatient follow up with her Pulmonologist for reassessment.

## 2021-05-13 ENCOUNTER — APPOINTMENT (OUTPATIENT)
Dept: CARE COORDINATION | Facility: HOME HEALTH | Age: 81
End: 2021-05-13
Payer: MEDICARE

## 2021-05-13 PROCEDURE — 99348 HOME/RES VST EST LOW MDM 30: CPT | Mod: 95

## 2021-05-13 NOTE — PLAN
[FreeTextEntry1] : asthma- c/w maintenance inhalers Advair , Spiriva  , prednisone , albuterol prn \par DM- low carb diet , BS monitoring c/w current medication regimen\par HTN- c/w continue HCTZ, nifedipine, BB,  losartan\par PCP/pulmonary follow up

## 2021-05-13 NOTE — COUNSELING
[de-identified] : Pt was informed about CN’s role/ HF TCM program and overview of transitional care reviewed with patient. Pt educated on topics of importance such as compliance with prescribed medication regimen,  action plan and escalation process, adequate hydration, and proper diet. Pt encouraged calling CN with any issues, concerns or questions, also educated to notify CN if experiencing CP, SOB, cough, increased mucus production, increased use of rescue inhaler, fever, chills, fatigue, “chest cold symptoms” dizziness, lightheadedness, n/v/d/c, swelling to extremities and/or any signs of asthma exacerbation/flare as reviewed. Reassurance provided. Will continue to monitor\par \par

## 2021-05-13 NOTE — PHYSICAL EXAM
[No Acute Distress] : no acute distress [No Respiratory Distress] : no respiratory distress  [No Accessory Muscle Use] : no accessory muscle use [No Edema] : there was no peripheral edema [Non-distended] : non-distended [No Focal Deficits] : no focal deficits [Normal Affect] : the affect was normal [Alert and Oriented x3] : oriented to person, place, and time [Normal Insight/Judgement] : insight and judgment were intact

## 2021-05-13 NOTE — HISTORY OF PRESENT ILLNESS
[Home] : at home, [unfilled] , at the time of the visit. [Other Location: e.g. Home (Enter Location, City,State)___] : at [unfilled] [Family Member] : family member [Verbal consent obtained from patient] : the patient, [unfilled] [FreeTextEntry1] : follow up dc for asthma exacerbation [de-identified] : Patient is 81 y/o female enrolled in the TCM HF program with a PMH  of Asthma , DM, and HTN presented to the ED with c/o increased SOB., patient was admitted treated with steroids and nebulizers. she improved clinically ans was dc home with outpatient follow up scheduled for 5/28.  patient observed via tele health alert in NAD , denies c/p, fever, sob, NVD  and cough.

## 2021-05-19 DIAGNOSIS — F32.9 MAJOR DEPRESSIVE DISORDER, SINGLE EPISODE, UNSPECIFIED: ICD-10-CM

## 2021-05-19 DIAGNOSIS — Z91.018 ALLERGY TO OTHER FOODS: ICD-10-CM

## 2021-05-19 DIAGNOSIS — N18.32 CHRONIC KIDNEY DISEASE, STAGE 3B: ICD-10-CM

## 2021-05-19 DIAGNOSIS — R06.02 SHORTNESS OF BREATH: ICD-10-CM

## 2021-05-19 DIAGNOSIS — E11.22 TYPE 2 DIABETES MELLITUS WITH DIABETIC CHRONIC KIDNEY DISEASE: ICD-10-CM

## 2021-05-19 DIAGNOSIS — E78.5 HYPERLIPIDEMIA, UNSPECIFIED: ICD-10-CM

## 2021-05-19 DIAGNOSIS — L40.9 PSORIASIS, UNSPECIFIED: ICD-10-CM

## 2021-05-19 DIAGNOSIS — J45.901 UNSPECIFIED ASTHMA WITH (ACUTE) EXACERBATION: ICD-10-CM

## 2021-05-19 DIAGNOSIS — Z79.84 LONG TERM (CURRENT) USE OF ORAL HYPOGLYCEMIC DRUGS: ICD-10-CM

## 2021-05-19 DIAGNOSIS — I12.9 HYPERTENSIVE CHRONIC KIDNEY DISEASE WITH STAGE 1 THROUGH STAGE 4 CHRONIC KIDNEY DISEASE, OR UNSPECIFIED CHRONIC KIDNEY DISEASE: ICD-10-CM

## 2021-05-19 DIAGNOSIS — Z85.038 PERSONAL HISTORY OF OTHER MALIGNANT NEOPLASM OF LARGE INTESTINE: ICD-10-CM

## 2021-05-27 ENCOUNTER — APPOINTMENT (OUTPATIENT)
Dept: INTERNAL MEDICINE | Facility: CLINIC | Age: 81
End: 2021-05-27
Payer: MEDICARE

## 2021-05-27 ENCOUNTER — OUTPATIENT (OUTPATIENT)
Dept: OUTPATIENT SERVICES | Facility: HOSPITAL | Age: 81
LOS: 1 days | Discharge: HOME | End: 2021-05-27

## 2021-05-27 VITALS
WEIGHT: 158.7 LBS | HEIGHT: 59 IN | BODY MASS INDEX: 32 KG/M2 | SYSTOLIC BLOOD PRESSURE: 132 MMHG | DIASTOLIC BLOOD PRESSURE: 81 MMHG | HEART RATE: 54 BPM | TEMPERATURE: 96.3 F | OXYGEN SATURATION: 98 %

## 2021-05-27 DIAGNOSIS — Z96.651 PRESENCE OF RIGHT ARTIFICIAL KNEE JOINT: Chronic | ICD-10-CM

## 2021-05-27 DIAGNOSIS — Z98.891 HISTORY OF UTERINE SCAR FROM PREVIOUS SURGERY: Chronic | ICD-10-CM

## 2021-05-27 DIAGNOSIS — Z96.0 PRESENCE OF UROGENITAL IMPLANTS: Chronic | ICD-10-CM

## 2021-05-27 DIAGNOSIS — Z90.49 ACQUIRED ABSENCE OF OTHER SPECIFIED PARTS OF DIGESTIVE TRACT: Chronic | ICD-10-CM

## 2021-05-27 PROCEDURE — 99213 OFFICE O/P EST LOW 20 MIN: CPT | Mod: GC

## 2021-05-27 NOTE — ASSESSMENT
[FreeTextEntry1] : 79 year old female with a PMHx of DLD, T2DM, HTN, and asthma presenting to clinic for fu after being dischatrged for asthma exacerbation\par \par # Asthma\par - was recently discharged for asthma exacerbation\par - finished po steriod dose\par - on Albuterol PRN\par - advair 100/50 1 puff two times a day\par - start spiriva daily\par - pulmonary fu\par \par # Dizziness\par - r/o BPPV vs orthostatic vs bradycardia induced\par - decrease toprol dose to 50 qd\par - cardio fu\par - vestibular rehab\par - hydration as needed\par \par # DM2 - Imprving \par - A1c 7.1% (10/2020);\par - Continue with jardiance and januvia and poiglitazone\par - fu with dr. Modi\par - repeat HBA1c\par - Follow up with Podiatry and Opthalmology (referral given)\par - Advised low carbohydrate and simple sugar diet\par \par # HTN - Controlled \par - Continue Toprol, HCTZ, and Nifedipine and losartan\par - bradycardic --> decrease toprol to 50\par - dizziness could be due to bradycardia\par - vestibular rehab\par \par # DLD - Stable \par - Continue Simvastatin \par - Can repeat lipid profile\par \par # Depression\par - Continue Sertraline\par \par # Thrombocytosis\par - repeat CBC\par \par # Colon cancer s/p resection \par - No active problems\par \par HEALTH CARE MAINTENANCE\par - PNA uptodate\par - Colonoscopy performed in 10/2017; \par - Can follow up in 6 months and prn.

## 2021-05-27 NOTE — HISTORY OF PRESENT ILLNESS
[FreeTextEntry1] : fu [de-identified] : 79 year old female with a PMHx of DLD, T2DM, HTN, and asthma presenting to the clinic for follow up. \par Patient was recently discharged from the hospital 2 weeks ago for asthma exacerbation. she was treated with inhalors and steriods. Today she feels fine with no complaints. no SOB or chest pain. no wheezing. she is only requiring the albuterol as needed once a day. Patient also reports dizziness which started last month. she is attributing it to the decrease in Nifedipine dose from 60 to 30 mg but this was done in Dec 2020. patient does not check he bp at home. no other symptoms. she still has knee pains. she also has some dysuria which is improving

## 2021-05-28 ENCOUNTER — APPOINTMENT (OUTPATIENT)
Dept: INTERNAL MEDICINE | Facility: CLINIC | Age: 81
End: 2021-05-28

## 2021-05-28 DIAGNOSIS — E11.65 TYPE 2 DIABETES MELLITUS WITH HYPERGLYCEMIA: ICD-10-CM

## 2021-05-28 DIAGNOSIS — J45.909 UNSPECIFIED ASTHMA, UNCOMPLICATED: ICD-10-CM

## 2021-05-28 DIAGNOSIS — E66.9 OBESITY, UNSPECIFIED: ICD-10-CM

## 2021-05-28 DIAGNOSIS — I10 ESSENTIAL (PRIMARY) HYPERTENSION: ICD-10-CM

## 2021-05-28 DIAGNOSIS — E78.5 HYPERLIPIDEMIA, UNSPECIFIED: ICD-10-CM

## 2021-06-03 ENCOUNTER — RX CHANGE (OUTPATIENT)
Age: 81
End: 2021-06-03

## 2021-06-21 RX ORDER — TIOTROPIUM BROMIDE 18 UG/1
18 CAPSULE ORAL; RESPIRATORY (INHALATION) DAILY
Qty: 30 | Refills: 3 | Status: DISCONTINUED | COMMUNITY
Start: 2021-05-27 | End: 2021-06-21

## 2021-06-25 ENCOUNTER — NON-APPOINTMENT (OUTPATIENT)
Age: 81
End: 2021-06-25

## 2021-06-25 ENCOUNTER — OUTPATIENT (OUTPATIENT)
Dept: OUTPATIENT SERVICES | Facility: HOSPITAL | Age: 81
LOS: 1 days | Discharge: HOME | End: 2021-06-25

## 2021-06-25 ENCOUNTER — APPOINTMENT (OUTPATIENT)
Dept: PULMONOLOGY | Facility: CLINIC | Age: 81
End: 2021-06-25
Payer: MEDICARE

## 2021-06-25 VITALS
SYSTOLIC BLOOD PRESSURE: 129 MMHG | HEIGHT: 59 IN | BODY MASS INDEX: 31.85 KG/M2 | DIASTOLIC BLOOD PRESSURE: 69 MMHG | TEMPERATURE: 97.6 F | WEIGHT: 158 LBS | HEART RATE: 98 BPM

## 2021-06-25 DIAGNOSIS — Z98.891 HISTORY OF UTERINE SCAR FROM PREVIOUS SURGERY: Chronic | ICD-10-CM

## 2021-06-25 DIAGNOSIS — Z96.651 PRESENCE OF RIGHT ARTIFICIAL KNEE JOINT: Chronic | ICD-10-CM

## 2021-06-25 DIAGNOSIS — J45.909 UNSPECIFIED ASTHMA, UNCOMPLICATED: ICD-10-CM

## 2021-06-25 DIAGNOSIS — Z96.0 PRESENCE OF UROGENITAL IMPLANTS: Chronic | ICD-10-CM

## 2021-06-25 DIAGNOSIS — Z90.49 ACQUIRED ABSENCE OF OTHER SPECIFIED PARTS OF DIGESTIVE TRACT: Chronic | ICD-10-CM

## 2021-06-25 PROCEDURE — 99213 OFFICE O/P EST LOW 20 MIN: CPT | Mod: GC

## 2021-06-25 RX ORDER — TIOTROPIUM BROMIDE 18 UG/1
18 CAPSULE ORAL; RESPIRATORY (INHALATION) DAILY
Qty: 1 | Refills: 1 | Status: DISCONTINUED | COMMUNITY
End: 2021-06-25

## 2021-06-25 NOTE — ASSESSMENT
[FreeTextEntry1] : # Persistent asthma now controlled: Diagnosis based on history and response to prednisone. Has mice in home which could be trigger. Continue with ICS/LABA, add Singulair. No need for LAMA at this time. If no improvement can increase dose of advair. Need IgE and allergy profile to identify triggers. CBC did not reveal eosinophilia. CXR is normal. PFTs were done poorly cant interpret. Denies GERD or UACS. RTC after tests. 2-3 months

## 2021-06-25 NOTE — HISTORY OF PRESENT ILLNESS
[TextBox_4] : 80 yo F with asthma since childhood, never smoker comes for fup post hospitalization for asthma exacerbation. She does have hx of intubation from asthma exacerbation. She had an uncomplicated stay this time around and discharged on prednisone taper. She feels well, no complaints. No known allergies. She has dog but never known to be allergic. Her asthma has been worse the last 3 years. She lives in apt with mice. no cockroaches. No nocturnal symptoms. Uses PRISCILLA infrequently.

## 2021-07-06 ENCOUNTER — RX RENEWAL (OUTPATIENT)
Age: 81
End: 2021-07-06

## 2021-07-08 RX ORDER — SITAGLIPTIN AND METFORMIN HYDROCHLORIDE 50; 1000 MG/1; MG/1
50-1000 TABLET, FILM COATED ORAL
Qty: 180 | Refills: 2 | Status: DISCONTINUED | COMMUNITY
Start: 2020-02-24 | End: 2021-07-08

## 2021-07-08 RX ORDER — INSULIN DEGLUDEC INJECTION 100 U/ML
100 INJECTION, SOLUTION SUBCUTANEOUS DAILY
Qty: 1 | Refills: 2 | Status: DISCONTINUED | COMMUNITY
Start: 2020-02-24 | End: 2021-07-08

## 2021-07-08 RX ORDER — SITAGLIPTIN AND METFORMIN HYDROCHLORIDE 50; 1000 MG/1; MG/1
50-1000 TABLET, FILM COATED, EXTENDED RELEASE ORAL
Qty: 180 | Refills: 2 | Status: DISCONTINUED | COMMUNITY
Start: 2020-08-12 | End: 2021-07-08

## 2021-07-08 RX ORDER — EMPAGLIFLOZIN 25 MG/1
25 TABLET, FILM COATED ORAL
Qty: 90 | Refills: 3 | Status: DISCONTINUED | COMMUNITY
Start: 2020-08-12 | End: 2021-07-08

## 2021-09-13 NOTE — HISTORY OF PRESENT ILLNESS
Ct was paged- Ct Abdomen pelvis with iv contrast   [FreeTextEntry1] : The patient was admitted tp [facility] on February secondary to respiratory failure secondary to viral infection. She has been treated for Asthma. Her SOB has improved but she has been c/o chest pain described as sharp pain which lasts about 2-3 minutes before resoving . . The patient has had a nuclear stress test 13 years ago which was negative for ischemia . She is diabetic now on Insulin .

## 2021-09-14 ENCOUNTER — OUTPATIENT (OUTPATIENT)
Dept: OUTPATIENT SERVICES | Facility: HOSPITAL | Age: 81
LOS: 1 days | Discharge: HOME | End: 2021-09-14
Payer: MEDICARE

## 2021-09-14 ENCOUNTER — APPOINTMENT (OUTPATIENT)
Dept: OPHTHALMOLOGY | Facility: CLINIC | Age: 81
End: 2021-09-14

## 2021-09-14 DIAGNOSIS — Z90.49 ACQUIRED ABSENCE OF OTHER SPECIFIED PARTS OF DIGESTIVE TRACT: Chronic | ICD-10-CM

## 2021-09-14 DIAGNOSIS — Z96.651 PRESENCE OF RIGHT ARTIFICIAL KNEE JOINT: Chronic | ICD-10-CM

## 2021-09-14 DIAGNOSIS — Z98.891 HISTORY OF UTERINE SCAR FROM PREVIOUS SURGERY: Chronic | ICD-10-CM

## 2021-09-14 DIAGNOSIS — Z96.0 PRESENCE OF UROGENITAL IMPLANTS: Chronic | ICD-10-CM

## 2021-09-14 PROCEDURE — 92004 COMPRE OPH EXAM NEW PT 1/>: CPT

## 2021-09-14 PROCEDURE — 92133 CPTRZD OPH DX IMG PST SGM ON: CPT | Mod: 26

## 2021-09-14 PROCEDURE — 92202 OPSCPY EXTND ON/MAC DRAW: CPT

## 2021-09-16 DIAGNOSIS — H40.003 PREGLAUCOMA, UNSPECIFIED, BILATERAL: ICD-10-CM

## 2021-09-16 DIAGNOSIS — I10 ESSENTIAL (PRIMARY) HYPERTENSION: ICD-10-CM

## 2021-09-16 DIAGNOSIS — H52.4 PRESBYOPIA: ICD-10-CM

## 2021-09-16 DIAGNOSIS — E11.9 TYPE 2 DIABETES MELLITUS WITHOUT COMPLICATIONS: ICD-10-CM

## 2021-09-23 ENCOUNTER — APPOINTMENT (OUTPATIENT)
Dept: INTERNAL MEDICINE | Facility: CLINIC | Age: 81
End: 2021-09-23

## 2021-09-24 ENCOUNTER — APPOINTMENT (OUTPATIENT)
Dept: PULMONOLOGY | Facility: CLINIC | Age: 81
End: 2021-09-24
Payer: MEDICARE

## 2021-09-24 ENCOUNTER — LABORATORY RESULT (OUTPATIENT)
Age: 81
End: 2021-09-24

## 2021-09-24 ENCOUNTER — OUTPATIENT (OUTPATIENT)
Dept: OUTPATIENT SERVICES | Facility: HOSPITAL | Age: 81
LOS: 1 days | Discharge: HOME | End: 2021-09-24

## 2021-09-24 ENCOUNTER — NON-APPOINTMENT (OUTPATIENT)
Age: 81
End: 2021-09-24

## 2021-09-24 VITALS
WEIGHT: 158 LBS | HEART RATE: 77 BPM | BODY MASS INDEX: 31.85 KG/M2 | HEIGHT: 59 IN | TEMPERATURE: 96.3 F | OXYGEN SATURATION: 97 % | SYSTOLIC BLOOD PRESSURE: 137 MMHG | DIASTOLIC BLOOD PRESSURE: 83 MMHG

## 2021-09-24 DIAGNOSIS — Z96.651 PRESENCE OF RIGHT ARTIFICIAL KNEE JOINT: Chronic | ICD-10-CM

## 2021-09-24 DIAGNOSIS — Z98.891 HISTORY OF UTERINE SCAR FROM PREVIOUS SURGERY: Chronic | ICD-10-CM

## 2021-09-24 DIAGNOSIS — Z90.49 ACQUIRED ABSENCE OF OTHER SPECIFIED PARTS OF DIGESTIVE TRACT: Chronic | ICD-10-CM

## 2021-09-24 DIAGNOSIS — Z96.0 PRESENCE OF UROGENITAL IMPLANTS: Chronic | ICD-10-CM

## 2021-09-24 PROCEDURE — 99213 OFFICE O/P EST LOW 20 MIN: CPT | Mod: GC

## 2021-09-24 NOTE — PHYSICAL EXAM
[No Acute Distress] : no acute distress [Normal Oropharynx] : normal oropharynx [Normal Rate/Rhythm] : normal rate/rhythm [Normal S1, S2] : normal s1, s2 [No Murmurs] : no murmurs [Wheeze] : wheeze [No Resp Distress] : no resp distress [No Abnormalities] : no abnormalities [Benign] : benign

## 2021-09-24 NOTE — ASSESSMENT
[FreeTextEntry1] : 80 yo F with asthma since childhood, never smoker comes for fu. patient was never intubated. No known allergies. She has dog but never known to be allergic. Her asthma has been worse the last 3 years. She lives in apt with mice. no cockroaches. No nocturnal symptoms. Patient is compliant with medications. today patient c/o cough x 4 days, productive with clear phlems, no fever or chills. denies any night awakenings. \par \par # Persistent asthma: \par -PFTs were done poorly cant interpret. , Diagnosis based on history and response to prednisone. \par -Has mice in home which could be trigger. \par -c/w albuterol rescue inhaler\par -Continue with advair ICS/LABA, add Singulair. No need for LAMA at this time. If no improvement can increase dose of advair. \par -c/w montelukast\par -patient is wheezing on exam: Prednisone 20mg x 5 days\par -flu vaccine today. covid vaccine up to date\par -Need IgE and allergy profile to identify triggers. \par -Denies GERD or UACS. RTC after tests. 2-3 months.

## 2021-09-24 NOTE — HISTORY OF PRESENT ILLNESS
[TextBox_4] : 80 yo F with asthma since childhood, never smoker comes for fu. patient was never intubated. No known allergies. She has dog but never known to be allergic. Her asthma has been worse the last 3 years. She lives in apt with mice. no cockroaches. No nocturnal symptoms. Patient is compliant with medications. today patient c/o cough x 4 days, productive with clear phlems, no fever or chills. denies any night awakenings.

## 2021-09-28 LAB
BARLEY IGE QN: 0.11 KUA/L
CHERRY IGE QN: <0.1 KUA/L
COW MILK IGE QN: 0.2 KUA/L
CRAB IGE QN: <0.1 KUA/L
DEPRECATED BARLEY IGE RAST QL: NORMAL
DEPRECATED CHERRY IGE RAST QL: 0
DEPRECATED COW MILK IGE RAST QL: NORMAL
DEPRECATED CRAB IGE RAST QL: 0
DEPRECATED EGG WHITE IGE RAST QL: 0
DEPRECATED OAT IGE RAST QL: NORMAL
DEPRECATED PEANUT IGE RAST QL: NORMAL
DEPRECATED RYE IGE RAST QL: 0
DEPRECATED SOYBEAN IGE RAST QL: 0
DEPRECATED WHEAT IGE RAST QL: 0
EGG WHITE IGE QN: <0.1 KUA/L
OAT IGE QN: 0.11 KUA/L
PEANUT IGE QN: 0.12 KUA/L
RYE IGE QN: <0.1 KUA/L
SOYBEAN IGE QN: <0.1 KUA/L
TOTAL IGE SMQN RAST: 3801 KU/L
WHEAT IGE QN: <0.1 KUA/L

## 2021-10-01 LAB
ALBUMIN SERPL ELPH-MCNC: 4.8 G/DL
ALP BLD-CCNC: 73 U/L
ALT SERPL-CCNC: 14 U/L
ANION GAP SERPL CALC-SCNC: 14 MMOL/L
AST SERPL-CCNC: 16 U/L
BASOPHILS # BLD AUTO: 0.1 K/UL
BASOPHILS NFR BLD AUTO: 0.7 %
BILIRUB SERPL-MCNC: 0.2 MG/DL
BUN SERPL-MCNC: 25 MG/DL
CALCIUM SERPL-MCNC: 9.9 MG/DL
CHLORIDE SERPL-SCNC: 100 MMOL/L
CHOLEST SERPL-MCNC: 183 MG/DL
CO2 SERPL-SCNC: 23 MMOL/L
CREAT SERPL-MCNC: 1.1 MG/DL
EOSINOPHIL # BLD AUTO: 0.46 K/UL
EOSINOPHIL NFR BLD AUTO: 3.3 %
ESTIMATED AVERAGE GLUCOSE: 194 MG/DL
GLUCOSE SERPL-MCNC: 179 MG/DL
HBA1C MFR BLD HPLC: 8.4 %
HCT VFR BLD CALC: 42.3 %
HDLC SERPL-MCNC: 95 MG/DL
HGB BLD-MCNC: 13.1 G/DL
IMM GRANULOCYTES NFR BLD AUTO: 0.4 %
LDLC SERPL CALC-MCNC: 65 MG/DL
LYMPHOCYTES # BLD AUTO: 4.64 K/UL
LYMPHOCYTES NFR BLD AUTO: 33.6 %
MAN DIFF?: NORMAL
MCHC RBC-ENTMCNC: 27.3 PG
MCHC RBC-ENTMCNC: 31 G/DL
MCV RBC AUTO: 88.3 FL
MONOCYTES # BLD AUTO: 1.03 K/UL
MONOCYTES NFR BLD AUTO: 7.5 %
NEUTROPHILS # BLD AUTO: 7.52 K/UL
NEUTROPHILS NFR BLD AUTO: 54.5 %
NONHDLC SERPL-MCNC: 88 MG/DL
PLATELET # BLD AUTO: 455 K/UL
POTASSIUM SERPL-SCNC: 4.3 MMOL/L
PROT SERPL-MCNC: 8.5 G/DL
RBC # BLD: 4.79 M/UL
RBC # FLD: 14.9 %
SODIUM SERPL-SCNC: 137 MMOL/L
TRIGL SERPL-MCNC: 138 MG/DL
WBC # FLD AUTO: 13.81 K/UL

## 2021-10-02 LAB — 25(OH)D3 SERPL-MCNC: 23 NG/ML

## 2021-10-04 ENCOUNTER — APPOINTMENT (OUTPATIENT)
Dept: INTERNAL MEDICINE | Facility: CLINIC | Age: 81
End: 2021-10-04
Payer: MEDICARE

## 2021-10-04 ENCOUNTER — OUTPATIENT (OUTPATIENT)
Dept: OUTPATIENT SERVICES | Facility: HOSPITAL | Age: 81
LOS: 1 days | Discharge: HOME | End: 2021-10-04

## 2021-10-04 VITALS
OXYGEN SATURATION: 96 % | SYSTOLIC BLOOD PRESSURE: 150 MMHG | HEIGHT: 59 IN | DIASTOLIC BLOOD PRESSURE: 85 MMHG | WEIGHT: 154 LBS | BODY MASS INDEX: 31.04 KG/M2 | HEART RATE: 76 BPM | TEMPERATURE: 97 F

## 2021-10-04 DIAGNOSIS — Z00.00 ENCOUNTER FOR GENERAL ADULT MEDICAL EXAMINATION WITHOUT ABNORMAL FINDINGS: ICD-10-CM

## 2021-10-04 DIAGNOSIS — Z96.651 PRESENCE OF RIGHT ARTIFICIAL KNEE JOINT: Chronic | ICD-10-CM

## 2021-10-04 DIAGNOSIS — Z90.49 ACQUIRED ABSENCE OF OTHER SPECIFIED PARTS OF DIGESTIVE TRACT: Chronic | ICD-10-CM

## 2021-10-04 DIAGNOSIS — Z23 ENCOUNTER FOR IMMUNIZATION: ICD-10-CM

## 2021-10-04 DIAGNOSIS — J45.909 UNSPECIFIED ASTHMA, UNCOMPLICATED: ICD-10-CM

## 2021-10-04 DIAGNOSIS — Z96.0 PRESENCE OF UROGENITAL IMPLANTS: Chronic | ICD-10-CM

## 2021-10-04 DIAGNOSIS — F32.9 MAJOR DEPRESSIVE DISORDER, SINGLE EPISODE, UNSPECIFIED: ICD-10-CM

## 2021-10-04 DIAGNOSIS — Z98.891 HISTORY OF UTERINE SCAR FROM PREVIOUS SURGERY: Chronic | ICD-10-CM

## 2021-10-04 PROCEDURE — 99213 OFFICE O/P EST LOW 20 MIN: CPT | Mod: GC

## 2021-10-04 NOTE — HISTORY OF PRESENT ILLNESS
[de-identified] : 79 year old female with a PMHx of DLD, T2DM, HTN, and asthma presenting to the clinic for follow up after having a asthma exacrebation and completing PO steroid course on 10/3.\par

## 2021-10-04 NOTE — ASSESSMENT
[FreeTextEntry1] : 79 year old female with a PMHx of DLD, T2DM, HTN, and asthma presenting to clinic for fu after being discharged for asthma exacerbation\par \par # Asthma\par - finished po steriod dose\par - on Albuterol PRN\par - advair 100/50 1 puff two times a day\par - pulmonary fu\par \par # Dizziness- resolved\par - toprol dose 50 qd\par \par # DM2 - Imprving \par - A1c 8.4% (10/1);\par - Continue with trijardy and poiglitazone\par - fu with dr. Modi\par - Follow up with Podiatry and Opthalmology \par - Advised low carbohydrate and simple sugar diet\par \par # HTN - Controlled \par Elevated on this visit, likely due to steroids\par - Continue Toprol, HCTZ, and Nifedipine and losartan\par \par # DLD - Stable \par - Continue Simvastatin \par \par # Depression\par - Continue Sertraline\par \par # Thrombocytosis- mild stable\par \par # Colon cancer s/p resection \par - No active problems\par \par HEALTH CARE MAINTENANCE\par - Colonoscopy performed in 10/2017; \par -Mammography- had this year\par -Pap smear- pt. will schedule [facility]\par -Influenza- shot last week\par - Can follow up in 6 months and prn.

## 2021-10-04 NOTE — PHYSICAL EXAM
[No Acute Distress] : no acute distress [Well Nourished] : well nourished [Well Developed] : well developed [Normal Sclera/Conjunctiva] : normal sclera/conjunctiva [Normal Outer Ear/Nose] : the outer ears and nose were normal in appearance [Normal Oropharynx] : the oropharynx was normal [No JVD] : no jugular venous distention [No Respiratory Distress] : no respiratory distress  [No Accessory Muscle Use] : no accessory muscle use [Normal Rate] : normal rate  [Normal S1, S2] : normal S1 and S2 [Soft] : abdomen soft [Non Tender] : non-tender [No HSM] : no HSM

## 2021-10-06 DIAGNOSIS — B37.3 CANDIDIASIS OF VULVA AND VAGINA: ICD-10-CM

## 2021-10-08 LAB — IGE AB SERPL QL: 43 NG/ML

## 2021-10-12 ENCOUNTER — NON-APPOINTMENT (OUTPATIENT)
Age: 81
End: 2021-10-12

## 2021-10-13 RX ORDER — EMPAGLIFLOZIN, LINAGLIPTIN, METFORMIN HYDROCHLORIDE 12.5; 2.5; 1 MG/1; MG/1; MG/1
12.5-2.5-1 TABLET, EXTENDED RELEASE ORAL DAILY
Qty: 180 | Refills: 0 | Status: DISCONTINUED | COMMUNITY
Start: 2021-07-06 | End: 2021-10-13

## 2021-10-14 ENCOUNTER — NON-APPOINTMENT (OUTPATIENT)
Age: 81
End: 2021-10-14

## 2021-10-15 ENCOUNTER — APPOINTMENT (OUTPATIENT)
Dept: OPHTHALMOLOGY | Facility: CLINIC | Age: 81
End: 2021-10-15

## 2021-10-29 ENCOUNTER — APPOINTMENT (OUTPATIENT)
Dept: PULMONOLOGY | Facility: CLINIC | Age: 81
End: 2021-10-29

## 2021-12-22 ENCOUNTER — APPOINTMENT (OUTPATIENT)
Dept: OPHTHALMOLOGY | Facility: CLINIC | Age: 81
End: 2021-12-22

## 2021-12-23 ENCOUNTER — APPOINTMENT (OUTPATIENT)
Dept: ENDOCRINOLOGY | Facility: CLINIC | Age: 81
End: 2021-12-23

## 2022-01-07 ENCOUNTER — APPOINTMENT (OUTPATIENT)
Dept: RHEUMATOLOGY | Facility: CLINIC | Age: 82
End: 2022-01-07

## 2022-03-30 ENCOUNTER — APPOINTMENT (OUTPATIENT)
Dept: INTERNAL MEDICINE | Facility: CLINIC | Age: 82
End: 2022-03-30

## 2022-05-18 ENCOUNTER — APPOINTMENT (OUTPATIENT)
Dept: CARDIOLOGY | Facility: CLINIC | Age: 82
End: 2022-05-18
Payer: MEDICARE

## 2022-05-18 VITALS
HEIGHT: 59 IN | WEIGHT: 159 LBS | HEART RATE: 63 BPM | TEMPERATURE: 97.1 F | BODY MASS INDEX: 32.05 KG/M2 | DIASTOLIC BLOOD PRESSURE: 82 MMHG | SYSTOLIC BLOOD PRESSURE: 144 MMHG

## 2022-05-18 PROCEDURE — 99214 OFFICE O/P EST MOD 30 MIN: CPT | Mod: 25

## 2022-05-18 PROCEDURE — 93000 ELECTROCARDIOGRAM COMPLETE: CPT

## 2022-05-18 NOTE — PHYSICAL EXAM
[General Appearance - Well Developed] : well developed [Normal Appearance] : normal appearance [Well Groomed] : well groomed [General Appearance - Well Nourished] : well nourished [No Deformities] : no deformities [General Appearance - In No Acute Distress] : no acute distress [Normal Conjunctiva] : the conjunctiva exhibited no abnormalities [Eyelids - No Xanthelasma] : the eyelids demonstrated no xanthelasmas [Normal Oral Mucosa] : normal oral mucosa [No Oral Pallor] : no oral pallor [No Oral Cyanosis] : no oral cyanosis [Respiration, Rhythm And Depth] : normal respiratory rhythm and effort [Exaggerated Use Of Accessory Muscles For Inspiration] : no accessory muscle use [Auscultation Breath Sounds / Voice Sounds] : lungs were clear to auscultation bilaterally [Abdomen Soft] : soft [Abdomen Tenderness] : non-tender [Abdomen Mass (___ Cm)] : no abdominal mass palpated [Skin Color & Pigmentation] : normal skin color and pigmentation [] : no rash [No Venous Stasis] : no venous stasis [No Skin Ulcers] : no skin ulcer [Skin Lesions] : no skin lesions [No Xanthoma] : no  xanthoma was observed [Oriented To Time, Place, And Person] : oriented to person, place, and time [Affect] : the affect was normal [Mood] : the mood was normal [No Anxiety] : not feeling anxious [Normal Rate] : normal [Rhythm Regular] : regular [No Murmur] : no murmurs heard [1+] : left 1+ [No Pitting Edema] : no pitting edema present [FreeTextEntry1] : Walks with walker

## 2022-05-18 NOTE — HISTORY OF PRESENT ILLNESS
[FreeTextEntry1] : The patient had had dizziness usually when getting u from a seated position . no chest pain . Some ROWE which she attributes to her her asthma . .

## 2022-05-18 NOTE — ASSESSMENT
[FreeTextEntry1] : The patient has continued ROWE . She has some dizziness which  occurs when she stands from a seated position. She is not orthostatic on exam . she talkes all of her BP medications at the same time . The patient has a mumur at the base but does not sound like she has AS .

## 2022-07-01 ENCOUNTER — APPOINTMENT (OUTPATIENT)
Dept: CARDIOLOGY | Facility: CLINIC | Age: 82
End: 2022-07-01

## 2022-07-01 DIAGNOSIS — R01.1 CARDIAC MURMUR, UNSPECIFIED: ICD-10-CM

## 2022-07-01 PROCEDURE — 93306 TTE W/DOPPLER COMPLETE: CPT

## 2022-07-04 LAB
ALBUMIN SERPL ELPH-MCNC: 4.2 G/DL
ALP BLD-CCNC: 55 U/L
ALT SERPL-CCNC: 8 U/L
ANION GAP SERPL CALC-SCNC: 14 MMOL/L
AST SERPL-CCNC: 13 U/L
BASOPHILS # BLD AUTO: 0.09 K/UL
BASOPHILS NFR BLD AUTO: 0.8 %
BILIRUB SERPL-MCNC: <0.2 MG/DL
BUN SERPL-MCNC: 31 MG/DL
CALCIUM SERPL-MCNC: 9.3 MG/DL
CHLORIDE SERPL-SCNC: 104 MMOL/L
CHOLEST SERPL-MCNC: 153 MG/DL
CO2 SERPL-SCNC: 21 MMOL/L
CREAT SERPL-MCNC: 1.1 MG/DL
EGFR: 51 ML/MIN/1.73M2
EOSINOPHIL # BLD AUTO: 0.57 K/UL
EOSINOPHIL NFR BLD AUTO: 5.2 %
ESTIMATED AVERAGE GLUCOSE: 157 MG/DL
GLUCOSE SERPL-MCNC: 141 MG/DL
HBA1C MFR BLD HPLC: 7.1 %
HCT VFR BLD CALC: 36.6 %
HDLC SERPL-MCNC: 71 MG/DL
HGB BLD-MCNC: 11.5 G/DL
IMM GRANULOCYTES NFR BLD AUTO: 0.4 %
LDLC SERPL CALC-MCNC: 39 MG/DL
LYMPHOCYTES # BLD AUTO: 2.57 K/UL
LYMPHOCYTES NFR BLD AUTO: 23.5 %
MAN DIFF?: NORMAL
MCHC RBC-ENTMCNC: 28.1 PG
MCHC RBC-ENTMCNC: 31.4 G/DL
MCV RBC AUTO: 89.5 FL
MONOCYTES # BLD AUTO: 0.72 K/UL
MONOCYTES NFR BLD AUTO: 6.6 %
NEUTROPHILS # BLD AUTO: 6.93 K/UL
NEUTROPHILS NFR BLD AUTO: 63.5 %
NONHDLC SERPL-MCNC: 82 MG/DL
PLATELET # BLD AUTO: 402 K/UL
POTASSIUM SERPL-SCNC: 5.1 MMOL/L
PROT SERPL-MCNC: 7.4 G/DL
RBC # BLD: 4.09 M/UL
RBC # FLD: 14.3 %
SODIUM SERPL-SCNC: 139 MMOL/L
TRIGL SERPL-MCNC: 215 MG/DL
WBC # FLD AUTO: 10.92 K/UL

## 2022-07-05 ENCOUNTER — OUTPATIENT (OUTPATIENT)
Dept: OUTPATIENT SERVICES | Facility: HOSPITAL | Age: 82
LOS: 1 days | Discharge: HOME | End: 2022-07-05

## 2022-07-05 ENCOUNTER — APPOINTMENT (OUTPATIENT)
Dept: INTERNAL MEDICINE | Facility: CLINIC | Age: 82
End: 2022-07-05

## 2022-07-05 VITALS
HEART RATE: 69 BPM | HEIGHT: 59 IN | DIASTOLIC BLOOD PRESSURE: 76 MMHG | TEMPERATURE: 96.8 F | WEIGHT: 159 LBS | OXYGEN SATURATION: 99 % | BODY MASS INDEX: 32.05 KG/M2 | SYSTOLIC BLOOD PRESSURE: 117 MMHG

## 2022-07-05 DIAGNOSIS — Z90.49 ACQUIRED ABSENCE OF OTHER SPECIFIED PARTS OF DIGESTIVE TRACT: Chronic | ICD-10-CM

## 2022-07-05 DIAGNOSIS — Z96.0 PRESENCE OF UROGENITAL IMPLANTS: Chronic | ICD-10-CM

## 2022-07-05 DIAGNOSIS — Z87.09 PERSONAL HISTORY OF OTHER DISEASES OF THE RESPIRATORY SYSTEM: ICD-10-CM

## 2022-07-05 DIAGNOSIS — Z98.891 HISTORY OF UTERINE SCAR FROM PREVIOUS SURGERY: Chronic | ICD-10-CM

## 2022-07-05 DIAGNOSIS — Z96.651 PRESENCE OF RIGHT ARTIFICIAL KNEE JOINT: Chronic | ICD-10-CM

## 2022-07-05 DIAGNOSIS — E11.65 TYPE 2 DIABETES MELLITUS WITH HYPERGLYCEMIA: ICD-10-CM

## 2022-07-05 DIAGNOSIS — M53.3 SACROCOCCYGEAL DISORDERS, NOT ELSEWHERE CLASSIFIED: ICD-10-CM

## 2022-07-05 PROCEDURE — 99214 OFFICE O/P EST MOD 30 MIN: CPT | Mod: GC

## 2022-07-05 NOTE — REVIEW OF SYSTEMS
[Postnasal Drip] : postnasal drip [Shortness Of Breath] : shortness of breath [Back Pain] : back pain [Dizziness] : dizziness [Negative] : Integumentary

## 2022-07-06 DIAGNOSIS — I10 ESSENTIAL (PRIMARY) HYPERTENSION: ICD-10-CM

## 2022-07-06 DIAGNOSIS — Z00.00 ENCOUNTER FOR GENERAL ADULT MEDICAL EXAMINATION WITHOUT ABNORMAL FINDINGS: ICD-10-CM

## 2022-07-06 DIAGNOSIS — M53.3 SACROCOCCYGEAL DISORDERS, NOT ELSEWHERE CLASSIFIED: ICD-10-CM

## 2022-07-06 DIAGNOSIS — E11.65 TYPE 2 DIABETES MELLITUS WITH HYPERGLYCEMIA: ICD-10-CM

## 2022-07-06 PROBLEM — R01.1 HEART MURMUR: Status: ACTIVE | Noted: 2020-02-05

## 2022-07-06 NOTE — PLAN
[FreeTextEntry1] : Back Pain:\par X-ray Lumbosacral\par Serum Vitamin D level (patient mentioned her vitamin D was high and her vitamin d supplement was stopped)\par Tylenol PRN\par \par

## 2022-07-06 NOTE — ASSESSMENT
[FreeTextEntry1] : 82 year old female patient known to have structural heart disease, Aortic stenosis, Asthma, HDL, HTN, DM, depression, and colon cancer s/p resection. Presented with right flank pain of unknown duration that is exacerbated by bending now, non radiating. She also mentioned having dizziness when standing up, she is following with her cardiologist.

## 2022-07-06 NOTE — HISTORY OF PRESENT ILLNESS
[FreeTextEntry1] : Right Flank Pain [de-identified] : 82 year old female patient known to have structural heart disease, Aortic stenosis, Asthma, HDL, HTN, DM, depression, and colon cancer s/p resection. Presented with right flank pain of unknown duration that is exacerbated by bending now, non radiating. She also mentioned having dizziness when standing up, she is following with her cardiologist.

## 2022-07-13 NOTE — PROGRESS NOTE ADULT - NSHPATTENDINGPLANDISCUSS_GEN_ALL_CORE
Housestaff
Helical Rim Advancement Flap Text: The defect edges were debeveled with a #15 blade scalpel.  Given the location of the defect and the proximity to free margins (helical rim) a double helical rim advancement flap was deemed most appropriate.  Using a sterile surgical marker, the appropriate advancement flaps were drawn incorporating the defect and placing the expected incisions between the helical rim and antihelix where possible.  The area thus outlined was incised through and through with a #15 scalpel blade.  With a skin hook and iris scissors, the flaps were gently and sharply undermined and freed up.

## 2022-07-24 NOTE — ED PROVIDER NOTE - PSH
H/O colectomy    S/P  section    S/P revision of total knee, right    S/P total knee replacement, right    S/P ureteral stent placement    
DISCHARGE

## 2022-08-17 ENCOUNTER — APPOINTMENT (OUTPATIENT)
Dept: ENDOCRINOLOGY | Facility: CLINIC | Age: 82
End: 2022-08-17

## 2022-10-14 ENCOUNTER — APPOINTMENT (OUTPATIENT)
Dept: PULMONOLOGY | Facility: CLINIC | Age: 82
End: 2022-10-14

## 2022-11-06 NOTE — HISTORY OF PRESENT ILLNESS
[de-identified] : 79 year old female with a PMHx of DLD, T2DM, HTN, and suspected asthma presenting to the clinic for follow up. She was  admitted IN May 2019 to the ICU for acute hypoxic respiratory failure 2/2 viral infection. She followed up at the Pulmonology clinic and was discharged saying no further work up after being unable to complete a PFT.  No new complaints today.  Explaining that mainly she just wants to breath better.  \par   normal (ped)...

## 2022-11-15 ENCOUNTER — APPOINTMENT (OUTPATIENT)
Dept: CARDIOLOGY | Facility: CLINIC | Age: 82
End: 2022-11-15

## 2022-12-14 NOTE — ED PROVIDER NOTE - PHYSICAL EXAMINATION
Adult Physical Exam    Vital Signs: I have reviewed the initial vital signs.  Constitutional: well-nourished, appears stated age, no acute distress  Eyes: Conjunctiva pink, Sclera clear, PERRLA, EOMI.  ENT: OP is clear without exudates, normal dentition, normal gingival, tongue without swelling, TMs clear b/l, nasal turbinates without erythema or discharge, no lymphadenopathy.  Cardiovascular: S1 and S2, regular rate, regular rhythm, well-perfused extremities, radial pulses equal and 2+  Respiratory: mild tachypnea   Gastrointestinal: soft, non-tender abdomen, no pulsatile mass, normal bowl sounds  Musculoskeletal: supple neck, no lower extremity edema, no midline tenderness  Integumentary: warm, dry, no rash  Neurologic: awake, alert, cranial nerves II-XII grossly intact, extremities’ motor and sensory functions grossly intact Physical Exam    Vital Signs: I have reviewed the initial vital signs.  Constitutional: well-nourished, appears stated age, no acute distress  Eyes: Conjunctiva pink, Sclera clear, PERRLA, EOMI.  Cardiovascular: S1 and S2, regular rate, regular rhythm, well-perfused extremities, radial pulses equal and 2+  Respiratory: tachypneic, scattered end expiratory wheezing with poor air entry to bases.   Gastrointestinal: soft, non-tender abdomen, no pulsatile mass, normal bowl sounds  Musculoskeletal: supple neck, no lower extremity edema, no midline tenderness  Integumentary: warm, dry, no rash  Neurologic: awake, alert, cranial nerves II-XII grossly intact, extremities’ motor and sensory functions grossly intact

## 2023-02-15 ENCOUNTER — APPOINTMENT (OUTPATIENT)
Dept: ENDOCRINOLOGY | Facility: CLINIC | Age: 83
End: 2023-02-15
Payer: MEDICARE

## 2023-02-15 ENCOUNTER — APPOINTMENT (OUTPATIENT)
Dept: ENDOCRINOLOGY | Facility: CLINIC | Age: 83
End: 2023-02-15

## 2023-02-15 ENCOUNTER — OUTPATIENT (OUTPATIENT)
Dept: OUTPATIENT SERVICES | Facility: HOSPITAL | Age: 83
LOS: 1 days | End: 2023-02-15
Payer: MEDICARE

## 2023-02-15 VITALS
HEIGHT: 59 IN | DIASTOLIC BLOOD PRESSURE: 78 MMHG | WEIGHT: 156 LBS | BODY MASS INDEX: 31.45 KG/M2 | SYSTOLIC BLOOD PRESSURE: 131 MMHG | HEART RATE: 72 BPM

## 2023-02-15 DIAGNOSIS — Z00.00 ENCOUNTER FOR GENERAL ADULT MEDICAL EXAMINATION WITHOUT ABNORMAL FINDINGS: ICD-10-CM

## 2023-02-15 DIAGNOSIS — Z98.891 HISTORY OF UTERINE SCAR FROM PREVIOUS SURGERY: Chronic | ICD-10-CM

## 2023-02-15 DIAGNOSIS — Z90.49 ACQUIRED ABSENCE OF OTHER SPECIFIED PARTS OF DIGESTIVE TRACT: Chronic | ICD-10-CM

## 2023-02-15 DIAGNOSIS — Z96.651 PRESENCE OF RIGHT ARTIFICIAL KNEE JOINT: Chronic | ICD-10-CM

## 2023-02-15 DIAGNOSIS — Z96.0 PRESENCE OF UROGENITAL IMPLANTS: Chronic | ICD-10-CM

## 2023-02-15 PROCEDURE — 99214 OFFICE O/P EST MOD 30 MIN: CPT

## 2023-02-15 NOTE — HISTORY OF PRESENT ILLNESS
[FreeTextEntry1] : Ms. ACOSTA FLOOD  Is  a 82 year  old female  who presented for evaluation of type 2 Diabetes:\par \par \par Diagnosis : at the age of 72 \par Current Regimen:janumet ER 50/1000 BID , pioglitazone 30  mg daily \par Previous regimens:   none \par Compliance: good \par SMBG/CGM :  in 100s range \par Hypoglycemia:no \par Polyuria/polydipsia :   no \par Weight change/BMI: stable \par Diet: try to watch diet \par Exercise:  functional \par HBa1c trend: 7/2022  7.1% \par \par \par .prevention  \par Statin: \par ACE/ARB :\par Eye examination: \par Neuropathy:\par LORA: \par

## 2023-02-15 NOTE — PHYSICAL EXAM
[Alert] : alert [Well Nourished] : well nourished [No Acute Distress] : no acute distress [Healthy Appearance] : healthy appearance [No Proptosis] : no proptosis [No Lid Lag] : no lid lag [Thyroid Not Enlarged] : the thyroid was not enlarged [No Accessory Muscle Use] : no accessory muscle use [No Respiratory Distress] : no respiratory distress [Clear to Auscultation] : lungs were clear to auscultation bilaterally [Normal S1, S2] : normal S1 and S2 [Regular Rhythm] : with a regular rhythm [No Edema] : no peripheral edema [Not Tender] : non-tender [Soft] : abdomen soft [No Stigmata of Cushings Syndrome] : no stigmata of Cushings Syndrome [Abdominal Striae] : no abdominal striae [Acanthosis Nigricans] : acanthosis nigricans present [No Tremors] : no tremors [Oriented x3] : oriented to person, place, and time

## 2023-02-15 NOTE — REVIEW OF SYSTEMS
[Fatigue] : no fatigue [Recent Weight Gain (___ Lbs)] : no recent weight gain [Recent Weight Loss (___ Lbs)] : no recent weight loss [Blurred Vision] : no blurred vision [Dysphagia] : no dysphagia [Neck Pain] : no neck pain [Dysphonia] : no dysphonia [Chest Pain] : no chest pain [Palpitations] : no palpitations [Fast Heart Rate] : heart rate is not fast [Shortness Of Breath] : no shortness of breath [SOB on Exertion] : no shortness of breath on exertion [As Noted in HPI] : as noted in HPI

## 2023-02-15 NOTE — REASON FOR VISIT
[Follow - Up] : a follow-up visit [DM Type 2] : DM Type 2 [Weight Management/Obesity] : weight management/obesity [Other: _____] : [unfilled] [FreeTextEntry2] : last seen by Dr Modi in 2020 , new to me

## 2023-02-15 NOTE — ASSESSMENT
[Carbohydrate Consistent Diet] : carbohydrate consistent diet [Importance of Diet and Exercise] : importance of diet and exercise to improve glycemic control, achieve weight loss and improve cardiovascular health [Retinopathy Screening] : Patient was referred to ophthalmology for retinopathy screening [FreeTextEntry1] : Ms. ACOSTA FLOOD  Is  a 82 year  old female  who presented for evaluation of type 2 Diabetes:\par \par \par # type 2 DM / DL/ obesity \par -7/2022: A1c 7.1% on janumet ER 50/1000 BID , pioglitazone 30  mg daily \par - reports SMBG in good range for her age \par - she will do labs today as her GFR was borderline before refilling medications \par - continue simvastatin and losartan now \par - refer to opthalmology and podiatry \par \par # vit D deficiency : check level

## 2023-02-17 DIAGNOSIS — E11.9 TYPE 2 DIABETES MELLITUS WITHOUT COMPLICATIONS: ICD-10-CM

## 2023-02-17 DIAGNOSIS — E66.9 OBESITY, UNSPECIFIED: ICD-10-CM

## 2023-02-17 DIAGNOSIS — E78.5 HYPERLIPIDEMIA, UNSPECIFIED: ICD-10-CM

## 2023-02-17 DIAGNOSIS — E55.9 VITAMIN D DEFICIENCY, UNSPECIFIED: ICD-10-CM

## 2023-02-24 LAB
25(OH)D3 SERPL-MCNC: 22 NG/ML
ALBUMIN SERPL ELPH-MCNC: 4.3 G/DL
ALP BLD-CCNC: 57 U/L
ALT SERPL-CCNC: 8 U/L
ANION GAP SERPL CALC-SCNC: 11 MMOL/L
AST SERPL-CCNC: 14 U/L
BASOPHILS # BLD AUTO: 0.07 K/UL
BASOPHILS NFR BLD AUTO: 0.7 %
BILIRUB SERPL-MCNC: 0.2 MG/DL
BUN SERPL-MCNC: 30 MG/DL
CALCIUM SERPL-MCNC: 9.4 MG/DL
CHLORIDE SERPL-SCNC: 103 MMOL/L
CHOLEST SERPL-MCNC: 163 MG/DL
CO2 SERPL-SCNC: 24 MMOL/L
CREAT SERPL-MCNC: 1.1 MG/DL
CREAT SPEC-SCNC: 142 MG/DL
EGFR: 51 ML/MIN/1.73M2
EOSINOPHIL # BLD AUTO: 0.61 K/UL
EOSINOPHIL NFR BLD AUTO: 6.3 %
ESTIMATED AVERAGE GLUCOSE: 174 MG/DL
GLUCOSE SERPL-MCNC: 149 MG/DL
HBA1C MFR BLD HPLC: 7.7 %
HCT VFR BLD CALC: 37.7 %
HDLC SERPL-MCNC: 73 MG/DL
HGB BLD-MCNC: 11.9 G/DL
IMM GRANULOCYTES NFR BLD AUTO: 0.4 %
LDLC SERPL CALC-MCNC: 65 MG/DL
LYMPHOCYTES # BLD AUTO: 2.27 K/UL
LYMPHOCYTES NFR BLD AUTO: 23.3 %
MAN DIFF?: NORMAL
MCHC RBC-ENTMCNC: 28.1 PG
MCHC RBC-ENTMCNC: 31.6 G/DL
MCV RBC AUTO: 89.1 FL
MICROALBUMIN 24H UR DL<=1MG/L-MCNC: 4 MG/DL
MICROALBUMIN/CREAT 24H UR-RTO: 28 MG/G
MONOCYTES # BLD AUTO: 0.64 K/UL
MONOCYTES NFR BLD AUTO: 6.6 %
NEUTROPHILS # BLD AUTO: 6.12 K/UL
NEUTROPHILS NFR BLD AUTO: 62.7 %
NONHDLC SERPL-MCNC: 90 MG/DL
PLATELET # BLD AUTO: 362 K/UL
POTASSIUM SERPL-SCNC: 4.7 MMOL/L
PROT SERPL-MCNC: 7.4 G/DL
RBC # BLD: 4.23 M/UL
RBC # FLD: 13.9 %
SODIUM SERPL-SCNC: 138 MMOL/L
TRIGL SERPL-MCNC: 123 MG/DL
TSH SERPL-ACNC: 2.57 UIU/ML
VIT B12 SERPL-MCNC: 254 PG/ML
WBC # FLD AUTO: 9.75 K/UL

## 2023-03-22 ENCOUNTER — NON-APPOINTMENT (OUTPATIENT)
Age: 83
End: 2023-03-22

## 2023-03-22 ENCOUNTER — APPOINTMENT (OUTPATIENT)
Dept: INTERNAL MEDICINE | Facility: CLINIC | Age: 83
End: 2023-03-22

## 2023-04-18 ENCOUNTER — APPOINTMENT (OUTPATIENT)
Dept: CARDIOLOGY | Facility: CLINIC | Age: 83
End: 2023-04-18

## 2023-04-26 ENCOUNTER — APPOINTMENT (OUTPATIENT)
Dept: CARDIOLOGY | Facility: CLINIC | Age: 83
End: 2023-04-26
Payer: MEDICARE

## 2023-04-26 VITALS
DIASTOLIC BLOOD PRESSURE: 82 MMHG | SYSTOLIC BLOOD PRESSURE: 130 MMHG | HEART RATE: 67 BPM | WEIGHT: 161 LBS | HEIGHT: 59 IN | BODY MASS INDEX: 32.46 KG/M2

## 2023-04-26 DIAGNOSIS — E66.9 OBESITY, UNSPECIFIED: ICD-10-CM

## 2023-04-26 DIAGNOSIS — I35.0 NONRHEUMATIC AORTIC (VALVE) STENOSIS: ICD-10-CM

## 2023-04-26 PROCEDURE — 99214 OFFICE O/P EST MOD 30 MIN: CPT | Mod: 25

## 2023-04-26 PROCEDURE — 93000 ELECTROCARDIOGRAM COMPLETE: CPT

## 2023-05-04 ENCOUNTER — NON-APPOINTMENT (OUTPATIENT)
Age: 83
End: 2023-05-04

## 2023-05-04 ENCOUNTER — OUTPATIENT (OUTPATIENT)
Dept: OUTPATIENT SERVICES | Facility: HOSPITAL | Age: 83
LOS: 1 days | End: 2023-05-04
Payer: MEDICARE

## 2023-05-04 ENCOUNTER — APPOINTMENT (OUTPATIENT)
Dept: INTERNAL MEDICINE | Facility: CLINIC | Age: 83
End: 2023-05-04
Payer: MEDICARE

## 2023-05-04 VITALS
SYSTOLIC BLOOD PRESSURE: 144 MMHG | TEMPERATURE: 96.4 F | DIASTOLIC BLOOD PRESSURE: 73 MMHG | WEIGHT: 161 LBS | OXYGEN SATURATION: 99 % | BODY MASS INDEX: 32.46 KG/M2 | HEIGHT: 59 IN | HEART RATE: 70 BPM

## 2023-05-04 DIAGNOSIS — Z00.00 ENCOUNTER FOR GENERAL ADULT MEDICAL EXAMINATION WITHOUT ABNORMAL FINDINGS: ICD-10-CM

## 2023-05-04 DIAGNOSIS — Z96.651 PRESENCE OF RIGHT ARTIFICIAL KNEE JOINT: Chronic | ICD-10-CM

## 2023-05-04 DIAGNOSIS — Z98.891 HISTORY OF UTERINE SCAR FROM PREVIOUS SURGERY: Chronic | ICD-10-CM

## 2023-05-04 DIAGNOSIS — Z96.0 PRESENCE OF UROGENITAL IMPLANTS: Chronic | ICD-10-CM

## 2023-05-04 DIAGNOSIS — Z90.49 ACQUIRED ABSENCE OF OTHER SPECIFIED PARTS OF DIGESTIVE TRACT: Chronic | ICD-10-CM

## 2023-05-04 PROCEDURE — 99214 OFFICE O/P EST MOD 30 MIN: CPT | Mod: GC

## 2023-05-04 PROCEDURE — 99214 OFFICE O/P EST MOD 30 MIN: CPT

## 2023-05-04 NOTE — PLAN
[FreeTextEntry1] : Urinary incontinence\par - order UA/UCx\par - urology referral\par \par HTN\par HLD\par - continue simvastatin\par - continue losartan, metoprolol, nifedipine, HCTZ\par \par Depression\par - continue sertraline\par \par DM\par - continue janumet and pioglitazone\par \par HCM\par - routine labs in 6 months and prn

## 2023-05-04 NOTE — HISTORY OF PRESENT ILLNESS
[FreeTextEntry1] : Right Flank Pain [de-identified] : 82 year old female patient known to have structural heart disease, Aortic stenosis, Asthma, HDL, HTN, DM, depression, and colon cancer s/p resection presents with symptoms of urge incontinence for the past month. Says occasionally she'll have urge to urinate but not be able to make it to the bathroom. No dysuria however.

## 2023-05-05 DIAGNOSIS — Z00.00 ENCOUNTER FOR GENERAL ADULT MEDICAL EXAMINATION WITHOUT ABNORMAL FINDINGS: ICD-10-CM

## 2023-05-05 DIAGNOSIS — E78.5 HYPERLIPIDEMIA, UNSPECIFIED: ICD-10-CM

## 2023-05-05 DIAGNOSIS — F32.A DEPRESSION, UNSPECIFIED: ICD-10-CM

## 2023-05-05 DIAGNOSIS — E11.9 TYPE 2 DIABETES MELLITUS WITHOUT COMPLICATIONS: ICD-10-CM

## 2023-05-05 DIAGNOSIS — I10 ESSENTIAL (PRIMARY) HYPERTENSION: ICD-10-CM

## 2023-06-27 NOTE — H&P ADULT - BIRTH SEX
Female Simponi Counseling:  I discussed with the patient the risks of golimumab including but not limited to myelosuppression, immunosuppression, autoimmune hepatitis, demyelinating diseases, lymphoma, and serious infections.  The patient understands that monitoring is required including a PPD at baseline and must alert us or the primary physician if symptoms of infection or other concerning signs are noted.

## 2023-07-09 ENCOUNTER — EMERGENCY (EMERGENCY)
Facility: HOSPITAL | Age: 83
LOS: 0 days | Discharge: HOME | End: 2023-07-10
Attending: EMERGENCY MEDICINE
Payer: MEDICARE

## 2023-07-09 VITALS
WEIGHT: 151.9 LBS | OXYGEN SATURATION: 95 % | HEART RATE: 77 BPM | TEMPERATURE: 97 F | SYSTOLIC BLOOD PRESSURE: 136 MMHG | DIASTOLIC BLOOD PRESSURE: 78 MMHG | RESPIRATION RATE: 18 BRPM

## 2023-07-09 DIAGNOSIS — M25.532 PAIN IN LEFT WRIST: ICD-10-CM

## 2023-07-09 DIAGNOSIS — Z96.0 PRESENCE OF UROGENITAL IMPLANTS: Chronic | ICD-10-CM

## 2023-07-09 DIAGNOSIS — Z91.018 ALLERGY TO OTHER FOODS: ICD-10-CM

## 2023-07-09 DIAGNOSIS — Z90.49 ACQUIRED ABSENCE OF OTHER SPECIFIED PARTS OF DIGESTIVE TRACT: Chronic | ICD-10-CM
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
## 2023-07-09 DIAGNOSIS — Z79.84 LONG TERM (CURRENT) USE OF ORAL HYPOGLYCEMIC DRUGS: ICD-10-CM

## 2023-07-09 DIAGNOSIS — Z96.651 PRESENCE OF RIGHT ARTIFICIAL KNEE JOINT: Chronic | ICD-10-CM

## 2023-07-09 DIAGNOSIS — S09.90XA UNSPECIFIED INJURY OF HEAD, INITIAL ENCOUNTER: ICD-10-CM
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
## 2023-07-09 DIAGNOSIS — Z98.891 HISTORY OF UTERINE SCAR FROM PREVIOUS SURGERY: Chronic | ICD-10-CM

## 2023-07-09 DIAGNOSIS — S52.601A UNSPECIFIED FRACTURE OF LOWER END OF RIGHT ULNA, INITIAL ENCOUNTER FOR CLOSED FRACTURE: ICD-10-CM

## 2023-07-09 DIAGNOSIS — S52.501A UNSPECIFIED FRACTURE OF THE LOWER END OF RIGHT RADIUS, INITIAL ENCOUNTER FOR CLOSED FRACTURE: ICD-10-CM

## 2023-07-09 PROCEDURE — 71045 X-RAY EXAM CHEST 1 VIEW: CPT

## 2023-07-09 PROCEDURE — 70450 CT HEAD/BRAIN W/O DYE: CPT | Mod: MA

## 2023-07-09 PROCEDURE — 73110 X-RAY EXAM OF WRIST: CPT | Mod: LT

## 2023-07-09 PROCEDURE — 72170 X-RAY EXAM OF PELVIS: CPT

## 2023-07-09 PROCEDURE — 99285 EMERGENCY DEPT VISIT HI MDM: CPT | Mod: 25

## 2023-07-09 PROCEDURE — 73090 X-RAY EXAM OF FOREARM: CPT | Mod: 26,LT

## 2023-07-09 PROCEDURE — 71045 X-RAY EXAM CHEST 1 VIEW: CPT | Mod: 26

## 2023-07-09 PROCEDURE — 72170 X-RAY EXAM OF PELVIS: CPT | Mod: 26

## 2023-07-09 PROCEDURE — 73100 X-RAY EXAM OF WRIST: CPT | Mod: LT

## 2023-07-09 PROCEDURE — 29125 APPL SHORT ARM SPLINT STATIC: CPT

## 2023-07-09 PROCEDURE — 70450 CT HEAD/BRAIN W/O DYE: CPT | Mod: 26,MA

## 2023-07-09 PROCEDURE — 72125 CT NECK SPINE W/O DYE: CPT | Mod: 26,MA

## 2023-07-09 PROCEDURE — 72125 CT NECK SPINE W/O DYE: CPT | Mod: MA

## 2023-07-09 PROCEDURE — 73090 X-RAY EXAM OF FOREARM: CPT | Mod: LT

## 2023-07-09 PROCEDURE — 99284 EMERGENCY DEPT VISIT MOD MDM: CPT | Mod: 25

## 2023-07-09 PROCEDURE — 73110 X-RAY EXAM OF WRIST: CPT | Mod: 26,LT

## 2023-07-09 PROCEDURE — 73130 X-RAY EXAM OF HAND: CPT | Mod: 26,LT

## 2023-07-09 PROCEDURE — 29125 APPL SHORT ARM SPLINT STATIC: CPT | Mod: LT

## 2023-07-09 PROCEDURE — 73130 X-RAY EXAM OF HAND: CPT | Mod: LT

## 2023-07-09 RX ORDER — ACETAMINOPHEN 500 MG
650 TABLET ORAL ONCE
Refills: 0 | Status: COMPLETED | OUTPATIENT
Start: 2023-07-09 | End: 2023-07-09

## 2023-07-09 RX ADMIN — Medication 650 MILLIGRAM(S): at 22:57

## 2023-07-09 NOTE — ED PROVIDER NOTE - PHYSICAL EXAMINATION
CONSTITUTIONAL: Well-developed; well-nourished; in no acute distress. gcs 15, speaking in full sentences, moving all extremities  SKIN: warm, dry  HEAD: Normocephalic; see above  EYES: PERRL, EOMI, no conjunctival erythema  ENT: No nasal discharge; airway clear, mucous membranes moist  CARD: +S1, S2 no murmurs, gallops, or rubs. Regular rate and rhythm. radial 2+ b/l, no chest wall tenderness or crepitus  RESP: No wheezes, rales or rhonchi. CTABL  ABD: soft ntnd, no rebound, no guarding, no rigidity  EXT: moves all extremities,  No clubbing, cyanosis or edema.   NEURO: Alert, oriented, grossly unremarkable, cn ii-xii grossly intact, follows commands  PSYCH: Cooperative, appropriate.

## 2023-07-09 NOTE — ED PROVIDER NOTE - PROVIDER TOKENS
[place] ambulatory encounter  ORTHOPEDIC HISTORY AND PHYSICAL    CHIEF COMPLAINT:  Follow-up (left knee pain)       SUBJECTIVE:  I saw the patient and have collaborated the history with SEJAL Conley as documented.    Review of systems:    Systems reviewed and negative except as documented in the HPI.    PROBLEM LIST:  Patient Active Problem List   Diagnosis   • DJD bilateral knees   • Chronic low back pain with sciatica   • Sciatica   • GERD (gastroesophageal reflux disease)   • Intercostal muscle pain   • Abnormality of gait   • Dizziness and giddiness   • Peripheral vertigo, unspecified   • Chronic neck pain   • Costochondritis   • Anemia   • Osteoarthritis, generalized   • Urge incontinence of urine   • Encounter for long-term (current) use of high-risk medication   • Status post left foot surgery   • Chronic pain of left knee   • Constipation due to opioid therapy   • Pain--Interventional Pain Management   • Primary osteoarthritis of left knee   • Bronchospasm        HISTORIES:  Current Outpatient Prescriptions   Medication   • ferrous gluconate (FERGON) 324 (38 Fe) MG tablet   • amoxicillin-clavulanate (AUGMENTIN) 875-125 MG per tablet   • oxyCODONE/APAP (PERCOCET) 5-325 MG per tablet   • furosemide (LASIX) 20 MG tablet   • albuterol 108 (90 Base) MCG/ACT inhaler   • docusate sodium-sennosides (SENOKOT S) 50-8.6 MG per tablet   • naproxen sodium (ALEVE) 220 MG tablet   • Elastic Bandages & Supports (MEDICAL COMPRESSION STOCKINGS) Misc   • diphenhydrAMINE (BENADRYL) 25 MG capsule   • omeprazole 20 MG tablet   • CALCIUM PO   • polyethylene glycol (MIRALAX) powder     No current facility-administered medications for this visit.      ALLERGIES:   Allergen Reactions   • Bactrim Nausea & Vomiting     fainting       OBJECTIVE:  PHYSICAL EXAM-    Vitals:   Visit Vitals  Resp 16   Ht 5' 4\" (1.626 m)   Wt 97.4 kg   LMP 09/11/2014   BMI 36.86 kg/m²      Constitutional:  Well-developed, well-nourished female in no  acute distress.  Skin: Warm, dry, intact without rash or lesion.  No subcutaneous masses.  HEENT:  Normocephalic.  Hearing intact.  Vision intact.  Psych:  Alert & oriented x 3.  Mood and insight appropriate.  CV:  Pulse is regular.  No significant pitting edema or lymphedema.   Resp:  Respiratory effort within appropriate limits.    Abdomen:  No abnormal distension.  Neuro:  No gross sensory deficits.  Spine:  No gross curvature of spine.  Appropriate mobility without instability.  No gross pelvic obliquity.  Musculoskeletal:  I examined the patient and have collaborated the examination with SEJAL Conley as documented.  Incision c/d/i.  Knee stable.  Non-antalgic gait.    IMAGING STUDIES:     See below.    ASSESSMENT:    1. Status post total left knee replacement        PLAN:    · I examined the patient's knee. I reviewed x-rays today. I discussed risks and benefits of the treatment options with the patient.  No specific activity restrictions.  Antibiotics before dental work.  Follow up in 1 year.  · Return in about 1 year (around 3/14/2019).    Orders Placed This Encounter   • XR Knee 3 View Left       Instructions provided as documented in the AVS.    The patient indicated understanding of the diagnosis and agreed with the plan of care.      PROVIDER:[TOKEN:[[id number]],FOLLOWUP:[1-3 Days]]

## 2023-07-09 NOTE — ED PROVIDER NOTE - CARE PROVIDER_API CALL
Virgilio Appiah  Orthopaedic Surgery  [address] [place] [address]  Phone: [phone]  Fax: [phone]  Follow Up Time: 1-3 Days

## 2023-07-09 NOTE — ED PROVIDER NOTE - OBJECTIVE STATEMENT
83-year-old female here with fall.  Patient slipped and tripped onto outstretched hand.  Patient complains of head positive head trauma no LOC no AC.  Complaining of pain to left wrist and front of head.  No other complaints.  Patient ambulated after injury.  Here for further eval.

## 2023-07-09 NOTE — ED PROVIDER NOTE - ATTENDING CONTRIBUTION TO CARE
83-year-old female, past medical history as documented, presenting status post mechanical fall while cleaning prior to arrival.  Patient states that she tripped and fall on outstretched left hand, injuring her left wrist.  Also hit her head, but no LOC.  Complaining only of frontal head pain as well as left wrist pain.  Denies any other complaints.    Vital Signs: I have reviewed the initial vital signs.  Constitutional: NAD, well-nourished, appears stated age, no acute distress.  HEENT: Airway patent, moist MM, no erythema/swelling/deformity of oral structures. EOMI, PERRLA.  CV: regular rate, regular rhythm, well-perfused extremities, 2+ b/l DP and radial pulses equal.  Lungs: BCTA, no increased WOB.  ABD: NTND, no guarding or rebound, no pulsatile mass, no hernias.   MSK: Neck supple, nontender, nl ROM, no stepoff. Chest nontender. Back nontender in TLS spine or to b/l bony structures or flanks. (+) L wrist deformity but otherwise ext nontender, nl rom, no deformity.   INTEG: Skin warm, dry, no rash.  NEURO: A&Ox3, normal strength, nl sensation throughout, normal speech.   PSYCH: Calm, cooperative, normal affect and interaction.    Will obtain CT head and C-spine, x-ray of the left wrist, pain control as needed, reeval.

## 2023-07-09 NOTE — ED PROVIDER NOTE - CLINICAL SUMMARY MEDICAL DECISION MAKING FREE TEXT BOX
83-year-old female presented to the ED for FOOSH injury.  Left wrist pain and deformity noted.  Distal pulses present.  Sensation intact.   strength is 5 out of 5.  X-ray revealed acute comminuted fracture of the distal radius and distal ulna.  Traction was applied to extremity and splinted.  With alignment of the radius and mildly displaced ulnar styloid.  Reevaluation of the extremity after splinting within normal limits.  With sensation and  strength intact.  Discharged home with orthopedic follow-up.  Remainder the imaging unremarkable for trauma work-up.

## 2023-07-09 NOTE — ED ADULT TRIAGE NOTE - CHIEF COMPLAINT QUOTE
pt with fall on outstretched hand while cleaning house  states (+) head injury, denies blood thinners, denies LOC  c/o left wrist pain

## 2023-07-09 NOTE — ED PROVIDER NOTE - PATIENT PORTAL LINK FT
You can access the FollowMyHealth Patient Portal offered by [facility] by registering at the following website: [email or web address]. By joining [employer]’s FollowMyHealth portal, you will also be able to view your health information using other applications (apps) compatible with our system.

## 2023-07-09 NOTE — ED PROVIDER NOTE - NSFOLLOWUPINSTRUCTIONS_ED_ALL_ED_FT
Wrist Fracture Treated With Immobilization    A wrist fracture is a break or crack in one of the bones of your wrist. Your wrist is made of eight small bones at the palm of your hand (carpal bones) and two long bones of the forearm (radius and ulna).    A broken wrist is often treated by wearing a cast, splint, or sling (immobilization). This holds the broken pieces in place so they can heal.    What are the causes?  A direct force to the wrist.  Trauma, such as a car crash or falling on an outstretched hand.  What increases the risk?  Doing contact sports or high-risk sports such as skiing, biking, and ice skating.  Taking steroid medicines.  Smoking.  Being female.  Being .  Drinking more than three drinks that contain alcohol a day.  Having a condition that weakens the bones (osteoporosis).  Being older.  Having had other broken bones in the past.  What are the signs or symptoms?  Pain.  Swelling.  Bruising.  Not being able to move the wrist normally.  The wrist hanging in an odd position or looking oddly shaped.  How is this treated?  Treatment involves wearing a cast, splint, or sling. You may also need to take pain medicine. Once the injury has healed enough, you may begin doing range-of-motion exercises.    Follow these instructions at home:  If you have a cast:    Do not stick anything inside the cast to scratch your skin.  Check the skin around the cast every day. Tell your doctor if you see problems.  You may put lotion on dry skin around the cast. Do not put lotion on the skin under the cast.  Keep the cast clean and dry.  If you have a splint or sling:    Wear the splint or sling as told by your doctor. Take it off only as told by your doctor.  Loosen the splint or sling if your fingers:  Tingle.  Become numb.  Turn cold and blue.  Keep the splint or sling clean and dry.  Bathing    Do not take baths, swim, or use a hot tub. Ask your doctor about taking showers or sponge baths.  If your cast, splint, or sling is not waterproof:  Do not let it get wet.  Cover it with a watertight covering when you take a bath or shower.  If you have a sling, take it off for bathing only if your doctor says this is okay.  Managing pain, stiffness, and swelling      If told, put ice on the injured area. To do this:  If you have a removable splint or sling, take it off as told by your doctor.  Put ice in a plastic bag.  Place a towel between your skin and the bag or between your cast and the bag.  Leave the ice on for 20 minutes, 2–3 times a day.  Take off the ice if your skin turns bright red. This is very important. If you cannot feel pain, heat, or cold, you have a greater risk of damage to the area.  Move your fingers often.  Raise the injured area above the level of your heart while you are sitting or lying down.  Activity    Return to your normal activities when your doctor says that it is safe.  Do not lift with or put weight on your injured wrist until your doctor says this is okay.  Ask your doctor when it is safe to drive if you have a cast, splint, or sling on your wrist.  Do range-of-motion exercises only as told by your doctor.  Medicines    Take over-the-counter and prescription medicines only as told by your doctor.  Ask your doctor if you should avoid driving or using machines while you are taking your medicine.  General instructions    Do not put pressure on any part of the cast or splint until it is fully hardened.  Do not smoke or use any products that contain nicotine or tobacco. If you need help quitting, ask your doctor.  If told, take steps to prevent problems with pooping (constipation). You may need to:  Drink enough fluid to keep your pee (urine) pale yellow.  Take medicines. You will be told what medicines to take.  Eat foods that are high in fiber. These include beans, whole grains, and fresh fruits and vegetables.  Limit foods that are high in fat and sugar. These include fried or sweet foods.  Keep all follow-up visits.  Contact a doctor if:  Your cast, splint, or sling is damaged or loose.  You have any new pain, swelling, or bruising.  Your pain, swelling, and bruising do not get better.  You have a fever.  You have chills.  Get help right away if:  Your skin or fingers on your injured arm turn blue or gray.  Your arm feels cold or gets numb.  You have very bad pain in your injured wrist.  Summary  A wrist fracture is a break or crack in one of the bones of your wrist.  A broken wrist is often treated by wearing a cast, splint, or sling.  You should check the skin around a cast every day.  Take off a splint or sling only as told by your doctor.  This information is not intended to replace advice given to you by your health care provider. Make sure you discuss any questions you have with your health care provider.

## 2023-07-10 PROCEDURE — 73100 X-RAY EXAM OF WRIST: CPT | Mod: 26,77,LT

## 2023-07-10 PROCEDURE — 73110 X-RAY EXAM OF WRIST: CPT | Mod: 26,LT

## 2023-07-13 ENCOUNTER — APPOINTMENT (OUTPATIENT)
Dept: ORTHOPEDIC SURGERY | Facility: CLINIC | Age: 83
End: 2023-07-13
Payer: MEDICARE

## 2023-07-13 VITALS — HEIGHT: 59 IN | WEIGHT: 152 LBS | BODY MASS INDEX: 30.64 KG/M2

## 2023-07-13 PROCEDURE — 99203 OFFICE O/P NEW LOW 30 MIN: CPT | Mod: 25

## 2023-07-13 PROCEDURE — 29075 APPL CST ELBW FNGR SHORT ARM: CPT | Mod: LT

## 2023-07-13 PROCEDURE — 73110 X-RAY EXAM OF WRIST: CPT | Mod: LT

## 2023-07-13 NOTE — IMAGING
[de-identified] : On examination of the left wrist, patient is in a sugar-tong splint, this was removed, patient has generalized swelling over the wrist, mild deformity.\par Patient has pain with palpation over the distal radius.\par Patient has decreased range of motion due to fracture.\par Able to range her digits with end of range pain.\par Neurovascular intact.\par \par X-rays of the left wrist were reviewed in office today, these x-rays were taken in July 9, 2023 showing a comminuted displaced fracture over the distal radius, this fracture has significant displacement, fracture was reduced at the hospital with significant improvement of the alignment of the fracture, she was splinted and advised to follow-up with orthopedic.\par \par Post casting x-rays in office today, these x-rays shows a well aligned distal radius fracture.\par

## 2023-07-13 NOTE — DISCUSSION/SUMMARY
[de-identified] : Impression: Left displaced distal radius fracture\par \par Plan: Since the fracture she will significant improved alignment of the fracture and in an acceptable alignment, patient was advised to place her in a short arm cast.\par Repeat x-rays were taken after casting, x-ray shows a well aligned distal radius fracture.\par Since patient has shows some stability along the fracture, patient was advised to follow-up in 2 weeks with a repeat x-rays with Dr. Appiah\par \par Follow-up: 2 weeks .\par \par

## 2023-07-13 NOTE — HISTORY OF PRESENT ILLNESS
[de-identified] : 83-year-old female here for an evaluation of injury sustained to the left wrist, patient states that this injury happened on July 9, 2023, she states that she was sitting keep getting up and tripped and fell down, and she landed on her wrist, she was seen at the emergency room, patient states that her fracture was reduced at the hospital, she was a splinted and advised to follow-up with orthopedic.\par Right-hand-dominant\par \par Patient states that she has a past medical history of arthritis, colon cancer, kidney stone, diabetes, hypertension, asthma.\par \par Patient medications are metoprolol, simvastatin, sertraline, hydrochlorothiazide, pioglitazone, Janumet, nifedipine, losartan, potassium.\par \par Denies any allergy to medication.

## 2023-07-31 ENCOUNTER — APPOINTMENT (OUTPATIENT)
Dept: ORTHOPEDIC SURGERY | Facility: CLINIC | Age: 83
End: 2023-07-31
Payer: MEDICARE

## 2023-07-31 PROCEDURE — 73110 X-RAY EXAM OF WRIST: CPT | Mod: LT

## 2023-07-31 PROCEDURE — 99214 OFFICE O/P EST MOD 30 MIN: CPT | Mod: 25

## 2023-07-31 NOTE — PHYSICAL EXAM
[de-identified] : Patient has swelling of the left wrist and hand.  She has good but limited motion in the left hand.  She is good range of motion to the elbow and shoulder.  Her cast is in good condition.

## 2023-07-31 NOTE — ASSESSMENT
[FreeTextEntry1] : Patient is healing her fracture though there appears to be some delayed union going on.  I want to keep her in a cast with another couple of weeks.  She will then see us back with cast off radiographs of the left wrist.  If she does not have good healing at her next visit vitamin D work-up should be done

## 2023-07-31 NOTE — DATA REVIEWED
[FreeTextEntry1] : Radiographs 3 views of the left wrist are reviewed showing good position alignment but there is actually gapping at the fracture site.

## 2023-07-31 NOTE — HISTORY OF PRESENT ILLNESS
[de-identified] : 83-year-old female had a left-sided wrist fracture she comes in today for evaluation and she is in a cast.  She is also in a sling.

## 2023-08-16 NOTE — ED PROCEDURE NOTE - CPROC ED COMPLICATIONS1
August 16, 2023     Patient: Butch Chavez   YOB: 1950   Date of Visit: 8/16/2023       To Whom It May Concern:    It is my medical opinion that Butch Chavez may return to work on 8/18/23.          Sincerely,        Cody Tong, DO     no

## 2023-08-21 ENCOUNTER — APPOINTMENT (OUTPATIENT)
Dept: ORTHOPEDIC SURGERY | Facility: CLINIC | Age: 83
End: 2023-08-21
Payer: MEDICARE

## 2023-08-21 PROCEDURE — 73110 X-RAY EXAM OF WRIST: CPT | Mod: LT

## 2023-08-21 PROCEDURE — 99213 OFFICE O/P EST LOW 20 MIN: CPT | Mod: 25

## 2023-08-21 NOTE — ASSESSMENT
[FreeTextEntry1] : I took her out of immobilization and placed in a removable wrist brace she will start therapy for range of motion stretching strengthening.  Needs range of motion to help with the stiffness of the fingers.  She should see me back in 4 to 6 weeks with a repeat radiograph of the left wrist

## 2023-08-21 NOTE — DATA REVIEWED
[FreeTextEntry1] : Radiographs 3 views of the left wrist reviewed showing good position alignment and healing but incomplete healing of the left wrist fracture.

## 2023-08-21 NOTE — HISTORY OF PRESENT ILLNESS
[de-identified] : 83-year-old female left-sided wrist fracture.  Is been about 6 weeks since injury.  She comes in the office today for evaluation.  She has been in the cast.

## 2023-08-21 NOTE — PHYSICAL EXAM
[de-identified] : Patient is removed from immobilization.  She has stiffness of the fingers and wrist.  There is mild swelling present there is normal sensation normal cap refill.

## 2023-09-19 ENCOUNTER — APPOINTMENT (OUTPATIENT)
Dept: ORTHOPEDIC SURGERY | Facility: CLINIC | Age: 83
End: 2023-09-19
Payer: MEDICARE

## 2023-09-19 PROCEDURE — 73110 X-RAY EXAM OF WRIST: CPT | Mod: LT

## 2023-09-19 PROCEDURE — 99213 OFFICE O/P EST LOW 20 MIN: CPT | Mod: 25

## 2023-10-31 ENCOUNTER — APPOINTMENT (OUTPATIENT)
Dept: INTERNAL MEDICINE | Facility: CLINIC | Age: 83
End: 2023-10-31
Payer: MEDICARE

## 2023-10-31 ENCOUNTER — OUTPATIENT (OUTPATIENT)
Dept: OUTPATIENT SERVICES | Facility: HOSPITAL | Age: 83
LOS: 1 days | End: 2023-10-31
Payer: MEDICARE

## 2023-10-31 VITALS
OXYGEN SATURATION: 100 % | WEIGHT: 156 LBS | HEIGHT: 59 IN | SYSTOLIC BLOOD PRESSURE: 127 MMHG | TEMPERATURE: 98.3 F | HEART RATE: 63 BPM | DIASTOLIC BLOOD PRESSURE: 84 MMHG | BODY MASS INDEX: 31.45 KG/M2

## 2023-10-31 DIAGNOSIS — G47.00 INSOMNIA, UNSPECIFIED: ICD-10-CM

## 2023-10-31 DIAGNOSIS — S52.552D OTHER EXTRAARTICULAR FRACTURE OF LOWER END OF LEFT RADIUS, SUBSEQUENT ENCOUNTER FOR CLOSED FRACTURE WITH ROUTINE HEALING: ICD-10-CM

## 2023-10-31 DIAGNOSIS — R42 DIZZINESS AND GIDDINESS: ICD-10-CM
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
## 2023-10-31 DIAGNOSIS — Z90.49 ACQUIRED ABSENCE OF OTHER SPECIFIED PARTS OF DIGESTIVE TRACT: Chronic | ICD-10-CM

## 2023-10-31 DIAGNOSIS — Z98.891 HISTORY OF UTERINE SCAR FROM PREVIOUS SURGERY: Chronic | ICD-10-CM

## 2023-10-31 DIAGNOSIS — I10 ESSENTIAL (PRIMARY) HYPERTENSION: ICD-10-CM

## 2023-10-31 DIAGNOSIS — S59.202A: ICD-10-CM

## 2023-10-31 DIAGNOSIS — Z96.651 PRESENCE OF RIGHT ARTIFICIAL KNEE JOINT: Chronic | ICD-10-CM

## 2023-10-31 DIAGNOSIS — Z96.0 PRESENCE OF UROGENITAL IMPLANTS: Chronic | ICD-10-CM
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
## 2023-10-31 DIAGNOSIS — F32.A DEPRESSION, UNSPECIFIED: ICD-10-CM

## 2023-10-31 DIAGNOSIS — E78.5 HYPERLIPIDEMIA, UNSPECIFIED: ICD-10-CM

## 2023-10-31 DIAGNOSIS — Z00.00 ENCOUNTER FOR GENERAL ADULT MEDICAL EXAMINATION WITHOUT ABNORMAL FINDINGS: ICD-10-CM

## 2023-10-31 PROCEDURE — 99214 OFFICE O/P EST MOD 30 MIN: CPT

## 2023-10-31 RX ORDER — GLUCOSAMINE HCL/CHONDROITIN SU 500-400 MG
3 CAPSULE ORAL AT BEDTIME
Qty: 30 | Refills: 0 | Status: ACTIVE | COMMUNITY
Start: 2023-08-31 | End: 1900-01-01

## 2023-11-01 DIAGNOSIS — F32.A DEPRESSION, UNSPECIFIED: ICD-10-CM

## 2023-11-01 DIAGNOSIS — S52.552D OTHER EXTRAARTICULAR FRACTURE OF LOWER END OF LEFT RADIUS, SUBSEQUENT ENCOUNTER FOR CLOSED FRACTURE WITH ROUTINE HEALING: ICD-10-CM

## 2023-11-01 DIAGNOSIS — J45.909 UNSPECIFIED ASTHMA, UNCOMPLICATED: ICD-10-CM

## 2023-11-01 DIAGNOSIS — E78.5 HYPERLIPIDEMIA, UNSPECIFIED: ICD-10-CM

## 2023-11-01 DIAGNOSIS — G47.00 INSOMNIA, UNSPECIFIED: ICD-10-CM

## 2023-11-01 DIAGNOSIS — Z00.00 ENCOUNTER FOR GENERAL ADULT MEDICAL EXAMINATION WITHOUT ABNORMAL FINDINGS: ICD-10-CM

## 2023-11-01 DIAGNOSIS — E11.9 TYPE 2 DIABETES MELLITUS WITHOUT COMPLICATIONS: ICD-10-CM

## 2023-11-01 DIAGNOSIS — S59.202A UNSPECIFIED PHYSEAL FRACTURE OF LOWER END OF RADIUS, LEFT ARM, INITIAL ENCOUNTER FOR CLOSED FRACTURE: ICD-10-CM

## 2023-11-01 DIAGNOSIS — I10 ESSENTIAL (PRIMARY) HYPERTENSION: ICD-10-CM

## 2023-11-08 NOTE — PATIENT PROFILE ADULT - NSPROPTRIGHTBILLOFRIGHTS_GEN_A_NUR
Thank you for choosing [facility]!    DR HIRSCH'S CLINIC:  [facility]   [id number]   [place] [address]      TRIAGE LINE: [phone]  APPOINTMENTS: [phone]  RADIOLOGY: [phone]  SET UP SURGERY: [phone]  PHYSICAL THERAPY: [phone]   FAX NUMBER: [phone]  BILLING QUESTIONS: [phone]       Follow up: 1 month         
patient

## 2024-01-11 NOTE — ED ADULT NURSE NOTE - CHIEF COMPLAINT
This is a 41 year old Black/ male here today for   Chief Complaint   Patient presents with    Ear Problem     Right ear pain        HISTORY OF PRESENT ILLNESS:  Patient presents with several concerns.  He reports he developed ear pain two days ago.  He said over the last two days it has progressively gotten worse.  He denies congestion and fevers. He has not taken any medication to help with symptoms.    He also reports some ongoing gum pain.  He saw a dentist in July and needs to follow up with them, but has not been able to get an appt.  He reports that he brushes twice a day, he was not sure if it is connected to his sinuses.  No lesions or sores present.       I have reviewed the patient's medications and allergies, past medical, surgical, social and family history, updating these as appropriate.  See Histories section of the EMR (electronic medical record) for a display of this information.    REVIEW OF SYSTEMS: As modified in the HPI (history of present illness), otherwise: negative   PHYSICAL EXAM:         Vital Signs: Visit Vitals  /64 (BP Location: RUE - Right upper extremity, Patient Position: Sitting, Cuff Size: Regular)   Pulse 64   Temp 98.1 °F (36.7 °C) (Temporal)   Resp 16   Ht 6' 1\" (1.854 m)   Wt 130 kg (286 lb 9.6 oz)   BMI 37.81 kg/m²      Physical Exam  Constitutional:       Appearance: Normal appearance.   HENT:      Right Ear: A middle ear effusion is present. Tympanic membrane is erythematous and bulging.      Left Ear: A middle ear effusion is present.      Nose: Rhinorrhea present.      Mouth/Throat:      Dentition: Dental tenderness present. No gum lesions.      Pharynx: Oropharynx is clear.   Eyes:      Conjunctiva/sclera: Conjunctivae normal.   Cardiovascular:      Rate and Rhythm: Normal rate.      Pulses: Normal pulses.   Pulmonary:      Effort: Pulmonary effort is normal.   Skin:     General: Skin is warm and dry.   Neurological:      Mental Status: He is alert.            LAB:   Lab Services on 12/18/2023   Component Date Value Ref Range Status    BKV Quantitative Viral Load 12/18/2023 Not Detected  Not Detected Final    BKV Quantitative Log 12/18/2023 Not Detected  Not Detected Final    Procedural Notes 12/18/2023    Final                    Value:This result contains rich text formatting which cannot be displayed here.    Fasting Status 12/18/2023 12  0 - 999 Hours Final    Sodium 12/18/2023 141  135 - 145 mmol/L Final    Potassium 12/18/2023 4.5  3.4 - 5.1 mmol/L Final    Chloride 12/18/2023 110  97 - 110 mmol/L Final    Carbon Dioxide 12/18/2023 23  21 - 32 mmol/L Final    Anion Gap 12/18/2023 13  7 - 19 mmol/L Final    Glucose 12/18/2023 112 (H)  70 - 99 mg/dL Final    BUN 12/18/2023 28 (H)  6 - 20 mg/dL Final    Creatinine 12/18/2023 1.67 (H)  0.67 - 1.17 mg/dL Final    Glomerular Filtration Rate 12/18/2023 53 (L)  >=60 Final    BUN/Cr 12/18/2023 17  7 - 25 Final    Calcium 12/18/2023 9.6  8.4 - 10.2 mg/dL Final    Tacrolimus 12/18/2023 7.9  5.0 - 20.0 ng/mL Final    Cholesterol 12/18/2023 224 (H)  <=199 mg/dL Final    Triglycerides 12/18/2023 144  <=149 mg/dL Final    HDL 12/18/2023 56  >=40 mg/dL Final    LDL 12/18/2023 139 (H)  <=129 mg/dL Final    Non-HDL Cholesterol 12/18/2023 168  mg/dL Final    Cholesterol/ HDL Ratio 12/18/2023 4.0  <=4.4 Final    Hemoglobin A1C 12/18/2023 6.0 (H)  4.5 - 5.6 % Final    WBC 12/18/2023 6.1  4.2 - 11.0 K/mcL Final    RBC 12/18/2023 4.92  4.50 - 5.90 mil/mcL Final    HGB 12/18/2023 13.2  13.0 - 17.0 g/dL Final    HCT 12/18/2023 43.6  39.0 - 51.0 % Final    MCV 12/18/2023 88.6  78.0 - 100.0 fl Final    MCH 12/18/2023 26.8  26.0 - 34.0 pg Final    MCHC 12/18/2023 30.3 (L)  32.0 - 36.5 g/dL Final    RDW-CV 12/18/2023 14.9  11.0 - 15.0 % Final    RDW-SD 12/18/2023 48.5  39.0 - 50.0 fL Final    PLT 12/18/2023 291  140 - 450 K/mcL Final    NRBC 12/18/2023 0  <=0 /100 WBC Final    Neutrophil, Percent 12/18/2023 81  % Final    Lymphocytes,  Percent 12/18/2023 10  % Final    Mono, Percent 12/18/2023 8  % Final    Eosinophils, Percent 12/18/2023 1  % Final    Basophils, Percent 12/18/2023 0  % Final    Immature Granulocytes 12/18/2023 0  % Final    Absolute Neutrophils 12/18/2023 4.8  1.8 - 7.7 K/mcL Final    Absolute Lymphocytes 12/18/2023 0.6 (L)  1.0 - 4.8 K/mcL Final    Absolute Monocytes 12/18/2023 0.5  0.3 - 0.9 K/mcL Final    Absolute Eosinophils  12/18/2023 0.1  0.0 - 0.5 K/mcL Final    Absolute Basophils 12/18/2023 0.0  0.0 - 0.3 K/mcL Final    Absolute Immature Granulocytes 12/18/2023 0.0  0.0 - 0.2 K/mcL Final          ASSESSMENT AND PLAN:    Diagnoses and all orders for this visit:  Right non-suppurative otitis media  -     amoxicillin (AMOXIL) 500 MG tablet; Take 1 tablet by mouth in the morning and 1 tablet in the evening. Do all this for 10 days.       --discussed with patient to follow up with dental.      Patient's questions answered. Patient states understanding and agrees with plan of care. Denies further questions.     No follow-ups on file.    Patient Care Team:  Elvin Guzmán MD as PCP - General (Family Practice)    IVAN Dempsey       The patient is a 83y Female complaining of wrist pain/injury.

## 2024-03-19 ENCOUNTER — APPOINTMENT (OUTPATIENT)
Dept: CARDIOLOGY | Facility: CLINIC | Age: 84
End: 2024-03-19

## 2024-03-28 ENCOUNTER — OUTPATIENT (OUTPATIENT)
Dept: OUTPATIENT SERVICES | Facility: HOSPITAL | Age: 84
LOS: 1 days | End: 2024-03-28
Payer: MEDICARE

## 2024-03-28 ENCOUNTER — APPOINTMENT (OUTPATIENT)
Dept: INTERNAL MEDICINE | Facility: CLINIC | Age: 84
End: 2024-03-28
Payer: MEDICARE

## 2024-03-28 VITALS
BODY MASS INDEX: 31.65 KG/M2 | DIASTOLIC BLOOD PRESSURE: 74 MMHG | OXYGEN SATURATION: 98 % | TEMPERATURE: 97.5 F | HEART RATE: 64 BPM | SYSTOLIC BLOOD PRESSURE: 131 MMHG | WEIGHT: 157 LBS | HEIGHT: 59 IN

## 2024-03-28 DIAGNOSIS — Z00.00 ENCOUNTER FOR GENERAL ADULT MEDICAL EXAMINATION W/OUT ABNORMAL FINDINGS: ICD-10-CM

## 2024-03-28 DIAGNOSIS — Z00.00 ENCOUNTER FOR GENERAL ADULT MEDICAL EXAMINATION WITHOUT ABNORMAL FINDINGS: ICD-10-CM

## 2024-03-28 DIAGNOSIS — Z96.651 PRESENCE OF RIGHT ARTIFICIAL KNEE JOINT: Chronic | ICD-10-CM

## 2024-03-28 DIAGNOSIS — Z90.49 ACQUIRED ABSENCE OF OTHER SPECIFIED PARTS OF DIGESTIVE TRACT: Chronic | ICD-10-CM

## 2024-03-28 DIAGNOSIS — Z98.891 HISTORY OF UTERINE SCAR FROM PREVIOUS SURGERY: Chronic | ICD-10-CM

## 2024-03-28 DIAGNOSIS — J45.909 UNSPECIFIED ASTHMA, UNCOMPLICATED: ICD-10-CM

## 2024-03-28 DIAGNOSIS — Z96.0 PRESENCE OF UROGENITAL IMPLANTS: Chronic | ICD-10-CM

## 2024-03-28 DIAGNOSIS — E11.9 TYPE 2 DIABETES MELLITUS W/OUT COMPLICATIONS: ICD-10-CM

## 2024-03-28 DIAGNOSIS — E55.9 VITAMIN D DEFICIENCY, UNSPECIFIED: ICD-10-CM

## 2024-03-28 LAB
ALBUMIN SERPL ELPH-MCNC: 4.4 G/DL
ALP BLD-CCNC: 60 U/L
ALT SERPL-CCNC: 9 U/L
ANION GAP SERPL CALC-SCNC: 14 MMOL/L
AST SERPL-CCNC: 15 U/L
BILIRUB SERPL-MCNC: 0.2 MG/DL
BUN SERPL-MCNC: 29 MG/DL
CALCIUM SERPL-MCNC: 9.8 MG/DL
CHLORIDE SERPL-SCNC: 100 MMOL/L
CHOLEST SERPL-MCNC: 149 MG/DL
CO2 SERPL-SCNC: 23 MMOL/L
CREAT SERPL-MCNC: 1 MG/DL
EGFR: 57 ML/MIN/1.73M2
ESTIMATED AVERAGE GLUCOSE: 177 MG/DL
GLUCOSE SERPL-MCNC: 125 MG/DL
HBA1C MFR BLD HPLC: 7.8 %
HCT VFR BLD CALC: 39 %
HDLC SERPL-MCNC: 73 MG/DL
HGB BLD-MCNC: 12.3 G/DL
LDLC SERPL CALC-MCNC: 45 MG/DL
MCHC RBC-ENTMCNC: 28.4 PG
MCHC RBC-ENTMCNC: 31.5 G/DL
MCV RBC AUTO: 90.1 FL
NONHDLC SERPL-MCNC: 76 MG/DL
PLATELET # BLD AUTO: 343 K/UL
PMV BLD AUTO: 0 /100 WBCS
PMV BLD: 10.5 FL
POTASSIUM SERPL-SCNC: 4.6 MMOL/L
PROT SERPL-MCNC: 7.5 G/DL
RBC # BLD: 4.33 M/UL
RBC # FLD: 14.6 %
SODIUM SERPL-SCNC: 137 MMOL/L
TRIGL SERPL-MCNC: 154 MG/DL
TSH SERPL-ACNC: 2.76 UIU/ML
WBC # FLD AUTO: 11.54 K/UL

## 2024-03-28 PROCEDURE — G2211 COMPLEX E/M VISIT ADD ON: CPT

## 2024-03-28 PROCEDURE — 84443 ASSAY THYROID STIM HORMONE: CPT

## 2024-03-28 PROCEDURE — 99214 OFFICE O/P EST MOD 30 MIN: CPT

## 2024-03-28 PROCEDURE — 83036 HEMOGLOBIN GLYCOSYLATED A1C: CPT

## 2024-03-28 PROCEDURE — 85027 COMPLETE CBC AUTOMATED: CPT

## 2024-03-28 PROCEDURE — 82306 VITAMIN D 25 HYDROXY: CPT

## 2024-03-28 PROCEDURE — 82043 UR ALBUMIN QUANTITATIVE: CPT

## 2024-03-28 PROCEDURE — 80061 LIPID PANEL: CPT

## 2024-03-28 PROCEDURE — 80053 COMPREHEN METABOLIC PANEL: CPT

## 2024-03-28 RX ORDER — SERTRALINE HYDROCHLORIDE 50 MG/1
50 TABLET, FILM COATED ORAL
Qty: 30 | Refills: 6 | Status: ACTIVE | COMMUNITY
Start: 2019-03-15 | End: 1900-01-01

## 2024-03-28 RX ORDER — FLUTICASONE PROPIONATE AND SALMETEROL 50; 250 UG/1; UG/1
250-50 POWDER RESPIRATORY (INHALATION)
Qty: 5 | Refills: 3 | Status: ACTIVE | COMMUNITY
Start: 2019-08-21 | End: 1900-01-01

## 2024-03-28 RX ORDER — ALBUTEROL SULFATE 2.5 MG/3ML
(2.5 MG/3ML) SOLUTION RESPIRATORY (INHALATION) EVERY 6 HOURS
Qty: 1 | Refills: 5 | Status: ACTIVE | COMMUNITY
Start: 2019-06-25 | End: 1900-01-01

## 2024-03-28 RX ORDER — PEN NEEDLE, DIABETIC 29 G X1/2"
31G X 5 MM NEEDLE, DISPOSABLE MISCELLANEOUS
Qty: 100 | Refills: 2 | Status: ACTIVE | COMMUNITY
Start: 2020-02-24 | End: 1900-01-01

## 2024-03-28 RX ORDER — BLOOD SUGAR DIAGNOSTIC
STRIP MISCELLANEOUS
Qty: 300 | Refills: 2 | Status: ACTIVE | COMMUNITY
Start: 2020-02-22 | End: 1900-01-01

## 2024-03-28 RX ORDER — MONTELUKAST 10 MG/1
10 TABLET, FILM COATED ORAL DAILY
Qty: 30 | Refills: 6 | Status: ACTIVE | COMMUNITY
Start: 2021-06-25 | End: 1900-01-01

## 2024-03-28 RX ORDER — LOSARTAN POTASSIUM 25 MG/1
25 TABLET, FILM COATED ORAL
Qty: 90 | Refills: 3 | Status: ACTIVE | COMMUNITY
Start: 2019-03-15 | End: 1900-01-01

## 2024-03-28 RX ORDER — SITAGLIPTIN AND METFORMIN HYDROCHLORIDE 50; 1000 MG/1; MG/1
50-1000 TABLET, FILM COATED, EXTENDED RELEASE ORAL
Qty: 30 | Refills: 6 | Status: ACTIVE | COMMUNITY
Start: 2021-10-13 | End: 1900-01-01

## 2024-03-28 RX ORDER — PIOGLITAZONE HYDROCHLORIDE 30 MG/1
30 TABLET ORAL DAILY
Qty: 90 | Refills: 3 | Status: ACTIVE | COMMUNITY
Start: 2018-04-11 | End: 1900-01-01

## 2024-03-28 RX ORDER — METOPROLOL SUCCINATE 50 MG/1
50 TABLET, EXTENDED RELEASE ORAL
Qty: 30 | Refills: 6 | Status: ACTIVE | COMMUNITY
Start: 2021-05-27 | End: 1900-01-01

## 2024-03-28 RX ORDER — BLOOD-GLUCOSE METER
W/DEVICE KIT MISCELLANEOUS
Qty: 1 | Refills: 0 | Status: ACTIVE | COMMUNITY
Start: 2020-02-19 | End: 1900-01-01

## 2024-03-28 NOTE — ASSESSMENT
[FreeTextEntry1] : 83-year-old female patient known to have Asthma, HDL, HTN, DM, depression, ?structural heart disease, and colon cancer s/p resection presents for follow up.  #HTN #HLD - BP today 131/74 - c/w losartan, thiazide 25mg, nifedipine 30mg, and metroprolol 50mg XL - continue simvastatin  #Depression - Continue sertraline - No suicidal ideation  #DM - A1c 7.7 in 2023 - Continue Janumet and pioglitazone - repeat a1c  #asthma  - c/w inhalers  #Possible structural heart disease - Following with cardio - Echo requested as per their note  #HCM - RTC in 3months with labs - Get BMD for osteoporosis

## 2024-03-28 NOTE — PHYSICAL EXAM
[No JVD] : no jugular venous distention [No Acute Distress] : no acute distress [No Respiratory Distress] : no respiratory distress  [Clear to Auscultation] : lungs were clear to auscultation bilaterally [Normal Rate] : normal rate  [Normal S1, S2] : normal S1 and S2 [No Edema] : there was no peripheral edema [Non Tender] : non-tender [Soft] : abdomen soft

## 2024-03-28 NOTE — HISTORY OF PRESENT ILLNESS
[de-identified] : Case of 83-year-old female patient known to have Asthma, HDL, HTN, DM, depression, ?structural heart disease, and colon cancer s/p resection presents for follow up. She feels down, seems like she has ongoing social and financial issues. Seen a therapist before. No suicidal ideation No other complaints  [FreeTextEntry1] : Follow up

## 2024-03-29 DIAGNOSIS — J45.909 UNSPECIFIED ASTHMA, UNCOMPLICATED: ICD-10-CM

## 2024-03-29 DIAGNOSIS — E55.9 VITAMIN D DEFICIENCY, UNSPECIFIED: ICD-10-CM

## 2024-03-29 DIAGNOSIS — Z00.00 ENCOUNTER FOR GENERAL ADULT MEDICAL EXAMINATION WITHOUT ABNORMAL FINDINGS: ICD-10-CM

## 2024-03-29 DIAGNOSIS — E11.9 TYPE 2 DIABETES MELLITUS WITHOUT COMPLICATIONS: ICD-10-CM

## 2024-03-29 LAB
25(OH)D3 SERPL-MCNC: 16 NG/ML
CREAT SPEC-SCNC: 125 MG/DL
MICROALBUMIN 24H UR DL<=1MG/L-MCNC: 3.3 MG/DL
MICROALBUMIN/CREAT 24H UR-RTO: 26 MG/G

## 2024-05-22 NOTE — PATIENT PROFILE ADULT - NSPROHMDIABETBLDGLCUSUALFT_GEN_A_NUR
5/22/2024 3:51 PM  [facility] hosp[facility] home patient requests PCA started for improved symptom management.  Filled electronically via Epic as per [place] State Law.    Requested Prescriptions     Signed Prescriptions Disp Refills    HYDROmorphone (Dilaudid) 2 mg/mL 100 mL PCA (Home Health only) 100 mL 0     Sig: Route of Administration: Subcutaneous;  Basal Rate: 0.3 mg/hr;  Bolus Dose: 0.5 mg;  Bolus Interval: 15 minutes;  Max Bolus Doses/hr: 4       ILEANA Trejo  [facility]  Hospice Answering Service: [phone]  You can find me on [employer]!      
high 100-low 200

## 2024-10-02 NOTE — ED ADULT TRIAGE NOTE - NS ED NURSE BANDS TYPE
No protocol for requested medication.    Medication: Tramadol  Last office visit date: 7/10/24  [id number] [place], [place] - [facility] [facility]    Order pended, routed to clinician for review.        PDMP reviewed; no aberrant behavior identified, prescription authorized.     MME:0      LAST FILL: 9/10/24   Name band;

## 2024-10-15 ENCOUNTER — APPOINTMENT (OUTPATIENT)
Dept: ORTHOPEDIC SURGERY | Facility: CLINIC | Age: 84
End: 2024-10-15

## 2024-10-25 ENCOUNTER — APPOINTMENT (OUTPATIENT)
Dept: INTERNAL MEDICINE | Facility: CLINIC | Age: 84
End: 2024-10-25
Payer: MEDICARE

## 2024-10-25 ENCOUNTER — OUTPATIENT (OUTPATIENT)
Dept: OUTPATIENT SERVICES | Facility: HOSPITAL | Age: 84
LOS: 1 days | End: 2024-10-25
Payer: MEDICARE

## 2024-10-25 VITALS
SYSTOLIC BLOOD PRESSURE: 127 MMHG | WEIGHT: 169 LBS | HEART RATE: 67 BPM | BODY MASS INDEX: 34.07 KG/M2 | DIASTOLIC BLOOD PRESSURE: 57 MMHG | OXYGEN SATURATION: 98 % | TEMPERATURE: 97.4 F | HEIGHT: 59 IN

## 2024-10-25 DIAGNOSIS — Z98.891 HISTORY OF UTERINE SCAR FROM PREVIOUS SURGERY: Chronic | ICD-10-CM

## 2024-10-25 DIAGNOSIS — Z00.00 ENCOUNTER FOR GENERAL ADULT MEDICAL EXAMINATION WITHOUT ABNORMAL FINDINGS: ICD-10-CM

## 2024-10-25 DIAGNOSIS — Z96.651 PRESENCE OF RIGHT ARTIFICIAL KNEE JOINT: Chronic | ICD-10-CM

## 2024-10-25 DIAGNOSIS — R10.811 RIGHT UPPER QUADRANT ABDOMINAL TENDERNESS: ICD-10-CM

## 2024-10-25 DIAGNOSIS — Z96.0 PRESENCE OF UROGENITAL IMPLANTS: Chronic | ICD-10-CM

## 2024-10-25 DIAGNOSIS — Z90.49 ACQUIRED ABSENCE OF OTHER SPECIFIED PARTS OF DIGESTIVE TRACT: Chronic | ICD-10-CM

## 2024-10-25 PROCEDURE — 99213 OFFICE O/P EST LOW 20 MIN: CPT

## 2024-10-25 PROCEDURE — G2211 COMPLEX E/M VISIT ADD ON: CPT

## 2024-10-25 RX ORDER — CLOBETASOL PROPIONATE 0.5 MG/G
0.05 CREAM TOPICAL TWICE DAILY
Qty: 3 | Refills: 3 | Status: ACTIVE | COMMUNITY
Start: 2024-10-25 | End: 1900-01-01

## 2024-10-30 DIAGNOSIS — R10.811 RIGHT UPPER QUADRANT ABDOMINAL TENDERNESS: ICD-10-CM

## 2024-11-05 ENCOUNTER — APPOINTMENT (OUTPATIENT)
Dept: OPHTHALMOLOGY | Facility: CLINIC | Age: 84
End: 2024-11-05
Payer: MEDICARE

## 2024-11-05 ENCOUNTER — OUTPATIENT (OUTPATIENT)
Dept: OUTPATIENT SERVICES | Facility: HOSPITAL | Age: 84
LOS: 1 days | End: 2024-11-05
Payer: MEDICARE

## 2024-11-05 DIAGNOSIS — H25.10 AGE-RELATED NUCLEAR CATARACT, UNSPECIFIED EYE: ICD-10-CM

## 2024-11-05 DIAGNOSIS — Z96.651 PRESENCE OF RIGHT ARTIFICIAL KNEE JOINT: Chronic | ICD-10-CM

## 2024-11-05 DIAGNOSIS — Z90.49 ACQUIRED ABSENCE OF OTHER SPECIFIED PARTS OF DIGESTIVE TRACT: Chronic | ICD-10-CM

## 2024-11-05 DIAGNOSIS — H04.129 DRY EYE SYNDROME OF UNSPECIFIED LACRIMAL GLAND: ICD-10-CM

## 2024-11-05 DIAGNOSIS — H34.8120 CENTRAL RETINAL VEIN OCCLUSION, LEFT EYE, WITH MACULAR EDEMA: ICD-10-CM

## 2024-11-05 DIAGNOSIS — H40.013 OPEN ANGLE WITH BORDERLINE FINDINGS, LOW RISK, BILATERAL: ICD-10-CM

## 2024-11-05 DIAGNOSIS — E11.9 TYPE 2 DIABETES MELLITUS WITHOUT COMPLICATIONS: ICD-10-CM

## 2024-11-05 DIAGNOSIS — H25.013 CORTICAL AGE-RELATED CATARACT, BILATERAL: ICD-10-CM

## 2024-11-05 DIAGNOSIS — H53.8 OTHER VISUAL DISTURBANCES: ICD-10-CM

## 2024-11-05 DIAGNOSIS — Z98.891 HISTORY OF UTERINE SCAR FROM PREVIOUS SURGERY: Chronic | ICD-10-CM

## 2024-11-05 PROCEDURE — 92133 CPTRZD OPH DX IMG PST SGM ON: CPT | Mod: 26

## 2024-11-05 PROCEDURE — 92004 COMPRE OPH EXAM NEW PT 1/>: CPT | Mod: 25

## 2024-11-05 PROCEDURE — 92133 CPTRZD OPH DX IMG PST SGM ON: CPT

## 2024-11-05 PROCEDURE — 92004 COMPRE OPH EXAM NEW PT 1/>: CPT

## 2024-11-20 ENCOUNTER — OUTPATIENT (OUTPATIENT)
Dept: OUTPATIENT SERVICES | Facility: HOSPITAL | Age: 84
LOS: 1 days | End: 2024-11-20
Payer: MEDICARE

## 2024-11-20 ENCOUNTER — RESULT REVIEW (OUTPATIENT)
Age: 84
End: 2024-11-20

## 2024-11-20 DIAGNOSIS — Z96.0 PRESENCE OF UROGENITAL IMPLANTS: Chronic | ICD-10-CM

## 2024-11-20 DIAGNOSIS — Z00.8 ENCOUNTER FOR OTHER GENERAL EXAMINATION: ICD-10-CM

## 2024-11-20 DIAGNOSIS — Z98.891 HISTORY OF UTERINE SCAR FROM PREVIOUS SURGERY: Chronic | ICD-10-CM

## 2024-11-20 DIAGNOSIS — Z90.49 ACQUIRED ABSENCE OF OTHER SPECIFIED PARTS OF DIGESTIVE TRACT: Chronic | ICD-10-CM

## 2024-11-20 DIAGNOSIS — Z96.651 PRESENCE OF RIGHT ARTIFICIAL KNEE JOINT: Chronic | ICD-10-CM

## 2024-11-20 PROCEDURE — 76705 ECHO EXAM OF ABDOMEN: CPT

## 2024-11-20 PROCEDURE — 76705 ECHO EXAM OF ABDOMEN: CPT | Mod: 26

## 2024-11-21 DIAGNOSIS — R10.9 UNSPECIFIED ABDOMINAL PAIN: ICD-10-CM

## 2024-12-21 NOTE — PATIENT PROFILE ADULT - PRIMARY ROLES/RESPONSIBILITIES
----- Message from JORDAN Powers sent at 4/19/2022 12:46 PM CDT -----  Thyroid function stable for amiodarone monitoring       Patient informed of results and verbalizes understanding. Patient wondering if results for his chest xray are available yet. Appears no report from radiologist or result note as of now. Updated patient that once radiologist reviews and we get results, will contact him with results. He verbalizes understanding, no further questions or concerns.      Spine appears normal, range of motion is not limited, no muscle or joint tenderness none

## 2025-05-06 ENCOUNTER — APPOINTMENT (OUTPATIENT)
Dept: OPHTHALMOLOGY | Facility: CLINIC | Age: 85
End: 2025-05-06

## 2025-06-26 ENCOUNTER — OUTPATIENT (OUTPATIENT)
Dept: OUTPATIENT SERVICES | Facility: HOSPITAL | Age: 85
LOS: 1 days | End: 2025-06-26
Payer: MEDICARE

## 2025-06-26 ENCOUNTER — NON-APPOINTMENT (OUTPATIENT)
Age: 85
End: 2025-06-26

## 2025-06-26 ENCOUNTER — APPOINTMENT (OUTPATIENT)
Dept: INTERNAL MEDICINE | Facility: CLINIC | Age: 85
End: 2025-06-26
Payer: MEDICARE

## 2025-06-26 VITALS
SYSTOLIC BLOOD PRESSURE: 135 MMHG | BODY MASS INDEX: 33.47 KG/M2 | WEIGHT: 166 LBS | DIASTOLIC BLOOD PRESSURE: 79 MMHG | OXYGEN SATURATION: 98 % | HEART RATE: 88 BPM | TEMPERATURE: 96.7 F | HEIGHT: 59 IN

## 2025-06-26 DIAGNOSIS — Z96.651 PRESENCE OF RIGHT ARTIFICIAL KNEE JOINT: Chronic | ICD-10-CM

## 2025-06-26 DIAGNOSIS — Z00.00 ENCOUNTER FOR GENERAL ADULT MEDICAL EXAMINATION WITHOUT ABNORMAL FINDINGS: ICD-10-CM

## 2025-06-26 DIAGNOSIS — Z96.0 PRESENCE OF UROGENITAL IMPLANTS: Chronic | ICD-10-CM

## 2025-06-26 DIAGNOSIS — Z98.891 HISTORY OF UTERINE SCAR FROM PREVIOUS SURGERY: Chronic | ICD-10-CM

## 2025-06-26 DIAGNOSIS — Z90.49 ACQUIRED ABSENCE OF OTHER SPECIFIED PARTS OF DIGESTIVE TRACT: Chronic | ICD-10-CM

## 2025-06-26 PROCEDURE — 99214 OFFICE O/P EST MOD 30 MIN: CPT

## 2025-06-26 PROCEDURE — G2211 COMPLEX E/M VISIT ADD ON: CPT

## 2025-06-27 ENCOUNTER — NON-APPOINTMENT (OUTPATIENT)
Age: 85
End: 2025-06-27

## 2025-06-30 ENCOUNTER — OUTPATIENT (OUTPATIENT)
Dept: OUTPATIENT SERVICES | Facility: HOSPITAL | Age: 85
LOS: 1 days | End: 2025-06-30
Payer: MEDICARE

## 2025-06-30 DIAGNOSIS — Z96.651 PRESENCE OF RIGHT ARTIFICIAL KNEE JOINT: Chronic | ICD-10-CM

## 2025-06-30 DIAGNOSIS — Z96.0 PRESENCE OF UROGENITAL IMPLANTS: Chronic | ICD-10-CM

## 2025-06-30 DIAGNOSIS — Z98.891 HISTORY OF UTERINE SCAR FROM PREVIOUS SURGERY: Chronic | ICD-10-CM

## 2025-06-30 DIAGNOSIS — Z90.49 ACQUIRED ABSENCE OF OTHER SPECIFIED PARTS OF DIGESTIVE TRACT: Chronic | ICD-10-CM

## 2025-06-30 DIAGNOSIS — Z00.00 ENCOUNTER FOR GENERAL ADULT MEDICAL EXAMINATION WITHOUT ABNORMAL FINDINGS: ICD-10-CM

## 2025-06-30 LAB
ALBUMIN SERPL ELPH-MCNC: 4.6 G/DL
ALP BLD-CCNC: 54 U/L
ALT SERPL-CCNC: 11 U/L
ANION GAP SERPL CALC-SCNC: 15 MMOL/L
AST SERPL-CCNC: 16 U/L
BASOPHILS # BLD AUTO: 0.05 K/UL
BASOPHILS # BLD AUTO: 0.06 K/UL
BASOPHILS NFR BLD AUTO: 0.5 %
BASOPHILS NFR BLD AUTO: 0.6 %
BILIRUB SERPL-MCNC: 0.2 MG/DL
BUN SERPL-MCNC: 28 MG/DL
CALCIUM SERPL-MCNC: 9.5 MG/DL
CHLORIDE SERPL-SCNC: 102 MMOL/L
CHOLEST SERPL-MCNC: 154 MG/DL
CO2 SERPL-SCNC: 22 MMOL/L
CREAT SERPL-MCNC: 1.1 MG/DL
EGFRCR SERPLBLD CKD-EPI 2021: 50 ML/MIN/1.73M2
EOSINOPHIL # BLD AUTO: 0.3 K/UL
EOSINOPHIL # BLD AUTO: 0.31 K/UL
EOSINOPHIL NFR BLD AUTO: 2.9 %
EOSINOPHIL NFR BLD AUTO: 3 %
ESTIMATED AVERAGE GLUCOSE: 194 MG/DL
GLUCOSE SERPL-MCNC: 155 MG/DL
HBA1C MFR BLD HPLC: 8.4 %
HCT VFR BLD CALC: 37.5 %
HCT VFR BLD CALC: 38 %
HDLC SERPL-MCNC: 89 MG/DL
HGB BLD-MCNC: 11.8 G/DL
HGB BLD-MCNC: 11.8 G/DL
IMM GRANULOCYTES NFR BLD AUTO: 0.5 %
IMM GRANULOCYTES NFR BLD AUTO: 0.5 %
LDLC SERPL-MCNC: 45 MG/DL
LYMPHOCYTES # BLD AUTO: 2.58 K/UL
LYMPHOCYTES # BLD AUTO: 2.63 K/UL
LYMPHOCYTES NFR BLD AUTO: 24.8 %
LYMPHOCYTES NFR BLD AUTO: 25.7 %
MAN DIFF?: NORMAL
MAN DIFF?: NORMAL
MCHC RBC-ENTMCNC: 27.4 PG
MCHC RBC-ENTMCNC: 27.8 PG
MCHC RBC-ENTMCNC: 31.1 G/DL
MCHC RBC-ENTMCNC: 31.5 G/DL
MCV RBC AUTO: 88.4 FL
MCV RBC AUTO: 88.4 FL
MONOCYTES # BLD AUTO: 0.64 K/UL
MONOCYTES # BLD AUTO: 0.69 K/UL
MONOCYTES NFR BLD AUTO: 6.3 %
MONOCYTES NFR BLD AUTO: 6.6 %
NEUTROPHILS # BLD AUTO: 6.54 K/UL
NEUTROPHILS # BLD AUTO: 6.71 K/UL
NEUTROPHILS NFR BLD AUTO: 64 %
NEUTROPHILS NFR BLD AUTO: 64.6 %
NONHDLC SERPL-MCNC: 65 MG/DL
PLATELET # BLD AUTO: 387 K/UL
PLATELET # BLD AUTO: 391 K/UL
PMV BLD AUTO: 0 /100 WBCS
PMV BLD AUTO: 0 /100 WBCS
PMV BLD: 10.4 FL
PMV BLD: 10.4 FL
POTASSIUM SERPL-SCNC: 4.7 MMOL/L
PROT SERPL-MCNC: 7.5 G/DL
RBC # BLD: 4.24 M/UL
RBC # BLD: 4.3 M/UL
RBC # FLD: 13.9 %
RBC # FLD: 13.9 %
SODIUM SERPL-SCNC: 139 MMOL/L
TRIGL SERPL-MCNC: 115 MG/DL
TSH SERPL-ACNC: 2.77 UIU/ML
TSH SERPL-ACNC: 2.99 UIU/ML
WBC # FLD AUTO: 10.22 K/UL
WBC # FLD AUTO: 10.39 K/UL

## 2025-06-30 PROCEDURE — 82306 VITAMIN D 25 HYDROXY: CPT

## 2025-06-30 PROCEDURE — 80053 COMPREHEN METABOLIC PANEL: CPT

## 2025-06-30 PROCEDURE — 80061 LIPID PANEL: CPT

## 2025-06-30 PROCEDURE — 85025 COMPLETE CBC W/AUTO DIFF WBC: CPT

## 2025-06-30 PROCEDURE — 84443 ASSAY THYROID STIM HORMONE: CPT

## 2025-06-30 PROCEDURE — 83036 HEMOGLOBIN GLYCOSYLATED A1C: CPT

## 2025-07-01 DIAGNOSIS — E78.5 HYPERLIPIDEMIA, UNSPECIFIED: ICD-10-CM

## 2025-07-01 DIAGNOSIS — E11.9 TYPE 2 DIABETES MELLITUS WITHOUT COMPLICATIONS: ICD-10-CM

## 2025-07-01 DIAGNOSIS — Z00.00 ENCOUNTER FOR GENERAL ADULT MEDICAL EXAMINATION WITHOUT ABNORMAL FINDINGS: ICD-10-CM

## 2025-07-01 DIAGNOSIS — J45.909 UNSPECIFIED ASTHMA, UNCOMPLICATED: ICD-10-CM

## 2025-07-01 DIAGNOSIS — E11.65 TYPE 2 DIABETES MELLITUS WITH HYPERGLYCEMIA: ICD-10-CM

## 2025-07-01 DIAGNOSIS — I10 ESSENTIAL (PRIMARY) HYPERTENSION: ICD-10-CM

## 2025-07-01 LAB — 25(OH)D3 SERPL-MCNC: 26 NG/ML

## 2025-07-08 ENCOUNTER — RESULT REVIEW (OUTPATIENT)
Age: 85
End: 2025-07-08

## 2025-07-08 ENCOUNTER — OUTPATIENT (OUTPATIENT)
Dept: OUTPATIENT SERVICES | Facility: HOSPITAL | Age: 85
LOS: 1 days | End: 2025-07-08
Payer: MEDICARE

## 2025-07-08 ENCOUNTER — APPOINTMENT (OUTPATIENT)
Dept: CV DIAGNOSITCS | Facility: HOSPITAL | Age: 85
End: 2025-07-08
Payer: MEDICARE

## 2025-07-08 ENCOUNTER — NON-APPOINTMENT (OUTPATIENT)
Age: 85
End: 2025-07-08

## 2025-07-08 DIAGNOSIS — Z96.0 PRESENCE OF UROGENITAL IMPLANTS: Chronic | ICD-10-CM

## 2025-07-08 DIAGNOSIS — Z98.891 HISTORY OF UTERINE SCAR FROM PREVIOUS SURGERY: Chronic | ICD-10-CM

## 2025-07-08 DIAGNOSIS — Z96.651 PRESENCE OF RIGHT ARTIFICIAL KNEE JOINT: Chronic | ICD-10-CM

## 2025-07-08 DIAGNOSIS — Z90.49 ACQUIRED ABSENCE OF OTHER SPECIFIED PARTS OF DIGESTIVE TRACT: Chronic | ICD-10-CM

## 2025-07-08 DIAGNOSIS — I10 ESSENTIAL (PRIMARY) HYPERTENSION: ICD-10-CM

## 2025-07-08 PROCEDURE — 93306 TTE W/DOPPLER COMPLETE: CPT | Mod: 26

## 2025-07-08 PROCEDURE — 93306 TTE W/DOPPLER COMPLETE: CPT

## 2025-07-09 DIAGNOSIS — I10 ESSENTIAL (PRIMARY) HYPERTENSION: ICD-10-CM

## 2025-07-19 ENCOUNTER — APPOINTMENT (OUTPATIENT)
Dept: CARE COORDINATION | Facility: HOME HEALTH | Age: 85
End: 2025-07-19

## 2025-08-16 ENCOUNTER — APPOINTMENT (OUTPATIENT)
Dept: CARE COORDINATION | Facility: HOME HEALTH | Age: 85
End: 2025-08-16